# Patient Record
Sex: FEMALE | Race: ASIAN | NOT HISPANIC OR LATINO | Employment: PART TIME | URBAN - METROPOLITAN AREA
[De-identification: names, ages, dates, MRNs, and addresses within clinical notes are randomized per-mention and may not be internally consistent; named-entity substitution may affect disease eponyms.]

---
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
## 2021-01-23 ENCOUNTER — IMMUNIZATIONS (OUTPATIENT)
Dept: FAMILY MEDICINE CLINIC | Facility: HOSPITAL | Age: 21
End: 2021-01-23

## 2021-01-23 DIAGNOSIS — Z23 ENCOUNTER FOR IMMUNIZATION: Primary | ICD-10-CM

## 2021-01-23 PROCEDURE — 91301 SARS-COV-2 / COVID-19 MRNA VACCINE (MODERNA) 100 MCG: CPT

## 2021-01-23 PROCEDURE — 0012A SARS-COV-2 / COVID-19 MRNA VACCINE (MODERNA) 100 MCG: CPT

## 2021-02-11 ENCOUNTER — TELEPHONE (OUTPATIENT)
Dept: INTERNAL MEDICINE CLINIC | Facility: CLINIC | Age: 21
End: 2021-02-11

## 2021-02-11 ENCOUNTER — TELEMEDICINE (OUTPATIENT)
Dept: INTERNAL MEDICINE CLINIC | Facility: CLINIC | Age: 21
End: 2021-02-11
Payer: COMMERCIAL

## 2021-02-11 VITALS — WEIGHT: 130 LBS | HEIGHT: 61 IN | TEMPERATURE: 97 F | BODY MASS INDEX: 24.55 KG/M2

## 2021-02-11 DIAGNOSIS — F32.A ANXIETY AND DEPRESSION: Primary | ICD-10-CM

## 2021-02-11 DIAGNOSIS — N94.3 PMS (PREMENSTRUAL SYNDROME): ICD-10-CM

## 2021-02-11 DIAGNOSIS — F41.9 ANXIETY AND DEPRESSION: Primary | ICD-10-CM

## 2021-02-11 PROCEDURE — 99213 OFFICE O/P EST LOW 20 MIN: CPT | Performed by: NURSE PRACTITIONER

## 2021-02-11 RX ORDER — VENLAFAXINE HYDROCHLORIDE 37.5 MG/1
37.5 CAPSULE, EXTENDED RELEASE ORAL
Qty: 30 CAPSULE | Refills: 5 | Status: SHIPPED | OUTPATIENT
Start: 2021-02-11 | End: 2021-03-03 | Stop reason: SDUPTHER

## 2021-02-11 NOTE — TELEPHONE ENCOUNTER
Pt was not available until after 4pm today so I scheduled her for a virtual visit with Niki coronado

## 2021-02-11 NOTE — TELEPHONE ENCOUNTER
Pt would like to speak to you about getting b/w done before she gets her prescription and her mother has a few question  Please, call pt

## 2021-02-11 NOTE — TELEPHONE ENCOUNTER
Pt's mother called to ask if you could order any hormonal tests because the pt is having a lot of emotion issues during her menses  Mother reports that she perez at the drop of a hat and has anger issues  This had been going on for months  Nothing seems to help  Pt sees Dr Patricia Rodríguez for gyn  They would not do anything and cannot see her until the end of March  Please, advise

## 2021-02-12 ENCOUNTER — LAB (OUTPATIENT)
Dept: LAB | Age: 21
End: 2021-02-12
Payer: COMMERCIAL

## 2021-02-12 DIAGNOSIS — F41.9 ANXIETY AND DEPRESSION: ICD-10-CM

## 2021-02-12 DIAGNOSIS — F32.A ANXIETY AND DEPRESSION: ICD-10-CM

## 2021-02-12 DIAGNOSIS — N94.3 PMS (PREMENSTRUAL SYNDROME): ICD-10-CM

## 2021-02-12 LAB
ALBUMIN SERPL BCP-MCNC: 3.8 G/DL (ref 3.5–5)
ALP SERPL-CCNC: 43 U/L (ref 46–384)
ALT SERPL W P-5'-P-CCNC: 26 U/L (ref 12–78)
ANION GAP SERPL CALCULATED.3IONS-SCNC: 3 MMOL/L (ref 4–13)
AST SERPL W P-5'-P-CCNC: 21 U/L (ref 5–45)
BASOPHILS # BLD AUTO: 0.02 THOUSANDS/ΜL (ref 0–0.1)
BASOPHILS NFR BLD AUTO: 0 % (ref 0–1)
BILIRUB SERPL-MCNC: 0.31 MG/DL (ref 0.2–1)
BUN SERPL-MCNC: 13 MG/DL (ref 5–25)
CALCIUM SERPL-MCNC: 10 MG/DL (ref 8.3–10.1)
CHLORIDE SERPL-SCNC: 106 MMOL/L (ref 100–108)
CO2 SERPL-SCNC: 29 MMOL/L (ref 21–32)
CREAT SERPL-MCNC: 0.81 MG/DL (ref 0.6–1.3)
EOSINOPHIL # BLD AUTO: 0.03 THOUSAND/ΜL (ref 0–0.61)
EOSINOPHIL NFR BLD AUTO: 1 % (ref 0–6)
ERYTHROCYTE [DISTWIDTH] IN BLOOD BY AUTOMATED COUNT: 12.6 % (ref 11.6–15.1)
GFR SERPL CREATININE-BSD FRML MDRD: 106 ML/MIN/1.73SQ M
GLUCOSE SERPL-MCNC: 128 MG/DL (ref 65–140)
HCT VFR BLD AUTO: 41.1 % (ref 34.8–46.1)
HGB BLD-MCNC: 13.3 G/DL (ref 11.5–15.4)
IMM GRANULOCYTES # BLD AUTO: 0.02 THOUSAND/UL (ref 0–0.2)
IMM GRANULOCYTES NFR BLD AUTO: 0 % (ref 0–2)
LYMPHOCYTES # BLD AUTO: 1.81 THOUSANDS/ΜL (ref 0.6–4.47)
LYMPHOCYTES NFR BLD AUTO: 29 % (ref 14–44)
MCH RBC QN AUTO: 30.7 PG (ref 26.8–34.3)
MCHC RBC AUTO-ENTMCNC: 32.4 G/DL (ref 31.4–37.4)
MCV RBC AUTO: 95 FL (ref 82–98)
MONOCYTES # BLD AUTO: 0.33 THOUSAND/ΜL (ref 0.17–1.22)
MONOCYTES NFR BLD AUTO: 5 % (ref 4–12)
NEUTROPHILS # BLD AUTO: 4 THOUSANDS/ΜL (ref 1.85–7.62)
NEUTS SEG NFR BLD AUTO: 65 % (ref 43–75)
NRBC BLD AUTO-RTO: 0 /100 WBCS
PLATELET # BLD AUTO: 248 THOUSANDS/UL (ref 149–390)
PMV BLD AUTO: 10 FL (ref 8.9–12.7)
POTASSIUM SERPL-SCNC: 4.9 MMOL/L (ref 3.5–5.3)
PROGEST SERPL-MCNC: 0.6 NG/ML
PROT SERPL-MCNC: 7.7 G/DL (ref 6.4–8.2)
RBC # BLD AUTO: 4.33 MILLION/UL (ref 3.81–5.12)
SODIUM SERPL-SCNC: 138 MMOL/L (ref 136–145)
TSH SERPL DL<=0.05 MIU/L-ACNC: 2.52 UIU/ML (ref 0.46–3.98)
WBC # BLD AUTO: 6.21 THOUSAND/UL (ref 4.31–10.16)

## 2021-02-12 PROCEDURE — 84144 ASSAY OF PROGESTERONE: CPT

## 2021-02-12 PROCEDURE — 36415 COLL VENOUS BLD VENIPUNCTURE: CPT

## 2021-02-12 PROCEDURE — 80053 COMPREHEN METABOLIC PANEL: CPT

## 2021-02-12 PROCEDURE — 85025 COMPLETE CBC W/AUTO DIFF WBC: CPT

## 2021-02-12 PROCEDURE — 82672 ASSAY OF ESTROGEN: CPT

## 2021-02-12 PROCEDURE — 84443 ASSAY THYROID STIM HORMONE: CPT

## 2021-02-17 NOTE — PROGRESS NOTES
Virtual Brief Visit    Assessment/Plan:    Problem List Items Addressed This Visit        Other    Anxiety and depression - Primary     Spoke to patient and mother, recommend starting low dose effexor  bloodwork ordered for a baseline check up         Relevant Medications    venlafaxine (EFFEXOR-XR) 37 5 mg 24 hr capsule                Reason for visit is   Chief Complaint   Patient presents with    Virtual Brief Visit        Encounter provider KETAN Bloom    Provider located at [address] [place] [id number]    Recent Visits  No visits were found meeting these conditions  Showing recent visits within past 7 days and meeting all other requirements     Future Appointments  No visits were found meeting these conditions  Showing future appointments within next 150 days and meeting all other requirements        After connecting through [employer] and patient was informed that this is a secure, HIPAA-compliant platform  She agrees to proceed  , the patient was identified by name and date of birth  Ria Wolff was informed that this is a telemedicine visit and that the visit is being conducted through [facility] and patient was informed that this is a secure, HIPAA-compliant platform  She agrees to proceed     My office door was closed  No one else was in the room  She acknowledged consent and understanding of privacy and security of the platform  The patient has agreed to participate and understands she can discontinue the visit at any time  Patient is aware this is a billable service  Subjective    Ria Wolff is a 24 y o  female     Patient is here for depression, agitation 2 weeks before and a week after her period  Sees her gyn in march  No suicidal thoughts       Past Medical History:   Diagnosis Date    Asthma exacerbation, mild     last assessed - 06Oct2016    Chronic tonsillitis     Eczema     Fracture of phalanx of right index finger     Menorrhagia     last assessed - [id number]    Obstructive sleep apnea of child     Orthostatic dizziness     last assessed - [id number]    Premenarchal     Seasonal allergies     Resolved - [id number]       Past Surgical History:   Procedure Laterality Date    TONSILLECTOMY AND ADENOIDECTOMY         Current Outpatient Medications   Medication Sig Dispense Refill    norgestimate-ethinyl estradiol (ORTHO TRI-CYCLEN LO) 0 18/0 215/0 25 MG-25 MCG per tablet Take 1 tablet by mouth daily 84 tablet 3    Pediatric Multivit-Minerals-C (MULTIVITAMIN GUMMIES CHILDRENS) CHEW Chew 1 tablet daily      ketoconazole (NIZORAL) 2 % shampoo Apply 1 application topically once for 1 dose Apply daily x 3 days  Lather then rinse after 5 min 120 mL 2    levalbuterol (XOPENEX HFA) 45 mcg/act inhaler Inhale 1-2 puffs every 4 (four) hours as needed for wheezing (Patient not taking: Reported on 11/18/2020) 1 Inhaler 0    venlafaxine (EFFEXOR-XR) 37 5 mg 24 hr capsule Take 1 capsule (37 5 mg total) by mouth daily with breakfast 30 capsule 5     Current Facility-Administered Medications   Medication Dose Route Frequency Provider Last Rate Last Admin    levalbuterol (XOPENEX) inhalation solution 0 63 mg  0 63 mg Nebulization Q8H PRN Mehrdad Clement PA-C   0 63 mg at 02/08/19 1805        Allergies   Allergen Reactions    Other      Other reaction(s): Asthma    Pollen Extract Allergic Rhinitis and Wheezing       Review of Systems   Constitutional: Negative  HENT: Negative  Eyes: Negative  Respiratory: Negative  Cardiovascular: Negative  Gastrointestinal: Negative  Musculoskeletal: Negative  Neurological: Negative  Psychiatric/Behavioral: The patient is nervous/anxious          Vitals:    02/11/21 1534   Temp: (!) 97 °F (36 1 °C)   Weight: 59 kg (130 lb)   Height: 5' 1" (1 549 m)         I spent 15 minutes directly with the patient during this visit    VIRTUAL VISIT Shy Cunningham acknowledges that she has consented to an online visit or consultation  She understands that the online visit is based solely on information provided by her, and that, in the absence of a face-to-face physical evaluation by the physician, the diagnosis she receives is both limited and provisional in terms of accuracy and completeness  This is not intended to replace a full medical face-to-face evaluation by the physician  Ria Wolff understands and accepts these terms

## 2021-02-18 LAB — ESTROGEN SERPL-MCNC: 16 PG/ML

## 2021-02-22 PROBLEM — F41.9 ANXIETY AND DEPRESSION: Status: ACTIVE | Noted: 2021-02-22

## 2021-02-22 PROBLEM — F32.A ANXIETY AND DEPRESSION: Status: ACTIVE | Noted: 2021-02-22

## 2021-02-22 NOTE — ASSESSMENT & PLAN NOTE
Spoke to patient and mother, recommend starting low dose effexor  bloodwork ordered for a baseline check up

## 2021-03-03 ENCOUNTER — TELEMEDICINE (OUTPATIENT)
Dept: INTERNAL MEDICINE CLINIC | Facility: CLINIC | Age: 21
End: 2021-03-03
Payer: COMMERCIAL

## 2021-03-03 VITALS — TEMPERATURE: 97.7 F | HEIGHT: 61 IN | BODY MASS INDEX: 24.55 KG/M2 | WEIGHT: 130 LBS

## 2021-03-03 DIAGNOSIS — F41.9 ANXIETY AND DEPRESSION: ICD-10-CM

## 2021-03-03 DIAGNOSIS — F32.A ANXIETY AND DEPRESSION: ICD-10-CM

## 2021-03-03 PROCEDURE — 99442 PR PHYS/QHP TELEPHONE EVALUATION 11-20 MIN: CPT | Performed by: NURSE PRACTITIONER

## 2021-03-03 RX ORDER — BIOTIN 1 MG
3000 TABLET ORAL DAILY
COMMUNITY
End: 2022-03-30

## 2021-03-03 RX ORDER — VENLAFAXINE HYDROCHLORIDE 75 MG/1
75 CAPSULE, EXTENDED RELEASE ORAL
Qty: 30 CAPSULE | Refills: 2 | Status: SHIPPED | OUTPATIENT
Start: 2021-03-03 | End: 2021-06-21 | Stop reason: SDUPTHER

## 2021-03-05 NOTE — PROGRESS NOTES
Virtual Brief Visit    Assessment/Plan:    Problem List Items Addressed This Visit        Other    Anxiety and depression     Increase effexor to 75mg  Follow up in 2 weeks         Relevant Medications    venlafaxine (EFFEXOR-XR) 75 mg 24 hr capsule                Reason for visit is   Chief Complaint   Patient presents with    Virtual Brief Visit        Encounter provider KETAN Corral    Provider located at [address] [address] [phone]    Recent Visits  Date Type Provider Dept   03/03/[facility] recent visits within past 7 days and meeting all other requirements     Future Appointments  No visits were found meeting these conditions  Showing future appointments within next 150 days and meeting all other requirements        After connecting through telephone, the patient was identified by name and date of birth  Sherren Ro was informed that this is a telemedicine visit and that the visit is being conducted through telephone  My office door was closed  No one else was in the room  She acknowledged consent and understanding of privacy and security of the platform  The patient has agreed to participate and understands she can discontinue the visit at any time  Patient is aware this is a billable service  Subjective    Sherren Ro is a 24 y o  female     Patient is here to follow up on depression and anxiety   On effexor 37 5mg and tolerating the medication  However she is seeing no improvement in her moods       Past Medical History:   Diagnosis Date    Asthma exacerbation, mild     last assessed - 06Oct2016    Chronic tonsillitis     Eczema     Fracture of phalanx of right index finger     Menorrhagia     last assessed - [id number]    Obstructive sleep apnea of child     Orthostatic dizziness     last assessed - [id number]    Premenarchal     Seasonal allergies [facility] - [id number]       Past Surgical History:   Procedure Laterality Date    TONSILLECTOMY AND ADENOIDECTOMY         Current Outpatient Medications   Medication Sig Dispense Refill    Biotin 1000 MCG tablet Take 3,000 mcg by mouth daily      norgestimate-ethinyl estradiol (ORTHO TRI-CYCLEN LO) 0 18/0 215/0 25 MG-25 MCG per tablet Take 1 tablet by mouth daily 84 tablet 3    Pediatric Multivit-Minerals-C (MULTIVITAMIN GUMMIES CHILDRENS) CHEW Chew 1 tablet daily      venlafaxine (EFFEXOR-XR) 75 mg 24 hr capsule Take 1 capsule (75 mg total) by mouth daily with breakfast 30 capsule 2    ketoconazole (NIZORAL) 2 % shampoo Apply 1 application topically once for 1 dose Apply daily x 3 days  Lather then rinse after 5 min (Patient not taking: Reported on 3/3/2021) 120 mL 2    levalbuterol (XOPENEX HFA) 45 mcg/act inhaler Inhale 1-2 puffs every 4 (four) hours as needed for wheezing (Patient not taking: Reported on 3/3/2021) 1 Inhaler 0     Current Facility-Administered Medications   Medication Dose Route Frequency Provider Last Rate Last Admin    levalbuterol (XOPENEX) inhalation solution 0 63 mg  0 63 mg Nebulization Q8H PRN Mehrdad Clement PA-C   0 63 mg at 02/08/19 1805        Allergies   Allergen Reactions    Other      Other reaction(s): Asthma    Pollen Extract Allergic Rhinitis and Wheezing       Review of Systems   Constitutional: Negative  HENT: Negative  Eyes: Negative  Respiratory: Negative  Cardiovascular: Negative  Gastrointestinal: Negative  Musculoskeletal: Negative  Neurological: Negative  Psychiatric/Behavioral: Positive for agitation  The patient is nervous/anxious  Vitals:    03/03/21 1343   Temp: 97 7 °F (36 5 °C)   Weight: 59 kg (130 lb)   Height: 5' 1" (1 549 m)         I spent 15 minutes directly with the patient during this visit    VIRTUAL VISIT DISCLAIMER    Per [place] acknowledges that she has consented to an online visit or consultation   She understands that the online visit is based solely on information provided by her, and that, in the absence of a face-to-face physical evaluation by the physician, the diagnosis she receives is both limited and provisional in terms of accuracy and completeness  This is not intended to replace a full medical face-to-face evaluation by the physician  Kaylee Claudio understands and accepts these terms

## 2021-03-17 ENCOUNTER — TELEMEDICINE (OUTPATIENT)
Dept: INTERNAL MEDICINE CLINIC | Facility: CLINIC | Age: 21
End: 2021-03-17
Payer: COMMERCIAL

## 2021-03-17 DIAGNOSIS — F41.9 ANXIETY AND DEPRESSION: Primary | ICD-10-CM

## 2021-03-17 DIAGNOSIS — F32.A ANXIETY AND DEPRESSION: Primary | ICD-10-CM

## 2021-03-17 PROCEDURE — 99213 OFFICE O/P EST LOW 20 MIN: CPT | Performed by: NURSE PRACTITIONER

## 2021-03-17 NOTE — PROGRESS NOTES
Virtual Brief Visit    Assessment/Plan:    Problem List Items Addressed This Visit        Other    Anxiety and depression - Primary     Increased effexor to 75mh  Follow up in 3 weeks  See gyn in a week                     Reason for visit is   Chief Complaint   Patient presents with    Virtual Brief Visit        Encounter provider KETAN Vickers    Provider located at [address] [place] [id number]    Recent Visits  Date Type Provider Dept   03/17/21 Telemedicine Carol Vickers recent visits within past 7 days and meeting all other requirements     Future Appointments  No visits were found meeting these conditions  Showing future appointments within next 150 days and meeting all other requirements        After connecting through [employer] and patient was informed that this is a secure, HIPAA-compliant platform  She agrees to proceed  , the patient was identified by name and date of birth  Helen Ayala was informed that this is a telemedicine visit and that the visit is being conducted through [facility] and patient was informed that this is a secure, HIPAA-compliant platform  She agrees to proceed     My office door was closed  No one else was in the room  She acknowledged consent and understanding of privacy and security of the platform  The patient has agreed to participate and understands she can discontinue the visit at any time  Patient is aware this is a billable service  Subjective    Helen Ayala is a [address] [facility] y o  female     Patient is here to follow up of depression/anxiety  She feels an improvement on effexor  Not as depressed/irritable around her period       Past Medical History:   Diagnosis Date    Asthma exacerbation, mild     last assessed - 06Oct2016    Chronic tonsillitis     Eczema     Fracture of phalanx of right index finger     Menorrhagia     last assessed - [id number]    Obstructive sleep apnea of child     Orthostatic dizziness     last assessed - 20Nov2015    Premenarchal     Seasonal allergies     Resolved - [id number]       Past Surgical History:   Procedure Laterality Date    TONSILLECTOMY AND ADENOIDECTOMY         Current Outpatient Medications   Medication Sig Dispense Refill    Biotin 1000 MCG tablet Take 3,000 mcg by mouth daily      norgestimate-ethinyl estradiol (ORTHO TRI-CYCLEN LO) 0 18/0 215/0 25 MG-25 MCG per tablet Take 1 tablet by mouth daily 84 tablet 3    Pediatric Multivit-Minerals-C (MULTIVITAMIN GUMMIES CHILDRENS) CHEW Chew 1 tablet daily      venlafaxine (EFFEXOR-XR) 75 mg 24 hr capsule Take 1 capsule (75 mg total) by mouth daily with breakfast 30 capsule 2    ketoconazole (NIZORAL) 2 % shampoo Apply 1 application topically once for 1 dose Apply daily x 3 days  Lather then rinse after 5 min (Patient not taking: Reported on 3/3/2021) 120 mL 2     Current Facility-Administered Medications   Medication Dose Route Frequency Provider Last Rate Last Admin    levalbuterol (XOPENEX) inhalation solution 0 63 mg  0 63 mg Nebulization Q8H PRN Mehrdad Clement PA-C   0 63 mg at 02/08/19 1805        Allergies   Allergen Reactions    Other      Other reaction(s): Asthma    Pollen Extract Allergic Rhinitis and Wheezing       Review of Systems   Constitutional: Negative  HENT: Negative  Eyes: Negative  Respiratory: Negative  Cardiovascular: Negative  Gastrointestinal: Negative  Musculoskeletal: Negative  Neurological: Negative  There were no vitals filed for this visit  I spent 15 minutes directly with the patient during this visit    [address] acknowledges that she has consented to an online visit or consultation   She understands that the online visit is based solely on information provided by her, and that, in the absence of a face-to-face physical evaluation by the physician, the diagnosis she receives is both limited and provisional in terms of accuracy and completeness  This is not intended to replace a full medical face-to-face evaluation by the physician  Bianca Bolton understands and accepts these terms

## 2021-03-24 ENCOUNTER — OFFICE VISIT (OUTPATIENT)
Dept: OBGYN CLINIC | Facility: CLINIC | Age: 21
End: 2021-03-24
Payer: COMMERCIAL

## 2021-03-24 VITALS
HEIGHT: 61 IN | SYSTOLIC BLOOD PRESSURE: 100 MMHG | WEIGHT: 131 LBS | BODY MASS INDEX: 24.73 KG/M2 | DIASTOLIC BLOOD PRESSURE: 68 MMHG

## 2021-03-24 DIAGNOSIS — F32.A ANXIETY AND DEPRESSION: Primary | ICD-10-CM

## 2021-03-24 DIAGNOSIS — R53.83 FATIGUE, UNSPECIFIED TYPE: ICD-10-CM

## 2021-03-24 DIAGNOSIS — F41.9 ANXIETY AND DEPRESSION: Primary | ICD-10-CM

## 2021-03-24 PROCEDURE — 99214 OFFICE O/P EST MOD 30 MIN: CPT | Performed by: OBSTETRICS & GYNECOLOGY

## 2021-03-24 NOTE — PROGRESS NOTES
Assessment/Plan:    We discussed options including changing OCPs verses monitoring symptoms over the next month, given there has been improvement since starting antidepressant  Risks and benefits reviewed  She will continue to follow-up with her therapist   She is also seeking help, tutorial regarding her class load  She will call with update in 3 weeks  Return to office 11/2021 for annual or p r n  No problem-specific Assessment & Plan notes found for this encounter  There are no diagnoses linked to this encounter  Subjective:      Patient ID: Darwin Chen is a 24 y o  female  HPI       This is a pleasant 20-year-old female G0 who presents for discussion of anxiety, depression, mood swings irritability over the last 3 months  Patient has been on Ortho-Tri-Cyclen Lo for approximately 13 months  Her menstrual cycles are regular every 4 weeks lasting 5 days with no breakthrough bleeding  She feels her menstrual cycles have improved when she started the birth control pills  She has not been sexually active in over 1 year  She is seeing a therapist   She is also discussed with her primary care provider anxiety  She was placed on Effexor approximately 4 weeks ago, slightly titrated up  She has noticed some improvement  She also has found school more difficult, second year [employer]  She is not doing well in 2 of her classes  It is a hybrid model  She feels her mood swings irritability are on a daily basis throughout the month, not necessarily worse with menses  The following portions of the patient's history were reviewed and updated as appropriate: allergies, current medications, past family history, past medical history, past social history, past surgical history and problem list     Review of Systems   Constitutional: Positive for fatigue  Negative for fever and unexpected weight change  Respiratory: Negative for cough, chest tightness, shortness of breath and wheezing  Cardiovascular: Negative  Negative for chest pain and palpitations  Gastrointestinal: Negative  Negative for abdominal distention, abdominal pain, blood in stool, constipation, diarrhea, nausea and vomiting  Genitourinary: Negative  Negative for difficulty urinating, dyspareunia, dysuria, flank pain, frequency, genital sores, hematuria, pelvic pain, urgency, vaginal bleeding, vaginal discharge and vaginal pain  Skin: Negative for rash  Objective:      /68   Ht 5' 1" (1 549 m)   Wt 59 4 kg (131 lb)   LMP 03/15/2021   BMI 24 75 kg/m²          Physical Exam  Constitutional:       Appearance: Normal appearance  Cardiovascular:      Rate and Rhythm: Normal rate and regular rhythm  Pulmonary:      Effort: Pulmonary effort is normal    Neurological:      Mental Status: She is alert and oriented to person, place, and time     Psychiatric:         Behavior: Behavior normal

## 2021-06-21 DIAGNOSIS — F41.9 ANXIETY AND DEPRESSION: ICD-10-CM

## 2021-06-21 DIAGNOSIS — F32.A ANXIETY AND DEPRESSION: ICD-10-CM

## 2021-06-21 RX ORDER — VENLAFAXINE HYDROCHLORIDE 75 MG/1
75 CAPSULE, EXTENDED RELEASE ORAL
Qty: 30 CAPSULE | Refills: 2 | Status: SHIPPED | OUTPATIENT
Start: 2021-06-21 | End: 2021-10-02

## 2021-06-25 ENCOUNTER — OFFICE VISIT (OUTPATIENT)
Dept: INTERNAL MEDICINE CLINIC | Facility: CLINIC | Age: 21
End: 2021-06-25
Payer: COMMERCIAL

## 2021-06-25 ENCOUNTER — TELEPHONE (OUTPATIENT)
Dept: INTERNAL MEDICINE CLINIC | Facility: CLINIC | Age: 21
End: 2021-06-25

## 2021-06-25 VITALS
BODY MASS INDEX: 24.17 KG/M2 | TEMPERATURE: 96.6 F | HEIGHT: 61 IN | HEART RATE: 81 BPM | OXYGEN SATURATION: 99 % | WEIGHT: 128 LBS | DIASTOLIC BLOOD PRESSURE: 80 MMHG | SYSTOLIC BLOOD PRESSURE: 100 MMHG

## 2021-06-25 DIAGNOSIS — K62.5 RECTAL BLEEDING: Primary | ICD-10-CM

## 2021-06-25 PROBLEM — R10.32 LEFT LOWER QUADRANT ABDOMINAL PAIN: Status: ACTIVE | Noted: 2021-06-25

## 2021-06-25 PROCEDURE — 99213 OFFICE O/P EST LOW 20 MIN: CPT | Performed by: NURSE PRACTITIONER

## 2021-06-25 NOTE — TELEPHONE ENCOUNTER
It would be up to the specialist if they want to do a colonoscopy  She needs to see the doctor first and be evaluated and they will decide on a plan

## 2021-06-25 NOTE — TELEPHONE ENCOUNTER
Patient is asking if you can call her mom and give her an update on what is going on  Patient's mom would like to speak with you  Patient will be going to Colorectal today at 12:45 for an appointment  Patient is also requesting a note for work for today        Please advise

## 2021-06-25 NOTE — PATIENT INSTRUCTIONS
Rectal Bleeding   WHAT YOU NEED TO KNOW:   What can cause rectal bleeding? · Constipation    · Hemorrhoids (swollen blood vessels in your rectum)    · Anal fissures (tears in the tissue inside your anus)    · Medical conditions, such as cancer, colitis, or diverticulitis     · Growths, such as tumors or polyps    · Medical treatments, such as radiation or rectal surgery    What increases my risk for rectal bleeding? · Older age    · Certain medicines, such as blood thinners and NSAIDs    · Medical conditions, such as inflammatory bowel disease, liver disease, or HIV    What other signs and symptoms may happen with rectal bleeding? You may have pain in your rectum or anus  You may also have abdominal pain or cramping  How is the cause of rectal bleeding diagnosed? · Rectal exam:  Your healthcare provider may gently insert a gloved finger into your anus  He will collect a bowel movement sample and send it to a lab for tests  · Blood tests: You may need blood taken to check for anemia (low amount of red blood cells)  · CT scan: This test is also called a CAT scan  An x-ray machine uses a computer to take pictures of the organs and blood vessels in your abdomen  The pictures may show problems that could cause bleeding  You may be given a dye before the pictures are taken to help healthcare providers see the pictures better  Tell the healthcare provider if you have ever had an allergic reaction to contrast dye  · Colonoscopy: This is a procedure to look inside your lower bowel  It may show where the bleeding is coming from and what is causing it  A tube with a light on the end will be put into your anus and then moved into your colon  If your healthcare provider finds a growth, he may remove it  · Endoscopy: This is a procedure to look inside your upper bowel  It may show where the bleeding is coming from and what is causing it   A tube with a light on the end is inserted into your throat and moved down into your stomach and upper bowel  If your healthcare provider finds a growth, he may remove it  He may put a shot of medicine in bleeding areas to narrow the blood vessels and stop the bleeding  Heat, laser, or electric currents may also be used to make the blood clot  How is rectal bleeding treated? · Medicine:      ? Pain medicine: You may be given a prescription medicine to decrease pain  Do not wait until the pain is severe before you take this medicine  ? Vasoconstrictors: This medicine decreases the size of your blood vessels and may help stop the bleeding  ? Iron supplement:  Iron helps your body make more red blood cells  ? Steroids: This medicine decreases inflammation in your rectum  It may be applied as a cream, ointment, or lotion  · IV:  You may need an IV if you are dehydrated and need extra liquids  · Blood transfusion:  You will get whole or parts of blood through an IV during a transfusion  Blood is tested for diseases, such as hepatitis and HIV, to be sure it is safe  · Surgery: You may need surgery to remove hemorrhoids, tumors, or polyps  What are the risks of rectal bleeding? · You may have abdominal pain or damage to nearby organs and blood vessels with surgery  Even with treatment, rectal bleeding may continue  Or, it may go away for a time and start again  · Without treatment, you may continue to have pain and cramping  You may develop anemia  You may need a blood transfusion  You may lose a large amount of blood  This can be life-threatening  How can I manage my symptoms? Ask your healthcare provider how much liquid to drink each day and which liquids are best for you  This will help prevent dehydration and constipation  When should I contact my healthcare provider? · You have a fever  · Your rectal bleeding stopped for a time, but has started again  · You have nausea  · You have cold, sweaty, pale skin      · You have changes in your bowel movements, such as diarrhea  · You have questions or concerns about your condition or care  When should I seek immediate care or call 911? · You are breathing faster than usual     · You are dizzy, lightheaded, or feel faint  · You are confused or cannot think clearly  · You urinate less than usual or not at all  · Your rectal bleeding is constant or heavy  · You have severe abdominal pain or cramping  CARE AGREEMENT:   You have the right to help plan your care  Learn about your health condition and how it may be treated  Discuss treatment options with your healthcare providers to decide what care you want to receive  You always have the right to refuse treatment  The above information is an  only  It is not intended as medical advice for individual conditions or treatments  Talk to your doctor, nurse or pharmacist before following any medical regimen to see if it is safe and effective for you  © Copyright [address] Information is for End User's use only and may not be sold, redistributed or otherwise used for commercial purposes   All illustrations and images included in CareNotes® are the copyrighted property of [employer]  or [address] [place] [address]

## 2021-06-25 NOTE — ASSESSMENT & PLAN NOTE
Patient has appointment with dr Faby Phillips colorectal today  Eat bland diet cut out spicy foods, coffee, tomatoes

## 2021-06-25 NOTE — PROGRESS NOTES
Assessment/Plan:    Rectal bleeding  Patient has appointment with dr Natalia Parrish colorectal today  Eat bland diet cut out spicy foods, coffee, tomatoes        Diagnoses and all orders for this visit:    Rectal bleeding  -     Ambulatory referral to Colorectal Surgery; Future          Subjective:      Patient ID: Darren Maza is a 22 y o  female  Patient is here for rectal bleeding since Tuesday   Continues to bleed, she has to wear a pad  Blood is darker red  No rectal pain, hemorrhoids  She has chronic constipation        The following portions of the patient's history were reviewed and updated as appropriate: allergies, current medications, past family history, past medical history, past social history, past surgical history and problem list     Review of Systems   Constitutional: Negative  HENT: Negative  Eyes: Negative  Respiratory: Negative  Cardiovascular: Negative  Gastrointestinal: Positive for rectal pain  Musculoskeletal: Negative  Neurological: Negative  Objective:      /80   Pulse 81   Temp (!) 96 6 °F (35 9 °C) (Temporal)   Ht 5' 1" (1 549 m)   Wt 58 1 kg (128 lb)   SpO2 99%   BMI 24 19 kg/m²          Physical Exam  Vitals and nursing note reviewed  Constitutional:       Appearance: Normal appearance  She is well-developed  HENT:      Head: Normocephalic and atraumatic  Right Ear: External ear normal       Left Ear: External ear normal       Nose: Nose normal    Eyes:      Conjunctiva/sclera: Conjunctivae normal       Pupils: Pupils are equal, round, and reactive to light  Cardiovascular:      Rate and Rhythm: Normal rate and regular rhythm  Pulmonary:      Effort: Pulmonary effort is normal       Breath sounds: Normal breath sounds  Abdominal:      General: Bowel sounds are normal       Palpations: Abdomen is soft  Musculoskeletal:         General: Normal range of motion  Cervical back: Normal range of motion and neck supple     Skin: General: Skin is warm and dry  Neurological:      Mental Status: She is alert and oriented to person, place, and time

## 2021-06-25 NOTE — TELEPHONE ENCOUNTER
Mom called to confirm if her daughter will be scoped? The office you referred her to is saying is not not being scoped  Please advise

## 2021-06-25 NOTE — LETTER
June 25, 2021     Patient: Phoebe Constantino   YOB: 2000   Date of Visit: 6/25/2021       To Whom it May Concern:    [place] Sol[place] is under my professional care  She was seen in my office on 6/25/2021  Please excuse her from work today         Sincerely,          KETAN Bearden        CC: No Recipients

## 2021-06-30 ENCOUNTER — TELEPHONE (OUTPATIENT)
Dept: OBGYN CLINIC | Facility: CLINIC | Age: 21
End: 2021-06-30

## 2021-06-30 NOTE — TELEPHONE ENCOUNTER
Patient is calling cause is having break through bleeding for the first time on her pill (Ortho Tri Cyclen Lo)  Is having brown bleeding that started a week ago, period not due til next Tuesday  Is having to change full panty liner every 4 hours  Has not missed any pills  Has no change in diet, stress or life changes

## 2021-06-30 NOTE — TELEPHONE ENCOUNTER
Please explain to patient, given first episode of breakthrough bleeding, would recommend monitoring over the next month  If she has 2 consecutive months of breakthrough bleeding, then I would recommend changing birth control pill brands

## 2021-10-01 DIAGNOSIS — F41.9 ANXIETY AND DEPRESSION: ICD-10-CM

## 2021-10-01 DIAGNOSIS — F32.A ANXIETY AND DEPRESSION: ICD-10-CM

## 2021-10-02 RX ORDER — VENLAFAXINE HYDROCHLORIDE 75 MG/1
CAPSULE, EXTENDED RELEASE ORAL
Qty: 30 CAPSULE | Refills: 0 | Status: SHIPPED | OUTPATIENT
Start: 2021-10-02 | End: 2021-11-02 | Stop reason: SDUPTHER

## 2021-10-09 ENCOUNTER — OFFICE VISIT (OUTPATIENT)
Dept: URGENT CARE | Age: 21
End: 2021-10-09
Payer: COMMERCIAL

## 2021-10-09 ENCOUNTER — NURSE TRIAGE (OUTPATIENT)
Dept: OTHER | Facility: OTHER | Age: 21
End: 2021-10-09

## 2021-10-09 VITALS — TEMPERATURE: 97.7 F | OXYGEN SATURATION: 98 % | RESPIRATION RATE: 20 BRPM | HEART RATE: 118 BPM

## 2021-10-09 DIAGNOSIS — H66.91 RIGHT OTITIS MEDIA, UNSPECIFIED OTITIS MEDIA TYPE: ICD-10-CM

## 2021-10-09 DIAGNOSIS — J02.9 SORE THROAT: Primary | ICD-10-CM

## 2021-10-09 LAB — S PYO AG THROAT QL: NEGATIVE

## 2021-10-09 PROCEDURE — 87070 CULTURE OTHR SPECIMN AEROBIC: CPT | Performed by: NURSE PRACTITIONER

## 2021-10-09 PROCEDURE — U0003 INFECTIOUS AGENT DETECTION BY NUCLEIC ACID (DNA OR RNA); SEVERE ACUTE RESPIRATORY SYNDROME CORONAVIRUS 2 (SARS-COV-2) (CORONAVIRUS DISEASE [COVID-19]), AMPLIFIED PROBE TECHNIQUE, MAKING USE OF HIGH THROUGHPUT TECHNOLOGIES AS DESCRIBED BY CMS-2020-01-R: HCPCS | Performed by: NURSE PRACTITIONER

## 2021-10-09 PROCEDURE — 87880 STREP A ASSAY W/OPTIC: CPT | Performed by: NURSE PRACTITIONER

## 2021-10-09 PROCEDURE — U0005 INFEC AGEN DETEC AMPLI PROBE: HCPCS | Performed by: NURSE PRACTITIONER

## 2021-10-09 PROCEDURE — G0382 LEV 3 HOSP TYPE B ED VISIT: HCPCS | Performed by: NURSE PRACTITIONER

## 2021-10-09 RX ORDER — AMOXICILLIN 875 MG/1
875 TABLET, COATED ORAL 2 TIMES DAILY
Qty: 14 TABLET | Refills: 0 | Status: SHIPPED | OUTPATIENT
Start: 2021-10-09 | End: 2021-10-16

## 2021-10-10 LAB — SARS-COV-2 RNA RESP QL NAA+PROBE: NEGATIVE

## 2021-10-11 LAB — BACTERIA THROAT CULT: NORMAL

## 2021-10-14 DIAGNOSIS — J45.901 MILD ASTHMA WITH ACUTE EXACERBATION, UNSPECIFIED WHETHER PERSISTENT: ICD-10-CM

## 2021-10-14 RX ORDER — LEVALBUTEROL TARTRATE 45 UG/1
1-2 AEROSOL, METERED ORAL EVERY 4 HOURS PRN
Qty: 15 G | Refills: 1 | Status: SHIPPED | OUTPATIENT
Start: 2021-10-14

## 2021-10-14 RX ORDER — LEVALBUTEROL INHALATION SOLUTION 0.63 MG/3ML
0.63 SOLUTION RESPIRATORY (INHALATION) EVERY 8 HOURS PRN
Qty: 3 ML | Refills: 1 | Status: SHIPPED | OUTPATIENT
Start: 2021-10-14

## 2021-11-17 ENCOUNTER — APPOINTMENT (OUTPATIENT)
Dept: URGENT CARE | Age: 21
End: 2021-11-17

## 2021-11-22 ENCOUNTER — OFFICE VISIT (OUTPATIENT)
Dept: FAMILY MEDICINE CLINIC | Facility: CLINIC | Age: 21
End: 2021-11-22
Payer: COMMERCIAL

## 2021-11-22 VITALS
HEART RATE: 84 BPM | BODY MASS INDEX: 24.47 KG/M2 | OXYGEN SATURATION: 96 % | DIASTOLIC BLOOD PRESSURE: 62 MMHG | HEIGHT: 61 IN | TEMPERATURE: 97.7 F | WEIGHT: 129.6 LBS | SYSTOLIC BLOOD PRESSURE: 102 MMHG

## 2021-11-22 DIAGNOSIS — L60.9 NAIL ABNORMALITIES: ICD-10-CM

## 2021-11-22 DIAGNOSIS — E53.1 PYRIDOXINE DEFICIENCY: ICD-10-CM

## 2021-11-22 DIAGNOSIS — E55.9 VITAMIN D DEFICIENCY: ICD-10-CM

## 2021-11-22 DIAGNOSIS — E51.9 THIAMINE DEFICIENCY: ICD-10-CM

## 2021-11-22 DIAGNOSIS — L65.9 THINNING HAIR: Primary | ICD-10-CM

## 2021-11-22 DIAGNOSIS — L85.8 KERATOSIS PILARIS: ICD-10-CM

## 2021-11-22 DIAGNOSIS — E50.9 VITAMIN A DEFICIENCY: ICD-10-CM

## 2021-11-22 DIAGNOSIS — E54 ASCORBIC ACID DEFICIENCY: ICD-10-CM

## 2021-11-22 DIAGNOSIS — E53.8 CYANOCOBALAMIN DEFICIENCY: ICD-10-CM

## 2021-11-22 DIAGNOSIS — E60 ZINC DEFICIENCY: ICD-10-CM

## 2021-11-22 PROCEDURE — 99215 OFFICE O/P EST HI 40 MIN: CPT | Performed by: FAMILY MEDICINE

## 2021-11-28 ENCOUNTER — APPOINTMENT (OUTPATIENT)
Dept: LAB | Age: 21
End: 2021-11-28
Payer: COMMERCIAL

## 2021-11-28 DIAGNOSIS — E53.8 CYANOCOBALAMIN DEFICIENCY: ICD-10-CM

## 2021-11-28 DIAGNOSIS — E50.9 VITAMIN A DEFICIENCY: ICD-10-CM

## 2021-11-28 DIAGNOSIS — E53.1 PYRIDOXINE DEFICIENCY: ICD-10-CM

## 2021-11-28 DIAGNOSIS — E51.9 THIAMINE DEFICIENCY: ICD-10-CM

## 2021-11-28 DIAGNOSIS — E54 ASCORBIC ACID DEFICIENCY: ICD-10-CM

## 2021-11-28 DIAGNOSIS — E60 ZINC DEFICIENCY: ICD-10-CM

## 2021-11-28 DIAGNOSIS — L65.9 THINNING HAIR: ICD-10-CM

## 2021-11-28 DIAGNOSIS — E55.9 VITAMIN D DEFICIENCY: ICD-10-CM

## 2021-11-28 DIAGNOSIS — Z30.41 ENCOUNTER FOR SURVEILLANCE OF CONTRACEPTIVE PILLS: ICD-10-CM

## 2021-11-28 LAB
25(OH)D3 SERPL-MCNC: 33.9 NG/ML (ref 30–100)
ALBUMIN SERPL BCP-MCNC: 3.7 G/DL (ref 3.5–5)
ALP SERPL-CCNC: 49 U/L (ref 46–116)
ALT SERPL W P-5'-P-CCNC: 20 U/L (ref 12–78)
ANION GAP SERPL CALCULATED.3IONS-SCNC: 6 MMOL/L (ref 4–13)
AST SERPL W P-5'-P-CCNC: 14 U/L (ref 5–45)
BACTERIA UR QL AUTO: ABNORMAL /HPF
BASOPHILS # BLD AUTO: 0.03 THOUSANDS/ΜL (ref 0–0.1)
BASOPHILS NFR BLD AUTO: 1 % (ref 0–1)
BILIRUB SERPL-MCNC: 0.33 MG/DL (ref 0.2–1)
BILIRUB UR QL STRIP: NEGATIVE
BUN SERPL-MCNC: 15 MG/DL (ref 5–25)
CALCIUM SERPL-MCNC: 9.2 MG/DL (ref 8.3–10.1)
CHLORIDE SERPL-SCNC: 103 MMOL/L (ref 100–108)
CLARITY UR: ABNORMAL
CO2 SERPL-SCNC: 28 MMOL/L (ref 21–32)
COLOR UR: YELLOW
CORTIS AM PEAK SERPL-MCNC: 28.4 UG/DL (ref 4.2–22.4)
CREAT SERPL-MCNC: 0.74 MG/DL (ref 0.6–1.3)
CRP SERPL QL: <3 MG/L
EOSINOPHIL # BLD AUTO: 0.08 THOUSAND/ΜL (ref 0–0.61)
EOSINOPHIL NFR BLD AUTO: 1 % (ref 0–6)
ERYTHROCYTE [DISTWIDTH] IN BLOOD BY AUTOMATED COUNT: 13.1 % (ref 11.6–15.1)
ERYTHROCYTE [SEDIMENTATION RATE] IN BLOOD: 16 MM/HOUR (ref 0–19)
FERRITIN SERPL-MCNC: 23 NG/ML (ref 8–388)
GFR SERPL CREATININE-BSD FRML MDRD: 117 ML/MIN/1.73SQ M
GLUCOSE P FAST SERPL-MCNC: 82 MG/DL (ref 65–99)
GLUCOSE UR STRIP-MCNC: NEGATIVE MG/DL
HCT VFR BLD AUTO: 40.6 % (ref 34.8–46.1)
HGB BLD-MCNC: 12.8 G/DL (ref 11.5–15.4)
HGB UR QL STRIP.AUTO: ABNORMAL
IMM GRANULOCYTES # BLD AUTO: 0.02 THOUSAND/UL (ref 0–0.2)
IMM GRANULOCYTES NFR BLD AUTO: 0 % (ref 0–2)
IRON SATN MFR SERPL: 22 % (ref 15–50)
IRON SERPL-MCNC: 89 UG/DL (ref 50–170)
KETONES UR STRIP-MCNC: NEGATIVE MG/DL
LEUKOCYTE ESTERASE UR QL STRIP: ABNORMAL
LYMPHOCYTES # BLD AUTO: 2.65 THOUSANDS/ΜL (ref 0.6–4.47)
LYMPHOCYTES NFR BLD AUTO: 48 % (ref 14–44)
MCH RBC QN AUTO: 30.3 PG (ref 26.8–34.3)
MCHC RBC AUTO-ENTMCNC: 31.5 G/DL (ref 31.4–37.4)
MCV RBC AUTO: 96 FL (ref 82–98)
MONOCYTES # BLD AUTO: 0.38 THOUSAND/ΜL (ref 0.17–1.22)
MONOCYTES NFR BLD AUTO: 7 % (ref 4–12)
MUCOUS THREADS UR QL AUTO: ABNORMAL
NEUTROPHILS # BLD AUTO: 2.39 THOUSANDS/ΜL (ref 1.85–7.62)
NEUTS SEG NFR BLD AUTO: 43 % (ref 43–75)
NITRITE UR QL STRIP: NEGATIVE
NON-SQ EPI CELLS URNS QL MICRO: ABNORMAL /HPF
NRBC BLD AUTO-RTO: 0 /100 WBCS
PH UR STRIP.AUTO: 7.5 [PH]
PLATELET # BLD AUTO: 245 THOUSANDS/UL (ref 149–390)
PMV BLD AUTO: 10.9 FL (ref 8.9–12.7)
POTASSIUM SERPL-SCNC: 3.8 MMOL/L (ref 3.5–5.3)
PROT SERPL-MCNC: 7.6 G/DL (ref 6.4–8.2)
PROT UR STRIP-MCNC: NEGATIVE MG/DL
RBC # BLD AUTO: 4.23 MILLION/UL (ref 3.81–5.12)
RBC #/AREA URNS AUTO: ABNORMAL /HPF
SODIUM SERPL-SCNC: 137 MMOL/L (ref 136–145)
SP GR UR STRIP.AUTO: 1.02 (ref 1–1.03)
T4 FREE SERPL-MCNC: 0.98 NG/DL (ref 0.78–1.33)
TIBC SERPL-MCNC: 407 UG/DL (ref 250–450)
TSH SERPL DL<=0.05 MIU/L-ACNC: 4.18 UIU/ML (ref 0.46–3.98)
UROBILINOGEN UR QL STRIP.AUTO: 0.2 E.U./DL
VIT B12 SERPL-MCNC: 582 PG/ML (ref 100–900)
WBC # BLD AUTO: 5.55 THOUSAND/UL (ref 4.31–10.16)
WBC #/AREA URNS AUTO: ABNORMAL /HPF

## 2021-11-28 PROCEDURE — 82607 VITAMIN B-12: CPT

## 2021-11-28 PROCEDURE — 84425 ASSAY OF VITAMIN B-1: CPT

## 2021-11-28 PROCEDURE — 86038 ANTINUCLEAR ANTIBODIES: CPT

## 2021-11-28 PROCEDURE — 83550 IRON BINDING TEST: CPT

## 2021-11-28 PROCEDURE — 86140 C-REACTIVE PROTEIN: CPT

## 2021-11-28 PROCEDURE — 80053 COMPREHEN METABOLIC PANEL: CPT

## 2021-11-28 PROCEDURE — 84403 ASSAY OF TOTAL TESTOSTERONE: CPT

## 2021-11-28 PROCEDURE — 87086 URINE CULTURE/COLONY COUNT: CPT

## 2021-11-28 PROCEDURE — 82533 TOTAL CORTISOL: CPT

## 2021-11-28 PROCEDURE — 82784 ASSAY IGA/IGD/IGG/IGM EACH: CPT

## 2021-11-28 PROCEDURE — 86255 FLUORESCENT ANTIBODY SCREEN: CPT

## 2021-11-28 PROCEDURE — 84207 ASSAY OF VITAMIN B-6: CPT

## 2021-11-28 PROCEDURE — 83516 IMMUNOASSAY NONANTIBODY: CPT

## 2021-11-28 PROCEDURE — 85652 RBC SED RATE AUTOMATED: CPT

## 2021-11-28 PROCEDURE — 84590 ASSAY OF VITAMIN A: CPT

## 2021-11-28 PROCEDURE — 84630 ASSAY OF ZINC: CPT

## 2021-11-28 PROCEDURE — 82728 ASSAY OF FERRITIN: CPT

## 2021-11-28 PROCEDURE — 81001 URINALYSIS AUTO W/SCOPE: CPT

## 2021-11-28 PROCEDURE — 84439 ASSAY OF FREE THYROXINE: CPT

## 2021-11-28 PROCEDURE — 84443 ASSAY THYROID STIM HORMONE: CPT

## 2021-11-28 PROCEDURE — 36415 COLL VENOUS BLD VENIPUNCTURE: CPT

## 2021-11-28 PROCEDURE — 83540 ASSAY OF IRON: CPT

## 2021-11-28 PROCEDURE — 85025 COMPLETE CBC W/AUTO DIFF WBC: CPT

## 2021-11-28 PROCEDURE — 82306 VITAMIN D 25 HYDROXY: CPT

## 2021-11-28 PROCEDURE — 84402 ASSAY OF FREE TESTOSTERONE: CPT

## 2021-11-29 LAB
BACTERIA UR CULT: ABNORMAL
ENDOMYSIUM IGA SER QL: NEGATIVE
GLIADIN PEPTIDE IGA SER-ACNC: 5 UNITS (ref 0–19)
GLIADIN PEPTIDE IGG SER-ACNC: 2 UNITS (ref 0–19)
IGA SERPL-MCNC: 217 MG/DL (ref 87–352)
RYE IGE QN: NEGATIVE
TESTOST FREE SERPL-MCNC: 4.2 PG/ML (ref 0–4.2)
TESTOST SERPL-MCNC: 54 NG/DL (ref 13–71)
TTG IGA SER-ACNC: <2 U/ML (ref 0–3)
TTG IGG SER-ACNC: <2 U/ML (ref 0–5)

## 2021-11-30 ENCOUNTER — APPOINTMENT (OUTPATIENT)
Dept: LAB | Age: 21
End: 2021-11-30
Payer: COMMERCIAL

## 2021-11-30 DIAGNOSIS — R79.89 HIGH SERUM CORTISOL: Primary | ICD-10-CM

## 2021-11-30 DIAGNOSIS — E51.9 THIAMINE DEFICIENCY: ICD-10-CM

## 2021-11-30 DIAGNOSIS — E55.9 VITAMIN D DEFICIENCY: ICD-10-CM

## 2021-11-30 DIAGNOSIS — E53.8 CYANOCOBALAMIN DEFICIENCY: ICD-10-CM

## 2021-11-30 DIAGNOSIS — E53.1 PYRIDOXINE DEFICIENCY: ICD-10-CM

## 2021-11-30 DIAGNOSIS — E54 ASCORBIC ACID DEFICIENCY: ICD-10-CM

## 2021-11-30 DIAGNOSIS — E50.9 VITAMIN A DEFICIENCY: ICD-10-CM

## 2021-11-30 DIAGNOSIS — L65.9 THINNING HAIR: ICD-10-CM

## 2021-11-30 DIAGNOSIS — R79.89 ABNORMAL TSH: ICD-10-CM

## 2021-11-30 DIAGNOSIS — E60 ZINC DEFICIENCY: ICD-10-CM

## 2021-11-30 PROCEDURE — 82180 ASSAY OF ASCORBIC ACID: CPT

## 2021-11-30 PROCEDURE — 36415 COLL VENOUS BLD VENIPUNCTURE: CPT

## 2021-11-30 RX ORDER — NORGESTIMATE AND ETHINYL ESTRADIOL 7DAYSX3 LO
1 KIT ORAL DAILY
Qty: 84 TABLET | Refills: 0 | Status: SHIPPED | OUTPATIENT
Start: 2021-11-30 | End: 2021-12-02 | Stop reason: SDUPTHER

## 2021-12-02 ENCOUNTER — ANNUAL EXAM (OUTPATIENT)
Dept: OBGYN CLINIC | Facility: CLINIC | Age: 21
End: 2021-12-02
Payer: COMMERCIAL

## 2021-12-02 VITALS
HEIGHT: 61 IN | BODY MASS INDEX: 24.35 KG/M2 | SYSTOLIC BLOOD PRESSURE: 102 MMHG | DIASTOLIC BLOOD PRESSURE: 66 MMHG | WEIGHT: 129 LBS

## 2021-12-02 DIAGNOSIS — Z01.419 ENCOUNTER FOR ANNUAL ROUTINE GYNECOLOGICAL EXAMINATION: Primary | ICD-10-CM

## 2021-12-02 DIAGNOSIS — Z11.3 SCREENING EXAMINATION FOR SEXUALLY TRANSMITTED DISEASE: ICD-10-CM

## 2021-12-02 DIAGNOSIS — Z30.41 ENCOUNTER FOR SURVEILLANCE OF CONTRACEPTIVE PILLS: ICD-10-CM

## 2021-12-02 LAB
VIT A SERPL-MCNC: 29.7 UG/DL (ref 18.9–57.3)
VIT B1 BLD-SCNC: 115.4 NMOL/L (ref 66.5–200)
ZINC SERPL-MCNC: 78 UG/DL (ref 44–115)

## 2021-12-02 PROCEDURE — 87591 N.GONORRHOEAE DNA AMP PROB: CPT | Performed by: OBSTETRICS & GYNECOLOGY

## 2021-12-02 PROCEDURE — 87491 CHLMYD TRACH DNA AMP PROBE: CPT | Performed by: OBSTETRICS & GYNECOLOGY

## 2021-12-02 PROCEDURE — 99395 PREV VISIT EST AGE 18-39: CPT | Performed by: OBSTETRICS & GYNECOLOGY

## 2021-12-02 RX ORDER — NORGESTIMATE AND ETHINYL ESTRADIOL 7DAYSX3 LO
1 KIT ORAL DAILY
Qty: 84 TABLET | Refills: 4 | Status: SHIPPED | OUTPATIENT
Start: 2021-12-02

## 2021-12-03 LAB
C TRACH DNA SPEC QL NAA+PROBE: NEGATIVE
N GONORRHOEA DNA SPEC QL NAA+PROBE: NEGATIVE
VIT C SERPL-MCNC: 1 MG/DL (ref 0.4–2)

## 2021-12-09 LAB — VIT B6 SERPL-MCNC: 21.7 UG/L (ref 2–32.8)

## 2021-12-15 ENCOUNTER — TELEPHONE (OUTPATIENT)
Dept: INTERNAL MEDICINE CLINIC | Facility: CLINIC | Age: 21
End: 2021-12-15

## 2021-12-15 DIAGNOSIS — Z20.822 EXPOSURE TO COVID-19 VIRUS: Primary | ICD-10-CM

## 2021-12-18 ENCOUNTER — HOSPITAL ENCOUNTER (EMERGENCY)
Facility: HOSPITAL | Age: 21
Discharge: HOME | End: 2021-12-18
Attending: EMERGENCY MEDICINE
Payer: COMMERCIAL

## 2021-12-18 VITALS
RESPIRATION RATE: 20 BRPM | HEART RATE: 76 BPM | DIASTOLIC BLOOD PRESSURE: 79 MMHG | SYSTOLIC BLOOD PRESSURE: 117 MMHG | TEMPERATURE: 98 F | OXYGEN SATURATION: 98 %

## 2021-12-18 DIAGNOSIS — S06.0X9A CONCUSSION: ICD-10-CM
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
## 2021-12-18 PROCEDURE — 99284 EMERGENCY DEPT VISIT MOD MDM: CPT | Performed by: EMERGENCY MEDICINE

## 2021-12-18 PROCEDURE — 99283 EMERGENCY DEPT VISIT LOW MDM: CPT

## 2021-12-18 RX ORDER — IBUPROFEN 600 MG/1
600 TABLET ORAL ONCE
Status: COMPLETED | OUTPATIENT
Start: 2021-12-18 | End: 2021-12-18

## 2021-12-18 RX ORDER — ONDANSETRON 4 MG/1
4 TABLET, ORALLY DISINTEGRATING ORAL EVERY 6 HOURS PRN
Qty: 20 TABLET | Refills: 0 | Status: SHIPPED | OUTPATIENT
Start: 2021-12-18

## 2021-12-18 RX ORDER — ONDANSETRON 4 MG/1
4 TABLET, ORALLY DISINTEGRATING ORAL ONCE
Status: COMPLETED | OUTPATIENT
Start: 2021-12-18 | End: 2021-12-18

## 2021-12-18 RX ADMIN — ONDANSETRON 4 MG: 4 TABLET, ORALLY DISINTEGRATING ORAL at 03:24

## 2021-12-18 RX ADMIN — IBUPROFEN 600 MG: 600 TABLET, FILM COATED ORAL at 03:24

## 2021-12-31 ENCOUNTER — APPOINTMENT (OUTPATIENT)
Dept: LAB | Age: 21
End: 2021-12-31
Payer: COMMERCIAL

## 2021-12-31 DIAGNOSIS — R79.89 ABNORMAL TSH: ICD-10-CM

## 2021-12-31 DIAGNOSIS — R79.89 HIGH SERUM CORTISOL: ICD-10-CM

## 2021-12-31 LAB — T3FREE SERPL-MCNC: 2.26 PG/ML (ref 2.91–4.53)

## 2021-12-31 PROCEDURE — 36415 COLL VENOUS BLD VENIPUNCTURE: CPT

## 2021-12-31 PROCEDURE — 82024 ASSAY OF ACTH: CPT

## 2021-12-31 PROCEDURE — 86376 MICROSOMAL ANTIBODY EACH: CPT

## 2021-12-31 PROCEDURE — 84481 FREE ASSAY (FT-3): CPT

## 2022-01-01 LAB
ACTH PLAS-MCNC: 25.6 PG/ML (ref 7.2–63.3)
THYROPEROXIDASE AB SERPL-ACNC: <8 IU/ML (ref 0–34)

## 2022-01-10 DIAGNOSIS — F41.9 ANXIETY AND DEPRESSION: ICD-10-CM

## 2022-01-10 DIAGNOSIS — F32.A ANXIETY AND DEPRESSION: ICD-10-CM

## 2022-01-11 RX ORDER — VENLAFAXINE HYDROCHLORIDE 75 MG/1
CAPSULE, EXTENDED RELEASE ORAL
Qty: 30 CAPSULE | Refills: 0 | Status: SHIPPED | OUTPATIENT
Start: 2022-01-11 | End: 2022-02-10 | Stop reason: SDUPTHER

## 2022-01-19 ENCOUNTER — OFFICE VISIT (OUTPATIENT)
Dept: FAMILY MEDICINE CLINIC | Facility: CLINIC | Age: 22
End: 2022-01-19
Payer: COMMERCIAL

## 2022-01-19 VITALS
TEMPERATURE: 98.2 F | HEIGHT: 61 IN | WEIGHT: 130 LBS | BODY MASS INDEX: 24.55 KG/M2 | SYSTOLIC BLOOD PRESSURE: 100 MMHG | HEART RATE: 87 BPM | OXYGEN SATURATION: 98 % | DIASTOLIC BLOOD PRESSURE: 70 MMHG

## 2022-01-19 DIAGNOSIS — R79.89 ABNORMAL TSH: Primary | ICD-10-CM

## 2022-01-19 DIAGNOSIS — E55.9 VITAMIN D DEFICIENCY: ICD-10-CM

## 2022-01-19 DIAGNOSIS — R79.89 HIGH SERUM CORTISOL: ICD-10-CM
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
## 2022-01-19 PROCEDURE — 99214 OFFICE O/P EST MOD 30 MIN: CPT | Performed by: FAMILY MEDICINE
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
## 2022-01-26 ENCOUNTER — HOSPITAL ENCOUNTER (OUTPATIENT)
Dept: RADIOLOGY | Age: 22
Discharge: HOME | End: 2022-01-26
Payer: COMMERCIAL

## 2022-01-26 DIAGNOSIS — R79.89 ABNORMAL TSH: ICD-10-CM

## 2022-01-26 PROCEDURE — 76536 US EXAM OF HEAD AND NECK: CPT

## 2022-02-17 ENCOUNTER — TELEMEDICINE (OUTPATIENT)
Dept: FAMILY MEDICINE CLINIC | Facility: CLINIC | Age: 22
End: 2022-02-17
Payer: COMMERCIAL

## 2022-02-17 VITALS — BODY MASS INDEX: 24.55 KG/M2 | WEIGHT: 130 LBS | HEIGHT: 61 IN

## 2022-02-17 DIAGNOSIS — F33.1 MODERATE EPISODE OF RECURRENT MAJOR DEPRESSIVE DISORDER (HCC): Primary | ICD-10-CM

## 2022-02-17 DIAGNOSIS — F41.9 ANXIETY AND DEPRESSION: ICD-10-CM

## 2022-02-17 DIAGNOSIS — F32.A ANXIETY AND DEPRESSION: ICD-10-CM

## 2022-02-17 PROCEDURE — 99213 OFFICE O/P EST LOW 20 MIN: CPT | Performed by: FAMILY MEDICINE

## 2022-02-17 RX ORDER — VENLAFAXINE HYDROCHLORIDE 75 MG/1
150 CAPSULE, EXTENDED RELEASE ORAL
Qty: 30 CAPSULE | Refills: 0
Start: 2022-02-17 | End: 2022-03-07 | Stop reason: SDUPTHER

## 2022-03-04 ENCOUNTER — TELEPHONE (OUTPATIENT)
Dept: FAMILY MEDICINE CLINIC | Facility: CLINIC | Age: 22
End: 2022-03-04

## 2022-03-04 DIAGNOSIS — F32.A ANXIETY AND DEPRESSION: Primary | ICD-10-CM

## 2022-03-04 DIAGNOSIS — F41.9 ANXIETY AND DEPRESSION: Primary | ICD-10-CM

## 2022-03-04 RX ORDER — BUPROPION HYDROCHLORIDE 75 MG/1
TABLET ORAL
Qty: 30 TABLET | Refills: 1 | Status: SHIPPED | OUTPATIENT
Start: 2022-03-04 | End: 2022-03-30 | Stop reason: SDUPTHER

## 2022-03-04 NOTE — TELEPHONE ENCOUNTER
Pt called - she was on an increased dose of Venlafaxine 150mg in AM - pt did not notice a difference - next step or continue at this dose for now?  Refill will be needed if continuing at this dose

## 2022-03-04 NOTE — TELEPHONE ENCOUNTER
Please let pt know I called in for wellbutrin 75 mg daily to start in morning, she can decrease venlafaxine to 75 mg at bedtime   Need follow up in 2 weeks

## 2022-03-07 ENCOUNTER — TELEPHONE (OUTPATIENT)
Dept: FAMILY MEDICINE CLINIC | Facility: CLINIC | Age: 22
End: 2022-03-07

## 2022-03-07 DIAGNOSIS — F41.9 ANXIETY AND DEPRESSION: ICD-10-CM

## 2022-03-07 DIAGNOSIS — F32.A ANXIETY AND DEPRESSION: ICD-10-CM

## 2022-03-07 RX ORDER — VENLAFAXINE HYDROCHLORIDE 75 MG/1
75 CAPSULE, EXTENDED RELEASE ORAL
Qty: 30 CAPSULE | Refills: 1
Start: 2022-03-07 | End: 2022-03-07 | Stop reason: SDUPTHER

## 2022-03-07 RX ORDER — VENLAFAXINE HYDROCHLORIDE 75 MG/1
CAPSULE, EXTENDED RELEASE ORAL
Qty: 30 CAPSULE | Refills: 1 | Status: SHIPPED | OUTPATIENT
Start: 2022-03-07 | End: 2022-03-30 | Stop reason: SDUPTHER

## 2022-03-07 NOTE — TELEPHONE ENCOUNTER
Dr Pollo Alford med was sent to "no print" and was not sent to pharmacy  Can you please try and resend it?  TY

## 2022-03-07 NOTE — TELEPHONE ENCOUNTER
I scheduled for 2/3 week f/u with you  Pt forgot to mention that she is completely out of Effexor and needs a refill

## 2022-03-07 NOTE — TELEPHONE ENCOUNTER
Patient called she said she sent a message through my chart she has nausea and vomiting  And is shaky   Please advise thank you

## 2022-03-12 DIAGNOSIS — Z30.41 ENCOUNTER FOR SURVEILLANCE OF CONTRACEPTIVE PILLS: ICD-10-CM

## 2022-03-12 RX ORDER — NORGESTIMATE AND ETHINYL ESTRADIOL 7DAYSX3 LO
1 KIT ORAL DAILY
Qty: 84 TABLET | Refills: 0 | Status: CANCELLED | OUTPATIENT
Start: 2022-03-12

## 2022-03-14 DIAGNOSIS — Z30.41 ENCOUNTER FOR SURVEILLANCE OF CONTRACEPTIVE PILLS: ICD-10-CM

## 2022-03-14 RX ORDER — NORGESTIMATE AND ETHINYL ESTRADIOL 7DAYSX3 LO
1 KIT ORAL DAILY
Qty: 84 TABLET | Refills: 0 | Status: CANCELLED | OUTPATIENT
Start: 2022-03-14

## 2022-03-28 ENCOUNTER — CONSULT (OUTPATIENT)
Dept: ENDOCRINOLOGY | Facility: CLINIC | Age: 22
End: 2022-03-28
Payer: COMMERCIAL

## 2022-03-28 VITALS
BODY MASS INDEX: 24.84 KG/M2 | WEIGHT: 131.6 LBS | HEIGHT: 61 IN | DIASTOLIC BLOOD PRESSURE: 82 MMHG | HEART RATE: 69 BPM | SYSTOLIC BLOOD PRESSURE: 112 MMHG

## 2022-03-28 DIAGNOSIS — R79.89 HIGH SERUM CORTISOL: ICD-10-CM

## 2022-03-28 DIAGNOSIS — R79.89 ABNORMAL TSH: ICD-10-CM

## 2022-03-28 PROCEDURE — 99243 OFF/OP CNSLTJ NEW/EST LOW 30: CPT | Performed by: INTERNAL MEDICINE
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
## 2022-03-30 ENCOUNTER — OFFICE VISIT (OUTPATIENT)
Dept: FAMILY MEDICINE CLINIC | Facility: CLINIC | Age: 22
End: 2022-03-30
Payer: COMMERCIAL

## 2022-03-30 VITALS
BODY MASS INDEX: 24.35 KG/M2 | WEIGHT: 129 LBS | HEART RATE: 105 BPM | SYSTOLIC BLOOD PRESSURE: 100 MMHG | HEIGHT: 61 IN | DIASTOLIC BLOOD PRESSURE: 70 MMHG | TEMPERATURE: 98.2 F | OXYGEN SATURATION: 97 %

## 2022-03-30 DIAGNOSIS — F32.A ANXIETY AND DEPRESSION: ICD-10-CM

## 2022-03-30 DIAGNOSIS — F34.1 PRIMARY DYSTHYMIA EARLY ONSET: Primary | ICD-10-CM

## 2022-03-30 DIAGNOSIS — F41.9 ANXIETY AND DEPRESSION: ICD-10-CM

## 2022-03-30 PROCEDURE — 99215 OFFICE O/P EST HI 40 MIN: CPT | Performed by: FAMILY MEDICINE

## 2022-03-30 RX ORDER — BUPROPION HYDROCHLORIDE 75 MG/1
TABLET ORAL
Qty: 60 TABLET | Refills: 2 | Status: SHIPPED | OUTPATIENT
Start: 2022-03-30 | End: 2022-05-03 | Stop reason: SDUPTHER

## 2022-03-30 RX ORDER — VENLAFAXINE HYDROCHLORIDE 75 MG/1
CAPSULE, EXTENDED RELEASE ORAL
Qty: 30 CAPSULE | Refills: 1 | Status: SHIPPED | OUTPATIENT
Start: 2022-03-30 | End: 2022-05-02 | Stop reason: SDUPTHER

## 2022-03-30 NOTE — PROGRESS NOTES
BMI Counseling: Body mass index is 24 37 kg/m²  The BMI is above normal  Nutrition recommendations include decreasing portion sizes, encouraging healthy choices of fruits and vegetables, limiting drinks that contain sugar and reducing intake of cholesterol  Exercise recommendations include exercising 3-5 times per week  No pharmacotherapy was ordered  Rationale for BMI follow-up plan is due to patient being overweight or obese  Depression Screening and Follow-up Plan: Patient assessed for underlying major depression  Brief counseling provided and recommend additional follow-up/re-evaluation next office visit  Assessment/Plan:    Patient with major depression anxiety concern for mood disorder  Advised again to see psychiatrist   Multiple referral to private psychiatrist hopefully she get in to see 1 soon  Increase Wellbutrin to 150 mg in the morning and venlafaxine 75 mg also in the morning  She has trouble remember to take a medicine twice a day  Close monitor for her mood  Will rect will offer for complex care management/ and case management can keep in close eye on her  Advised to sees dermatologist for her hair  There is no alopecia and due to her depression and thyroid not a candidate for spironolactone  Advised against getting her buccal fat removed since she has very symmetrical face and she is very pretty there is no need to remove the mucosal fat since risk for distortion and wrinkles will be in the future  I have spent 40 minutes with Patient  today in which greater than 50% of this time was spent in counseling/coordination of care regarding Prognosis, Risks and benefits of tx options, Intructions for management, Patient and family education and Importance of tx compliance           Problem List Items Addressed This Visit        Other    Primary dysthymia early onset - Primary    Relevant Medications    buPROPion (WELLBUTRIN) 75 mg tablet    venlafaxine (EFFEXOR-XR) 75 mg 24 hr capsule    Anxiety and depression    Relevant Medications    buPROPion (WELLBUTRIN) 75 mg tablet    venlafaxine (EFFEXOR-XR) 75 mg 24 hr capsule    Other Relevant Orders    Ambulatory Referral to Psychiatry    Ambulatory Referral to Psychiatry    Ambulatory Referral to Psychiatry            Subjective:      Patient ID: Patty Chiu is a 25 y o  female  36-year-old female follow-up depression anxiety  She was on venlafaxine for long time at 37 5 mg that did not help her mood she is constantly depressed sad anxious  We tried increase the venlafaxine dose to 150 mg daily that make her feel worse  Then Wellbutrin was added in the morning and 75 mg daily and venlafaxine at night  She did not notice any improvement in her mood  She continued to be depressed sad crying a lot  She does have a therapist she sees weekly but that did not help she does not like cut therapist   She has trouble getting in to see psychiatrist there was no availability  She continues to go to school full-time  She would like to see to get treatment for her thinning hair  She is adopted  She does not know any family history of hair loss  She also does not like how she looks  She would like to see the specialist to remove her buccal fat in her face  The following portions of the patient's history were reviewed and updated as appropriate:   Past Medical History:  She has a past medical history of Asthma exacerbation, mild, Chronic tonsillitis, Eczema, Fracture of phalanx of right index finger, Menorrhagia, Obstructive sleep apnea of child, Orthostatic dizziness, Premenarchal, and Seasonal allergies  ,  _______________________________________________________________________  Medical Problems:  does not have any pertinent problems on file ,  _______________________________________________________________________  Past Surgical History:   has a past surgical history that includes Tonsillectomy and adenoidectomy  ,  _______________________________________________________________________  Family History:  family history is not on file  She was adopted  ,  _______________________________________________________________________  Social History:   reports that she has never smoked  She has never used smokeless tobacco  She reports that she does not drink alcohol and does not use drugs  ,  _______________________________________________________________________  Allergies:  is allergic to other and pollen extract     _______________________________________________________________________  Current Outpatient Medications   Medication Sig Dispense Refill    buPROPion (WELLBUTRIN) 75 mg tablet 2 tab in am 60 tablet 2    levalbuterol (XOPENEX HFA) 45 mcg/act inhaler Inhale 1-2 puffs every 4 (four) hours as needed for wheezing 15 g 1    levalbuterol (Xopenex) 0 63 mg/3 mL nebulizer solution Take 3 mL (0 63 mg total) by nebulization every 8 (eight) hours as needed for wheezing or shortness of breath 3 mL 1    norgestimate-ethinyl estradiol (ORTHO TRI-CYCLEN LO) 0 18/0 215/0 25 MG-25 MCG per tablet Take 1 tablet by mouth daily 84 tablet 4    ondansetron (Zofran ODT) 4 mg disintegrating tablet Take 1 tablet (4 mg total) by mouth every 6 (six) hours as needed for nausea or vomiting 20 tablet 0    Pediatric Multivit-Minerals-C (MULTIVITAMIN GUMMIES CHILDRENS) CHEW Chew 1 tablet daily        venlafaxine (EFFEXOR-XR) 75 mg 24 hr capsule 1 tab daily in morning 30 capsule 1    Biotin 1000 MCG tablet Take 3,000 mcg by mouth daily   (Patient not taking: Reported on 3/28/2022 )       Current Facility-Administered Medications   Medication Dose Route Frequency Provider Last Rate Last Admin    levalbuterol (XOPENEX) inhalation solution 0 63 mg  0 63 mg Nebulization Q8H PRN Mehrdad Clement PA-C   0 63 mg at 02/08/19 1805     _______________________________________________________________________  Review of Systems Constitutional: Negative for activity change, appetite change, fatigue and unexpected weight change  HENT: Negative for ear pain, sore throat, trouble swallowing and voice change  Eyes: Negative for photophobia and visual disturbance  Respiratory: Negative for cough, chest tightness, shortness of breath and wheezing  Cardiovascular: Negative for chest pain, palpitations and leg swelling  Gastrointestinal: Negative for abdominal pain, constipation, diarrhea, nausea, rectal pain and vomiting  Endocrine: Negative for cold intolerance, polydipsia and polyuria  Genitourinary: Negative for difficulty urinating, dysuria, flank pain, menstrual problem and pelvic pain  Musculoskeletal: Negative for arthralgias, joint swelling and myalgias  Skin: Negative for color change and rash  Allergic/Immunologic: Negative for environmental allergies and immunocompromised state  Neurological: Negative for dizziness, weakness, numbness and headaches  Hematological: Negative for adenopathy  Does not bruise/bleed easily  Psychiatric/Behavioral: Positive for decreased concentration, dysphoric mood and sleep disturbance  Negative for self-injury and suicidal ideas  The patient is nervous/anxious  Objective:  Vitals:    03/30/22 1327   BP: 100/70   BP Location: Left arm   Patient Position: Sitting   Cuff Size: Standard   Pulse: 105   Temp: 98 2 °F (36 8 °C)   TempSrc: Tympanic   SpO2: 97%   Weight: 58 5 kg (129 lb)   Height: 5' 1" (1 549 m)     Body mass index is 24 37 kg/m²  Physical Exam  Vitals and nursing note reviewed  Constitutional:       Appearance: Normal appearance  HENT:      Head: Normocephalic and atraumatic  Pulmonary:      Effort: Pulmonary effort is normal       Breath sounds: Normal breath sounds  Neurological:      General: No focal deficit present  Mental Status: She is alert and oriented to person, place, and time  Mental status is at baseline     Psychiatric: Mood and Affect: Mood normal          Behavior: Behavior normal          Thought Content:  Thought content normal          Judgment: Judgment normal

## 2022-03-31 ENCOUNTER — TELEPHONE (OUTPATIENT)
Dept: PSYCHIATRY | Facility: CLINIC | Age: 22
End: 2022-03-31

## 2022-04-18 NOTE — TELEPHONE ENCOUNTER
PT gave verbal consent that we can speak to pt mom   Inform mom of the wait list and confirmed pt is still on the wait list

## 2022-04-20 ENCOUNTER — TELEPHONE (OUTPATIENT)
Dept: FAMILY MEDICINE CLINIC | Facility: CLINIC | Age: 22
End: 2022-04-20

## 2022-04-20 DIAGNOSIS — B34.9 VIRAL INFECTION, UNSPECIFIED: Primary | ICD-10-CM

## 2022-04-20 PROCEDURE — 87636 SARSCOV2 & INF A&B AMP PRB: CPT | Performed by: FAMILY MEDICINE

## 2022-04-21 LAB
FLUAV RNA RESP QL NAA+PROBE: NEGATIVE
FLUBV RNA RESP QL NAA+PROBE: NEGATIVE
SARS-COV-2 RNA RESP QL NAA+PROBE: NEGATIVE

## 2022-05-02 DIAGNOSIS — F32.A ANXIETY AND DEPRESSION: ICD-10-CM

## 2022-05-02 DIAGNOSIS — F41.9 ANXIETY AND DEPRESSION: ICD-10-CM

## 2022-05-02 RX ORDER — VENLAFAXINE HYDROCHLORIDE 75 MG/1
CAPSULE, EXTENDED RELEASE ORAL
Qty: 30 CAPSULE | Refills: 0 | Status: SHIPPED | OUTPATIENT
Start: 2022-05-02 | End: 2022-05-30 | Stop reason: SDUPTHER

## 2022-05-03 DIAGNOSIS — F41.9 ANXIETY AND DEPRESSION: ICD-10-CM

## 2022-05-03 DIAGNOSIS — F32.A ANXIETY AND DEPRESSION: ICD-10-CM

## 2022-05-03 RX ORDER — BUPROPION HYDROCHLORIDE 75 MG/1
TABLET ORAL
Qty: 60 TABLET | Refills: 0 | Status: SHIPPED | OUTPATIENT
Start: 2022-05-03 | End: 2022-05-30 | Stop reason: SDUPTHER

## 2022-05-06 ENCOUNTER — TELEPHONE (OUTPATIENT)
Dept: FAMILY MEDICINE CLINIC | Facility: CLINIC | Age: 22
End: 2022-05-06

## 2022-05-09 ENCOUNTER — TELEPHONE (OUTPATIENT)
Dept: PSYCHIATRY | Facility: CLINIC | Age: 22
End: 2022-05-09

## 2022-05-30 DIAGNOSIS — F41.9 ANXIETY AND DEPRESSION: ICD-10-CM

## 2022-05-30 DIAGNOSIS — Z30.41 ENCOUNTER FOR SURVEILLANCE OF CONTRACEPTIVE PILLS: ICD-10-CM

## 2022-05-30 DIAGNOSIS — F32.A ANXIETY AND DEPRESSION: ICD-10-CM

## 2022-05-30 RX ORDER — NORGESTIMATE AND ETHINYL ESTRADIOL 7DAYSX3 LO
1 KIT ORAL DAILY
Qty: 84 TABLET | Refills: 0 | Status: CANCELLED | OUTPATIENT
Start: 2022-05-30

## 2022-05-31 RX ORDER — VENLAFAXINE HYDROCHLORIDE 75 MG/1
CAPSULE, EXTENDED RELEASE ORAL
Qty: 30 CAPSULE | Refills: 0 | Status: SHIPPED | OUTPATIENT
Start: 2022-05-31 | End: 2022-07-03 | Stop reason: SDUPTHER

## 2022-05-31 RX ORDER — BUPROPION HYDROCHLORIDE 75 MG/1
TABLET ORAL
Qty: 60 TABLET | Refills: 0 | Status: SHIPPED | OUTPATIENT
Start: 2022-05-31 | End: 2022-06-03 | Stop reason: SDUPTHER

## 2022-05-31 NOTE — TELEPHONE ENCOUNTER
Lm pt's as re: has existing rfs orhto [facility] from 12/2022 to [facility]) - can have presc transferred to [facility])

## 2022-06-03 ENCOUNTER — NURSE TRIAGE (OUTPATIENT)
Dept: OTHER | Facility: OTHER | Age: 22
End: 2022-06-03

## 2022-06-03 DIAGNOSIS — F41.9 ANXIETY AND DEPRESSION: ICD-10-CM

## 2022-06-03 DIAGNOSIS — F32.A ANXIETY AND DEPRESSION: ICD-10-CM

## 2022-06-03 RX ORDER — BUPROPION HYDROCHLORIDE 75 MG/1
TABLET ORAL
Qty: 14 TABLET | Refills: 0 | Status: SHIPPED | OUTPATIENT
Start: 2022-06-03 | End: 2022-06-06 | Stop reason: SDUPTHER

## 2022-06-04 NOTE — TELEPHONE ENCOUNTER
Reason for Disposition   [1] Caller requesting a prescription renewal (no refills left), no triage required, AND [2] triager able to renew prescription per department policy    Answer Assessment - Initial Assessment Questions  1  DRUG NAME: "What medicine do you need to have refilled?"     buPROPion (WELLBUTRIN) 75 mg tablet  2  REFILLS REMAINING: "How many refills are remaining?" (Note: The label on the medicine or pill bottle will show how many refills are remaining  If there are no refills remaining, then a renewal may be needed )     n/a  3  EXPIRATION DATE: "What is the expiration date?" (Note: The label states when the prescription will , and thus can no longer be refilled )      n/a  4  PRESCRIBING HCP: "Who prescribed it?" Reason: If prescribed by specialist, call should be referred to that group  PCP  5  SYMPTOMS: "Do you have any symptoms?"      n/a  6   PREGNANCY: "Is there any chance that you are pregnant?" "When was your last menstrual period?"      LMP:    Protocols used: MEDICATION REFILL AND RENEWAL CALL-[facility]
Regarding: medication refill urgent   ----- Message from Ben Barrett sent at 6/3/2022  8:14 PM EDT -----   Pt called, to check on status of when she will receive call  Pt was informed a nurse should be giving her a call promptly  Also informed of recent estimation time while also explaining that there might be changes of estimation time  ----- Message from Loren Castellanos sent at 6/3/2022  6:57 PM EDT -----  "I currently use mail order for my prescriptions I will be leaving on Sunday and my medication will not get here in time  I am looking for 5 or 6 days and I take 2 twice a day   I need my buPROPion [facility]) 75 mg tablet and please sent to [facility] [address], [address]  Call [phone]
Requesting buPROPion (WELLBUTRIN) 75 mg tablet (2 tabs QD)  Patient reports none left  Mail order will not arrive until next week  Patient leaving, and will be without medication  Medication marked as essential  Courtesy seven-day supply given per protocol  Sent to pharmacy (verified) of choice  Patient informed, verbalized understanding 
complains of pain/discomfort

## 2022-06-06 DIAGNOSIS — F32.A ANXIETY AND DEPRESSION: ICD-10-CM

## 2022-06-06 DIAGNOSIS — F41.9 ANXIETY AND DEPRESSION: ICD-10-CM

## 2022-06-06 RX ORDER — BUPROPION HYDROCHLORIDE 75 MG/1
TABLET ORAL
Qty: 60 TABLET | Refills: 1 | Status: SHIPPED | OUTPATIENT
Start: 2022-06-06 | End: 2022-07-22 | Stop reason: SDUPTHER

## 2022-07-03 DIAGNOSIS — F41.9 ANXIETY AND DEPRESSION: ICD-10-CM

## 2022-07-03 DIAGNOSIS — F32.A ANXIETY AND DEPRESSION: ICD-10-CM

## 2022-07-05 RX ORDER — VENLAFAXINE HYDROCHLORIDE 75 MG/1
CAPSULE, EXTENDED RELEASE ORAL
Qty: 30 CAPSULE | Refills: 0 | Status: SHIPPED | OUTPATIENT
Start: 2022-07-05 | End: 2022-07-24 | Stop reason: SDUPTHER

## 2022-07-22 DIAGNOSIS — F32.A ANXIETY AND DEPRESSION: ICD-10-CM

## 2022-07-22 DIAGNOSIS — F41.9 ANXIETY AND DEPRESSION: ICD-10-CM

## 2022-07-24 DIAGNOSIS — Z30.41 ENCOUNTER FOR SURVEILLANCE OF CONTRACEPTIVE PILLS: ICD-10-CM

## 2022-07-24 DIAGNOSIS — F41.9 ANXIETY AND DEPRESSION: ICD-10-CM

## 2022-07-24 DIAGNOSIS — F32.A ANXIETY AND DEPRESSION: ICD-10-CM

## 2022-07-24 RX ORDER — NORGESTIMATE AND ETHINYL ESTRADIOL 7DAYSX3 LO
1 KIT ORAL DAILY
Qty: 84 TABLET | Refills: 0 | Status: CANCELLED | OUTPATIENT
Start: 2022-07-24

## 2022-07-27 RX ORDER — BUPROPION HYDROCHLORIDE 75 MG/1
TABLET ORAL
Qty: 60 TABLET | Refills: 0 | Status: SHIPPED | OUTPATIENT
Start: 2022-07-27 | End: 2022-09-29 | Stop reason: SDUPTHER

## 2022-07-27 RX ORDER — VENLAFAXINE HYDROCHLORIDE 75 MG/1
CAPSULE, EXTENDED RELEASE ORAL
Qty: 30 CAPSULE | Refills: 0 | Status: SHIPPED | OUTPATIENT
Start: 2022-07-27 | End: 2022-09-29 | Stop reason: SDUPTHER

## 2022-08-22 DIAGNOSIS — Z30.41 ENCOUNTER FOR SURVEILLANCE OF CONTRACEPTIVE PILLS: ICD-10-CM

## 2022-08-22 RX ORDER — NORGESTIMATE AND ETHINYL ESTRADIOL 7DAYSX3 LO
1 KIT ORAL DAILY
Qty: 84 TABLET | Refills: 0 | Status: CANCELLED | OUTPATIENT
Start: 2022-08-22

## 2022-08-22 NOTE — TELEPHONE ENCOUNTER
Pt informed she has additional rfs on ocp presc - she will check with pharm if able to  now as she will need new pill pack in 1 wk otherwise to request override with pharm for 1 additional pill pack

## 2022-09-06 ENCOUNTER — OFFICE VISIT (OUTPATIENT)
Dept: INTERNAL MEDICINE CLINIC | Facility: CLINIC | Age: 22
End: 2022-09-06
Payer: COMMERCIAL

## 2022-09-06 VITALS
HEIGHT: 61 IN | OXYGEN SATURATION: 97 % | SYSTOLIC BLOOD PRESSURE: 112 MMHG | WEIGHT: 127 LBS | BODY MASS INDEX: 23.98 KG/M2 | HEART RATE: 94 BPM | DIASTOLIC BLOOD PRESSURE: 78 MMHG

## 2022-09-06 DIAGNOSIS — M54.50 ACUTE BILATERAL LOW BACK PAIN WITHOUT SCIATICA: Primary | ICD-10-CM

## 2022-09-06 PROCEDURE — 99213 OFFICE O/P EST LOW 20 MIN: CPT | Performed by: NURSE PRACTITIONER

## 2022-09-06 NOTE — PROGRESS NOTES
Triston   Evaristo Weeks is a 25 y o  female who presents for evaluation of low back pain  The patient has had recurrent self limited episodes of low back pain in the past  Pain is intermittent and related to weight training  Usually occurs when skipping pre-exercise stretching  Onset was related to / precipitated by no known injury and occurs after weight training session  The pain is located in the across the lower back and does not radiate  The pain is described as aching and occurs intermittently  She is currently in no pain  She has tried nothing for treatment  She denies weakness in the right leg, weakness in the left leg, tingling in the right leg, tingling in the left leg, burning pain in the right leg, burning pain in the left leg, urinary hesitancy, urinary incontinence, urinary retention, bowel incontinence, constipation and groin/perineal numbness associated with the back pain  The patient has no "red flag" history indicative of complicated back pain       The following portions of the patient's history were reviewed and updated as appropriate: allergies, current medications, past family history, past medical history, past social history, past surgical history and problem list     Review of Systems  Pertinent items are noted in HPI       Vitals:    09/06/22 1620   BP: 112/78   Pulse: 94   SpO2: 97%   Weight: 57 6 kg (127 lb)   Height: 5' 1" (1 549 m)     Objective   Physical Exam  Vitals and nursing note reviewed  Constitutional:       General: She is not in acute distress  Appearance: Normal appearance  Musculoskeletal:      Thoracic back: Normal       Lumbar back: Normal  No deformity, spasms, tenderness or bony tenderness  Normal range of motion  Neurological:      General: No focal deficit present  Mental Status: She is alert  Sensory: Sensation is intact  Motor: Motor function is intact  Coordination: Coordination is intact  Gait: Gait is intact  Assessment/Plan   low back pain secondary to poor back hygeine, inadequate stretching prior to weight training, lifting  The case discussed with patient using patient centered shared decision making  The patient was counseled regarding instructions for management,-- risk factor reductions,-- prognosis,-- impressions,-- risks and benefits of treatment options,-- importance of compliance with treatment  I have reviewed the instructions with the patient, answering all questions to her satisfaction  Natural history and expected course discussed  Questions answered  Proper lifting, bending technique discussed  Stretching exercises discussed  Regular aerobic and trunk strengthening exercises discussed  Heat to affected area as needed for local pain relief  OTC analgesics as needed  Follow-up in 2 weeks  Alejandro Dawn

## 2022-09-08 ENCOUNTER — TELEPHONE (OUTPATIENT)
Dept: PSYCHIATRY | Facility: CLINIC | Age: 22
End: 2022-09-08

## 2022-09-08 NOTE — TELEPHONE ENCOUNTER
contacted patient off med mgmt waitlist to verify needs of services in attempts to update waitlist  lvm for patient to contact intake dept

## 2022-09-29 DIAGNOSIS — F32.A ANXIETY AND DEPRESSION: ICD-10-CM

## 2022-09-29 DIAGNOSIS — F41.9 ANXIETY AND DEPRESSION: ICD-10-CM

## 2022-09-30 RX ORDER — BUPROPION HYDROCHLORIDE 75 MG/1
TABLET ORAL
Qty: 60 TABLET | Refills: 0 | Status: SHIPPED | OUTPATIENT
Start: 2022-09-30

## 2022-09-30 RX ORDER — VENLAFAXINE HYDROCHLORIDE 75 MG/1
CAPSULE, EXTENDED RELEASE ORAL
Qty: 30 CAPSULE | Refills: 0 | Status: SHIPPED | OUTPATIENT
Start: 2022-09-30
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
## 2022-11-01 DIAGNOSIS — F32.A ANXIETY AND DEPRESSION: ICD-10-CM

## 2022-11-01 DIAGNOSIS — F41.9 ANXIETY AND DEPRESSION: ICD-10-CM

## 2022-11-01 RX ORDER — BUPROPION HYDROCHLORIDE 75 MG/1
TABLET ORAL
Qty: 60 TABLET | Refills: 0 | Status: SHIPPED | OUTPATIENT
Start: 2022-11-01

## 2022-12-16 ENCOUNTER — OFFICE VISIT (OUTPATIENT)
Dept: INTERNAL MEDICINE CLINIC | Facility: CLINIC | Age: 22
End: 2022-12-16

## 2022-12-16 VITALS
BODY MASS INDEX: 23.92 KG/M2 | OXYGEN SATURATION: 100 % | DIASTOLIC BLOOD PRESSURE: 72 MMHG | TEMPERATURE: 98.7 F | WEIGHT: 130 LBS | SYSTOLIC BLOOD PRESSURE: 112 MMHG | HEART RATE: 115 BPM | HEIGHT: 62 IN

## 2022-12-16 DIAGNOSIS — Z13.31 POSITIVE DEPRESSION SCREENING: ICD-10-CM

## 2022-12-16 DIAGNOSIS — F32.A ANXIETY AND DEPRESSION: ICD-10-CM

## 2022-12-16 DIAGNOSIS — Z00.00 HEALTH CARE MAINTENANCE: Primary | ICD-10-CM

## 2022-12-16 DIAGNOSIS — R45.851 VERBALIZES SUICIDAL THOUGHTS: ICD-10-CM

## 2022-12-16 DIAGNOSIS — F41.9 ANXIETY AND DEPRESSION: ICD-10-CM

## 2022-12-16 RX ORDER — VENLAFAXINE HYDROCHLORIDE 75 MG/1
CAPSULE, EXTENDED RELEASE ORAL
Qty: 180 CAPSULE | Refills: 0 | Status: SHIPPED | OUTPATIENT
Start: 2022-12-16

## 2022-12-16 RX ORDER — BUPROPION HYDROCHLORIDE 75 MG/1
TABLET ORAL
Qty: 180 TABLET | Refills: 0 | Status: SHIPPED | OUTPATIENT
Start: 2022-12-16

## 2022-12-16 NOTE — PROGRESS NOTES
HEALTH MAINTENANCE OFFICE VISIT  [facility] - [facility]    NAME: Sonny Arnold  AGE: 25 y o  SEX: female  : 2000     DATE: 2022    Assessment and Plan     1  Health care maintenance    2  Anxiety and depression  -     buPROPion (WELLBUTRIN) 75 mg tablet; 2 tab in am  -     venlafaxine (EFFEXOR-XR) 75 mg 24 hr capsule; 1 tab daily in morning  -     Ambulatory Referral to Psychiatry; Future  -     Ambulatory Referral to [facility]; Future    3  Positive depression screening  -     Ambulatory Referral to Psychiatry; Future  -     Ambulatory Referral to [facility]; Future    4  Verbalizes suicidal thoughts  Comments:  not active at present  Orders:  -     Ambulatory Referral to Psychiatry; Future  -     Ambulatory Referral to [facility]; Future    Depression Screening Follow-up Plan: Patient's depression screening was positive with a PHQ-2 score of   Their PHQ-9 score was   Patient assessed for underlying major depression  They have no active suicidal ideations  Brief counseling provided and recommend additional follow-up/re-evaluation next office visit    Will arrange eval with psychiatrist and therapist  [facility] ED/911 for crisis intervention for escalating symptoms, Sis with plan    · Patient Counseling:   · Nutrition: Stressed importance of a well balanced diet, moderation of sodium/saturated fat, caloric balance and sufficient intake of fiber  · Exercise: Stressed the importance of regular exercise with a goal of 150 minutes per week  · Dental Health: Discussed daily flossing and brushing and regular dental visits   · Sexuality: Discussed sexually transmitted infections, use of condoms and prevention of unintended pregnancy  · Alcohol Use:  Recommended moderation of alcohol intake  · Injury Prevention: Discussed Safety Belts, Safety Helmets, and Smoke Detectors    · Immunizations reviewed: Up To Date  · Discussed benefits of:  Cervical Cancer screening  BMI Counseling: Body mass index is 23 44 kg/m²  Discussed with patient's BMI with her  Return in about 3 months (around 3/16/2023)  Chief Complaint     Chief Complaint   Patient presents with   • Physical Exam       History of Present Illness     HPI    Well Adult Physical   Patient here for a comprehensive physical exam       Diet and Physical Activity  Diet: well balanced diet  Exercise: weekly      Depression Screen  PHQ-2/9 Depression Screening    Little interest or pleasure in doing things: 2 - more than half the days  Feeling down, depressed, or hopeless: 2 - more than half the days  Trouble falling or staying asleep, or sleeping too much: 1 - several days  Feeling tired or having little energy: 2 - more than half the days  Poor appetite or overeatin - several days  Feeling bad about yourself - or that you are a failure or have let yourself or your family down: 3 - nearly every day  Trouble concentrating on things, such as reading the newspaper or watching television: 2 - more than half the days  Moving or speaking so slowly that other people could have noticed  Or the opposite - being so fidgety or restless that you have been moving around a lot more than usual: 0 - not at all  Thoughts that you would be better off dead, or of hurting yourself in some way: 3 - nearly every day          General Health  Hearing: Normal:  bilateral  Vision: goes for regular eye exams and wears glasses  Dental: regular dental visits    Reproductive Health  Regular Periods and Follows with gynecologist, gyn exam in next month      The following portions of the patient's history were reviewed and updated as appropriate: allergies, current medications, past family history, past medical history, past social history, past surgical history and problem list     Review of Systems     Review of Systems   Constitutional: Negative  Respiratory: Negative  Cardiovascular: Negative      Gastrointestinal: Negative  Endocrine: Negative  Neurological: Negative  Psychiatric/Behavioral: Positive for decreased concentration and dysphoric mood  Negative for self-injury and sleep disturbance  The patient is nervous/anxious  Occasional fleeting thoughts of death, suicide  Thoughts pass quickly  Pt denies h/o plan, attempt   "I would never act on it" has good support system       Past Medical History     Past Medical History:   Diagnosis Date   • Asthma exacerbation, mild     last assessed - 06Oct2016   • Chronic tonsillitis    • Eczema    • Fracture of phalanx of right index finger    • Menorrhagia     last assessed - [id number]   • Obstructive sleep apnea of child    • Orthostatic dizziness     last assessed - 20Nov2015   • Premenarchal    • Seasonal allergies     Resolved - [id number]       Past Surgical History     Past Surgical History:   Procedure Laterality Date   • TONSILLECTOMY AND ADENOIDECTOMY         Social History     Social History     Socioeconomic History   • Marital status: Single     Spouse name: None   • Number of children: None   • Years of education: None   • Highest education level: None   Occupational History   • None   Tobacco Use   • Smoking status: Never   • Smokeless tobacco: Never   • Tobacco comments:     No tobacco/smoke exposure   Vaping Use   • Vaping Use: Never used   Substance and Sexual Activity   • Alcohol use: Never   • Drug use: Never   • Sexual activity: Not Currently     Partners: Male     Birth control/protection: Condom Male, OCP   Other Topics Concern   • None   Social History Narrative    Activities: Musical instrument    Adopted child    Adopted child    Born in [place]    Brushes teeth twice a day    Dental care, regularly    Flosses teeth regularly    Lives with mother (single parent)    Parents are     Pets/Animals: Cat    Pets/Animals: Dog    Sleeps 8 - 10 hours a day     Social Determinants of Health     Financial Resource Strain: Not on [employer] Insecurity: Not on file   Transportation Needs: Not on file   Physical Activity: Not on file   Stress: Not on file   Social Connections: Not on file   Intimate Partner Violence: Not on file   Housing Stability: Not on file       Family History     Family History   Adopted: Yes       Current Medications       Current Outpatient Medications:   •  buPROPion (WELLBUTRIN) 75 mg tablet, 2 tab in am, Disp: 180 tablet, Rfl: 0  •  levalbuterol (XOPENEX HFA) 45 mcg/act inhaler, Inhale 1-2 puffs every 4 (four) hours as needed for wheezing, Disp: 15 g, Rfl: 1  •  levalbuterol (Xopenex) 0 63 mg/3 mL nebulizer solution, Take 3 mL (0 63 mg total) by nebulization every 8 (eight) hours as needed for wheezing or shortness of breath, Disp: 3 mL, Rfl: 1  •  norgestimate-ethinyl estradiol (ORTHO TRI-CYCLEN LO) 0 18/0 215/0 25 MG-25 MCG per tablet, Take 1 tablet by mouth daily, Disp: 28 tablet, Rfl: 0  •  venlafaxine (EFFEXOR-XR) 75 mg 24 hr capsule, 1 tab daily in morning, Disp: 180 capsule, Rfl: 0  •  ondansetron (Zofran ODT) 4 mg disintegrating tablet, Take 1 tablet (4 mg total) by mouth every 6 (six) hours as needed for nausea or vomiting (Patient not taking: Reported on 9/6/2022), Disp: 20 tablet, Rfl: 0  •  Pediatric Multivit-Minerals-C (MULTIVITAMIN GUMMIES CHILDRENS) CHEW, Chew 1 tablet daily   (Patient not taking: Reported on 9/6/2022), Disp: , Rfl:     Current Facility-Administered Medications:   •  levalbuterol (XOPENEX) inhalation solution 0 63 mg, 0 63 mg, Nebulization, Q8H PRN, Mehrdad Clement PA-C, 0 63 mg at 02/08/19 1805     Allergies     Allergies   Allergen Reactions   • Other      Other reaction(s): Asthma   • Pollen Extract Allergic Rhinitis and Wheezing       Objective     /72 (BP Location: Left arm, Patient Position: Sitting, Cuff Size: Adult)   Pulse (!) 115   Temp 98 7 °F (37 1 °C) (Tympanic)   Ht 5' 2 44" (1 586 m)   Wt 59 kg (130 lb)   SpO2 100%   BMI 23 44 kg/m²      Physical Exam  Vitals and nursing note reviewed  Constitutional:       General: She is not in acute distress  Appearance: Normal appearance  She is well-developed  She is not ill-appearing  HENT:      Head: Normocephalic and atraumatic  Eyes:      General: Lids are normal  Lids are everted, no foreign bodies appreciated  Conjunctiva/sclera: Conjunctivae normal       Pupils: Pupils are equal, round, and reactive to light  Neck:      Thyroid: No thyroid mass or thyromegaly  Vascular: No JVD  Cardiovascular:      Rate and Rhythm: Normal rate and regular rhythm  Pulses: Normal pulses  Heart sounds: Normal heart sounds  No murmur heard  Pulmonary:      Effort: Pulmonary effort is normal       Breath sounds: Normal breath sounds  Abdominal:      General: Bowel sounds are normal       Palpations: Abdomen is soft  There is no hepatomegaly or splenomegaly  Tenderness: There is no abdominal tenderness  Musculoskeletal:         General: No deformity  Cervical back: Normal range of motion and neck supple  Lymphadenopathy:      Cervical: No cervical adenopathy  Skin:     General: Skin is warm and dry  Coloration: Skin is not pale  Neurological:      General: No focal deficit present  Mental Status: She is alert  Mental status is at baseline  Sensory: No sensory deficit  Motor: No weakness  Psychiatric:         Mood and Affect: Mood normal          Behavior: Behavior normal          Thought Content: Thought content normal  Thought content does not include suicidal ideation  Thought content does not include suicidal plan  Judgment: Judgment normal            No results found          KETAN Torres  [facility]

## 2022-12-27 ENCOUNTER — OFFICE VISIT (OUTPATIENT)
Dept: OBGYN CLINIC | Facility: CLINIC | Age: 22
End: 2022-12-27

## 2022-12-27 VITALS
BODY MASS INDEX: 24.29 KG/M2 | DIASTOLIC BLOOD PRESSURE: 74 MMHG | HEIGHT: 62 IN | WEIGHT: 132 LBS | SYSTOLIC BLOOD PRESSURE: 110 MMHG

## 2022-12-27 DIAGNOSIS — Z11.3 SCREENING EXAMINATION FOR STD (SEXUALLY TRANSMITTED DISEASE): ICD-10-CM

## 2022-12-27 DIAGNOSIS — Z30.41 ENCOUNTER FOR SURVEILLANCE OF CONTRACEPTIVE PILLS: ICD-10-CM

## 2022-12-27 DIAGNOSIS — Z01.419 WOMEN'S ANNUAL ROUTINE GYNECOLOGICAL EXAMINATION: Primary | ICD-10-CM

## 2022-12-27 DIAGNOSIS — F41.9 ANXIETY AND DEPRESSION: ICD-10-CM

## 2022-12-27 DIAGNOSIS — R30.0 DYSURIA: ICD-10-CM

## 2022-12-27 DIAGNOSIS — F32.A ANXIETY AND DEPRESSION: ICD-10-CM

## 2022-12-27 LAB
SL AMB  POCT GLUCOSE, UA: ABNORMAL
SL AMB LEUKOCYTE ESTERASE,UA: ABNORMAL
SL AMB POCT BILIRUBIN,UA: ABNORMAL
SL AMB POCT BLOOD,UA: ABNORMAL
SL AMB POCT CLARITY,UA: CLEAR
SL AMB POCT COLOR,UA: YELLOW
SL AMB POCT KETONES,UA: ABNORMAL
SL AMB POCT NITRITE,UA: ABNORMAL
SL AMB POCT PH,UA: ABNORMAL
SL AMB POCT SPECIFIC GRAVITY,UA: 1.01
SL AMB POCT URINE PROTEIN: ABNORMAL
SL AMB POCT UROBILINOGEN: ABNORMAL

## 2022-12-27 RX ORDER — NORGESTIMATE AND ETHINYL ESTRADIOL 7DAYSX3 LO
1 KIT ORAL DAILY
Qty: 84 TABLET | Refills: 3 | Status: SHIPPED | OUTPATIENT
Start: 2022-12-27

## 2022-12-27 RX ORDER — BUPROPION HYDROCHLORIDE 75 MG/1
TABLET ORAL
Qty: 180 TABLET | Refills: 0 | Status: SHIPPED | OUTPATIENT
Start: 2022-12-27

## 2022-12-27 NOTE — PROGRESS NOTES
Subjective    HPI:     Medina Sol is a 25 y o  nulliparous female  Her menstrual cycles are regular and predictabl  Her current method of contraception includes OCP  She denies issues with intimacy  She denies GI and Gyn complaints  She states she was experiencing some burning with urination yesterday which has resolved  She feels safe at home  She states her depression/anxiety is stable on her current regimen  Medical, surgical and family history reviewed  Her dental care is up-to-date  She eats a healthy diet and exercises regularly  She is happy with her weight  Gynecologic History    Patient's last menstrual period was 2022  Gardasil Vaccine Series: completed    Obstetric History    OB History    Para Term  AB Living   0 0 0 0 0 0   SAB IAB Ectopic Multiple Live Births   0 0 0 0 0       The following portions of the patient's history were reviewed and updated as appropriate: allergies, current medications, past family history, past medical history, past social history, past surgical history and problem list     Review of Systems    Pertinent items are noted in HPI  Objective    Physical Exam  Constitutional:       Appearance: Normal appearance  She is well-developed  Genitourinary:      Vulva, bladder and urethral meatus normal       No lesions in the vagina  Right Labia: No rash, tenderness, lesions, skin changes or Bartholin's cyst      Left Labia: No tenderness, lesions, skin changes, Bartholin's cyst or rash  No labial fusion noted  No inguinal adenopathy present in the right or left side  No vaginal discharge, erythema, tenderness, bleeding or granulation tissue  No vaginal prolapse present  No vaginal atrophy present  Right Adnexa: not tender, not full and no mass present  Left Adnexa: not tender, not full and no mass present  Cervix is nulliparous        No cervical motion tenderness, discharge, friability, lesion, polyp or nabothian cyst       Uterus is not enlarged, tender, irregular or prolapsed  No uterine mass detected  Uterus is anteverted  Pelvic exam was performed with patient in the lithotomy position  Breasts:     Breasts are symmetrical       Right: No inverted nipple, mass, nipple discharge, skin change or tenderness  Left: No inverted nipple, mass, nipple discharge, skin change or tenderness  HENT:      Head: Normocephalic and atraumatic  Neck:      Thyroid: No thyromegaly  Cardiovascular:      Rate and Rhythm: Normal rate and regular rhythm  Heart sounds: Normal heart sounds, S1 normal and S2 normal    Pulmonary:      Effort: Pulmonary effort is normal       Breath sounds: Normal breath sounds  Abdominal:      General: Bowel sounds are normal  There is no distension  Palpations: Abdomen is soft  There is no mass  Tenderness: There is no abdominal tenderness  There is no guarding  Hernia: There is no hernia in the left inguinal area or right inguinal area  Musculoskeletal:      Cervical back: Neck supple  Lymphadenopathy:      Cervical: No cervical adenopathy  Upper Body:      Right upper body: No supraclavicular or axillary adenopathy  Left upper body: No supraclavicular or axillary adenopathy  Lower Body: No right inguinal adenopathy  No left inguinal adenopathy  Neurological:      Mental Status: She is alert  Skin:     General: Skin is warm and dry  Findings: No rash  Psychiatric:         Attention and Perception: Attention and perception normal          Mood and Affect: Mood and affect normal          Speech: Speech normal          Behavior: Behavior is cooperative  Thought Content: Thought content normal          Cognition and Memory: Cognition and memory normal          Judgment: Judgment normal    Vitals and nursing note reviewed            Results from last 7 days   Lab Units 12/27/22  1335   LEUKOCYTES UA  neg   UROBILINOGEN UA neg   PROTEIN UA  trace   PH UA  neg   SPEC GRAV UA  1 015   KETONES UA  neg   BILIRUBIN UA POC  neg   GLUCOSE UA  neg   COLOR UA  yellow   CLARITY UA  clear       Assessment and Plan    Dez Bee was seen today for gynecologic exam and urinary tract infection  Diagnoses and all orders for this visit:    Women's annual routine gynecological examination  -     Liquid-based pap, screening    Dysuria  -     POCT urine dip    Encounter for surveillance of contraceptive pills  -     norgestimate-ethinyl estradiol (ORTHO TRI-CYCLEN LO) 0 18/0 215/0 25 MG-25 MCG per tablet; Take 1 tablet by mouth daily    Screening examination for STD (sexually transmitted disease)      Patient informed of a Stable GYN exam  POCT urine is negative  A pap smear was performed with STD screening  I have discussed the importance of exercise and healthy diet as well as adequate intake of calcium and vitamin D  The current ASCCP guidelines were reviewed  The low risk patient will receive pap smear screening every 3 years until the age of 34 and then every 3 to 5 years with HPV co-testing from the ages of 33-67  I emphasized the importance of an annual pelvic and breast exam  A yearly mammogram is recommended for breast cancer screening starting at age 36  Results will be released to [facility], if abnormal will call to review and discuss treatment plan  All questions have been answered to her satisfaction  Contraception: OCP (estrogen/progesterone)  Follow up in: 1 year or sooner if needed

## 2022-12-28 LAB
C TRACH DNA SPEC QL NAA+PROBE: NEGATIVE
N GONORRHOEA DNA SPEC QL NAA+PROBE: NEGATIVE

## 2022-12-30 LAB
LAB AP GYN PRIMARY INTERPRETATION: NORMAL
LAB AP LMP: NORMAL
Lab: NORMAL

## 2023-01-04 ENCOUNTER — TELEPHONE (OUTPATIENT)
Dept: INTERNAL MEDICINE CLINIC | Facility: CLINIC | Age: 23
End: 2023-01-04

## 2023-01-04 NOTE — TELEPHONE ENCOUNTER
I left a message on voice mail to call back to schedule an appointment in March with Dr Rodríguez Gomez

## 2023-01-04 NOTE — TELEPHONE ENCOUNTER
----- Message from Oniel Martinez MD sent at 1/4/2023 11:13 AM EST -----  Regarding: FW: scheduling  Good morning, could you please schedule this patient with me for a 120 minute evaluation on March 8 afternoon? Thank you    ----- Message -----  From: KETAN Valdivia  Sent: 12/16/2022  12:28 PM EST  To: Sugar Naidu MD, #  Subject: scheduling                                       I saw this adolescent today  She has been trying to establish with psychiatrist, therapist for some time  She is a college student  She has been struggling with depression and anxiety  She was started on Effexor and Wellbutrin by her previous PCP  She does not feel her moods are consistent  She admits to fleeting thoughts of death, suicide  Not suicidal during visit and advises that she would never act on it  She is able to contract for safety with friends, family      Are we able to see her in the next few months? I appreciate your help!     [facility]3] & [facility]

## 2023-01-06 ENCOUNTER — TELEPHONE (OUTPATIENT)
Dept: INTERNAL MEDICINE CLINIC | Facility: CLINIC | Age: 23
End: 2023-01-06

## 2023-01-06 NOTE — TELEPHONE ENCOUNTER
Called pt to schedule appt  I did schedule her for 03/08 at 2:30   Advised to call back to [facility]

## 2023-01-06 NOTE — TELEPHONE ENCOUNTER
----- Message from Aroldo Dow MD sent at 1/4/2023 11:13 AM EST -----  Regarding: FW: scheduling  Good morning, could you please schedule this patient with me for a 120 minute evaluation on March 8 afternoon? Thank you    ----- Message -----  From: KETAN West  Sent: 12/16/2022  12:28 PM EST  To: Chata Lane MD, #  Subject: scheduling                                       I saw this adolescent today  She has been trying to establish with psychiatrist, therapist for some time  She is a college student  She has been struggling with depression and anxiety  She was started on Effexor and Wellbutrin by her previous PCP  She does not feel her moods are consistent  She admits to fleeting thoughts of death, suicide  Not suicidal during visit and advises that she would never act on it  She is able to contract for safety with friends, family      Are we able to see her in the next few months? I appreciate your help!     Mustapha Frederick

## 2023-03-08 ENCOUNTER — TELEMEDICINE (OUTPATIENT)
Dept: BEHAVIORAL/MENTAL HEALTH CLINIC | Facility: CLINIC | Age: 23
End: 2023-03-08

## 2023-03-08 DIAGNOSIS — F41.9 ANXIETY: ICD-10-CM

## 2023-03-08 DIAGNOSIS — F33.2 SEVERE EPISODE OF RECURRENT MAJOR DEPRESSIVE DISORDER, WITHOUT PSYCHOTIC FEATURES (HCC): Primary | ICD-10-CM

## 2023-03-08 RX ORDER — VENLAFAXINE HYDROCHLORIDE 150 MG/1
150 CAPSULE, EXTENDED RELEASE ORAL DAILY
Qty: 30 CAPSULE | Refills: 0 | Status: SHIPPED | OUTPATIENT
Start: 2023-03-08 | End: 2023-04-07

## 2023-03-08 RX ORDER — BUPROPION HYDROCHLORIDE 150 MG/1
150 TABLET ORAL DAILY
Qty: 30 TABLET | Refills: 0 | Status: SHIPPED | OUTPATIENT
Start: 2023-03-08 | End: 2023-04-07

## 2023-03-08 NOTE — BH TREATMENT PLAN
TREATMENT PLAN (Medication Management Only)        [facility]    Name and Date of Birth:  Janell Ho 71 y o  2000  Date of Treatment Plan: March 8, 2023  Diagnosis/Diagnoses:    1  Severe episode of recurrent major depressive disorder, without psychotic features ([address] )    2  Anxiety      Strengths/Personal Resources for Self-Care: supportive family, taking medications as prescribed, ability to adapt to life changes, ability to communicate needs, ability to communicate well, ability to understand psychiatric illness, average or above intelligence, good physical health, good understanding of illness, independence, motivation for treatment, sense of humor, well educated  Area/Areas of need (in own words): depressive symptoms  1  Long Term Goal: continue improvement in depression  Target Date:6 months - 9/8/2023  Person/Persons responsible for completion of goal: Kelly Barr  2  Short Term Objective (s) - How will we reach this goal?:   A  Provider new recommended medication/dosage changes and/or continue medication(s): continue all other medications  B  Take walks regularly  C  Increase socialization with peers  Target Date:3 months - 6/8/2023  Person/Persons Responsible for Completion of Goal: Kelly Barr  Progress Towards Goals: continuing treatment  Treatment Modality: medication management every 4 weeks  Review due 180 days from date of this plan: 6 months - 9/8/2023  Expected length of service: ongoing treatment  My Physician/PA/NP and I have developed this plan together and I agree to work on the goals and objectives  I understand the treatment goals that were developed for my treatment

## 2023-03-08 NOTE — PSYCH
Virtual Visit Disclaimer & Required Documentation  TeleMed provider: Juan Euceda MD  and Dr Dolly Marrero, located in [place]  The patient is also located in [place], where I hold an active license  Wally Kingston understands that this is a telemedicine visit and that the visit is being conducted through [employer], which is a secure, [employer]  Patient is aware that [address] could be limited without vital signs or the ability to perform a full hands-on physical exam  My office door was closed  No one else was in the room  Patient is aware this is not a billable service  The patient agreed to participate and understands they can discontinue the visit at any time  I spent 57 minutes directly with the patient during this visit      [address] [place] [address]    Name and Date of Birth:  Wally Kingston 7 y o  2000 MRN: [id number]    Date of Visit: March 8, 2023    Reason for visit: Initial psychiatric intake assessment    Chief complaint: "I feel like my medication is not working anymore"    History of Present Illness (HPI):      Wally Kingston is a 25 y o , , adopted female, possessing a previous psychiatric history significant for depression, medically complicated by asthma, presenting to the [address] [place] [address] outpatient clinic for intake assessment referred by Dr Shauna Jorgensen (PCP)   Veto Elliott states she has worsening depression and some anxiety  Reports depression started about 7 years ago, however unclear, perhaps around the time of her parents divorce, some improvement with therapy and starting medications with PCP last year, however worsened significantly past 2-3 weeks with no specific triggers  Reports isolative behaviors "I don't want to go out or be around people," anhedonia, and passive death wishes with no plan or intention and is able to contract for safety   "I'm feeling sad again all the time and I'm getting irritable, started about 2-3 weeks ago "     Wellbutrin 75 mg 2 tabs qAM, for depression started last year byPCP  Effexor XR 75 mg, was never increased, started last year by PCP, patient is does not know why she was started on an SNRI, she said she believes it is due to side effects, this medication was never increased  Denies side effects to medications  Sexually active with another man who wants to be her partner, but she reports she is not interested in that, she only wants physical contact with him  Presently, patient denies suicidal/homicidal ideation, does endorse passive death wishes and denies thoughts of self-injury; contracts for safety, see below for risk assessment  At conclusion of evaluation, patient is amenable to the recommendations of this writer including: continuing psychotropic medications as prescribed  Also, patient is amenable to calling/contacting the outpatient office including this writer if any acute adverse effects of their medication regimen arise in addition to any comments or concerns pertaining to their psychiatric management  Patient is amenable to calling/contacting crisis and/or attending to the nearest emergency department if their clinical condition deteriorates to assure their safety and stability, stating that they are able to appropriately confide in their suicide hotline regarding their psychiatric state      Current Rating Scores:     Current PHQ-9   PHQ-2/9 Depression Screening    Little interest or pleasure in doing things: 3 - nearly every day  Feeling down, depressed, or hopeless: 3 - nearly every day  Trouble falling or staying asleep, or sleeping too much: 2 - more than half the days  Feeling tired or having little energy: 3 - nearly every day  Poor appetite or overeatin - not at all  Feeling bad about yourself - or that you are a failure or have let yourself or your family down: 2 - more than half the days  Trouble concentrating on things, such as reading the newspaper or watching television: 1 - several days  Moving or speaking so slowly that other people could have noticed  Or the opposite - being so fidgety or restless that you have been moving around a lot more than usual: 2 - more than half the days  Thoughts that you would be better off dead, or of hurting yourself in some way: 3 - nearly every day  PHQ-9 Score: 19   PHQ-9 Interpretation: Moderately severe depression        Current MARIANELA-7 is   MARIANELA-7 Flowsheet Screening    Flowsheet Row Most Recent Value   Over the last 2 weeks, how often have you been bothered by any of the following problems? Feeling nervous, anxious, or on edge 1   Not being able to stop or control worrying 1   Worrying too much about different things 2   Trouble relaxing 0   Being so restless that it is hard to sit still 1   Becoming easily annoyed or irritable 3   Feeling afraid as if something awful might happen 1   MARIANELA-7 Total Score 9            Psychiatric Review Of Systems:    Appetite: no  Adverse eating: some restrictive eating  Weight changes: no change  Insomnia/sleeplessness: increased  Fatigue/anergy: yes  Anhedonia/lack of interest: yes  Attention/concentration: some  Psychomotor agitation/retardation: yes, retardation  Somatic symptoms: no  Anxiety/panic attack: yes  Janae/hypomania: no  Hopelessness/helplessness/worthlessness: yes  Self-injurious behavior/high-risk behavior: no  Suicidal ideation: yes, passive death wish  Homicidal ideation: no  Auditory hallucinations: no  Visual hallucinations: no  Other perceptual disturbances: no  Delusional thinking: no  Obsessive/compulsive symptoms: no    Review Of Systems:    Constitutional negative   ENT negative   Cardiovascular negative   Respiratory negative   Gastrointestinal negative   Genitourinary negative   Musculoskeletal negative   Integumentary negative   Neurological negative   Endocrine negative   Other Symptoms none, all other systems are negative       Family Psychiatric History:     Family History   Adopted: Yes       Denies substance abuse or suicidality in immediate relations  Past Psychiatric History:     Previous inpatient psychiatric admissions: denies  Previous inpatient/outpatient substance abuse rehabilitation: denies  Present/previous outpatient psychiatric services: PCP, Dr Irasema Marshall  Present/previous psychotherapy services: Kenny Cristina, for parents divorce about 2 years (7 years)  History of suicidal attempts/gestures: denies  History of violence/aggressive behaviors:denies  Present/previous psychotropic medication use:   Denies    Substance Abuse History:    Patient denies history of alcohol, illict substance, or tobacco abuse  Patient denies previous legal actions or arrests related to substance intoxication including prior DWIs/DUIs  Brown does not apear under the influence or withdrawal of any psychoactive substance throughout today's examination  Social History:    Developmental: denies a history of milestone/developmental delay  Unknown history of in-utero exposure to toxins/illicit substances  There is no documented history of IEP or need for special education  Academic history: senior in [employer], [employer], studying Biology  Marital history: single  Social support system: mother, aunt and sometimes friends (but feels disconnected)  Living arrangement: mother  Vocational History:  at [employer] to firearms: denies direct access to weapons/firearms  Rizwana Rodgers has no history of arrests or violence pertaining to use of a deadly weapon       Traumatic History:     Abuse:denies  Other Traumatic Events: caught by parents talking to random men and consequence was traumatic    Past Medical History:    Past Medical History:   Diagnosis Date   • Asthma exacerbation, mild     last assessed - 06Oct2016   • Chronic tonsillitis    • Eczema    • Fracture of phalanx of right index finger    • Menorrhagia last assessed - [id number]   • Obstructive sleep apnea of child    • Orthostatic dizziness     last assessed - [id number]   • Premenarchal    • Seasonal allergies     Resolved - [id number]        Past Surgical History:   Procedure Laterality Date   • TONSILLECTOMY AND ADENOIDECTOMY       Allergies   Allergen Reactions   • Other      Other reaction(s): Asthma   • Pollen Extract Allergic Rhinitis and Wheezing       History Review: The following portions of the patient's history were reviewed and updated as appropriate: allergies, current medications, past family history, past medical history, past social history, past surgical history and problem list     OBJECTIVE:    Vital signs in last 24 hours: There were no vitals filed for this visit      Mental Status Evaluation:    Appearance age appropriate, casually dressed   Behavior cooperative, calm   Speech normal volume, normal pitch, slower   Mood depressed   Affect constricted   Thought Processes organized, goal directed   Associations intact associations   Thought Content no overt delusions   Perceptual Disturbances: no auditory hallucinations, no visual hallucinations   Abnormal Thoughts  Risk Potential Suicidal ideation - Yes, passive death wish  Homicidal ideation - None at present  Potential for aggression - Not at present   Orientation oriented to person, place and situation   Memory recent and remote memory grossly intact   Consciousness alert and awake   Attention Span Concentration Span attention span and concentration are age appropriate   Intellect appears to be of average intelligence   Insight intact   Judgement intact   Muscle Strength and  Gait unable to assess today due to virtual visit   Motor Activity unable to assess today due to virtual visit   Language no difficulty naming common objects, no difficulty repeating a phrase   Fund of Knowledge adequate knowledge of current events  adequate fund of knowledge regarding past history  adequate fund of knowledge regarding vocabulary    Pain none   Pain Scale 0       Laboratory Results: I have personally reviewed all pertinent laboratory/tests results    Recent Labs (last 6 months):   Office Visit on 12/27/2022   Component Date Value   • Case Report 12/27/2022                      Value:Gynecologic Cytology Report                       Case: Robert Umana Provider:  Orvin Sever, CRNP         Collected:           12/27/2022 1414              Ordering Location:     [facility]      Received:            12/27/2022 1414              First Screen:          [facility]                                                                Specimen:    LIQUID-BASED PAP, SCREENING, Cervix                                                       • Primary Interpretation 12/27/2022 Negative for intraepithelial lesion or malignancy    • Specimen Adequacy 12/27/2022 Satisfactory for evaluation  Endocervical/transformation zone component present  • Additional Information 12/27/2022                      Value: This result contains rich text formatting which cannot be displayed here     • LMP 12/27/2022 12/20/2022    • LEUKOCYTE ESTERASE,UA 12/27/2022 neg    • NITRITE,UA 12/27/2022 neg    • SL AMB POCT UROBILINOGEN 12/27/2022 neg    • POCT URINE PROTEIN 12/27/2022 trace    •  PH,UA 12/27/2022 neg    • BLOOD,UA 12/27/2022 trace    • SPECIFIC GRAVITY,UA 12/27/2022 1 015    • KETONES,UA 12/27/2022 neg    • BILIRUBIN,UA 12/27/2022 neg    • GLUCOSE, UA 12/27/2022 neg    •  COLOR,UA 12/27/2022 yellow    • CLARITY,UA 12/27/2022 clear    • N gonorrhoeae, DNA Probe 12/27/2022 Negative    • Chlamydia trachomatis, D* 12/27/2022 Negative        Suicide/Homicide Risk Assessment:    Risk of Harm to Self:  The following ratings are based on assessment at the time of the interview  Demographic risk factors include: age: young adult (15-24)  Historical Risk Factors include: chronic depressive symptoms, chronic anxiety symptoms  Recent Specific Risk Factors include: diagnosis of depression  Protective Factors: no current suicidal ideation  Based on today's assessment, Nataliia Poole presents the following risk of harm to self: low    Risk of Harm to Others: The following ratings are based on assessment at the time of the interview  Demographic Risk Factors include: [age] years of age  Historical Risk Factors include: none  Recent Specific Risk Factors include: none  Protective Factors: no current homicidal ideation  Weapons: none  The following steps have been taken to ensure weapons are properly secured: not applicable  Based on today's assessment, Nataliia Poole presents the following risk of harm to others: minimal    The following interventions are recommended: no intervention changes needed  Although patient's acute lethality risk is low, long-term/chronic lethality risk is mildly elevated in the presence of see above  At the current moment, Nataliia Poole is future-oriented, forward-thinking, and demonstrates ability to act in a self-preserving manner as evidenced by volitionally presenting to the clinic today, seeking treatment  At this juncture, inpatient hospitalization is not currently warranted  To mitigate future risk, patient should adhere to the recommendations of this writer, avoid alcohol/illicit substance use, utilize community-based resources and familiar support and prioritize mental health treatment  Based on today's assessment and clinical criteria, Fatmata De Leon contracts for safety and is not an imminent risk of harm to self or others  Outpatient level of care is deemed appropriate at this present time  Nataliia Poole understands that if they are no longer able to contract for safety, they need to call/contact the outpatient office including this writer, call/contact crisis and/orattend to the nearest Emergency Department for immediate evaluation      Assessment/Plan:   Fatmata De Leon is a 25 y o , , adopted female, possessing a previous psychiatric history significant for depression, medically complicated by asthma, [facility] for intake assessment referred by Dr Lissett Sommers (PCP)   Bentley Bosworth states she has worsening depression and some anxiety, past 2-3 weeks with passive death wishes (no intention or plan), anhedonia, increased sleep, increased isolation, psychomotor retardation  Patient is not currently in therapy but is agreeable to being placed on wait list    She is able to contract for safety  Denies HI/AVH  Medications were started by PCP, however were never increased, denies side effects to the medications  Patient is not interested in partial hospitalization at this time but is agreeable to calling this writer if depression continues to worsen prior to next appointment  DSM-5 Diagnoses:     • Major Depressive Disorder, recurrent, current moderate to severe  • Anxiety, unspecified       Treatment Recommendations/Precautions:    • Increase venlafaxine 150 mg 24h capsule for depression and anxiety, this is the medication PCP started, further optimize  • Change Wellbutrin 75 mg 2 tablet to Wellbutrin  mg daily for depression   • Placed on Virtual Psychotherapy waitlist  • Medication management every 2 weeks  • Aware of 24 hour and weekend coverage for urgent situations accessed by calling [facility] main practice number    Medications Risks/Benefits:      Risks, Benefits And Possible Side Effects Of Medications:    Risks, benefits, and possible side effects of medications explained to Bentley Bosworth and she verbalizes understanding and agreement for treatment   including: PARQ completed including serotonin syndrome, SIADH, worsened depression/suicidality, induction of mikael, blood pressure changes and GI distress, weight gain, sexual side effects, insomnia, sedation, potential for drug interactions, and others  PARQ completed including induction of mikael, decreased seizure threshold and risk with alcohol or electrolyte disturbances, headaches, hypertension and cardiovascular effects, GI distress, weight loss, agitation/activation, dizziness, tremor, anxiety, potential for drug interactions, and others  Controlled Medication Discussion:     No recent records found for controlled prescriptions according to [place] Prescription Drug Monitoring Program    Treatment Plan:    Completed and signed during the session: Yes - Treatment Plan done but not signed at time of office visit due to:  Plan reviewed in person and verbal consent given due to [address] distancing    This note was shared with patient      Visit Time    Visit Start Time: 2:30 PM  Visit Stop Time: 3:27 PM  Total Visit Duration: 57 minutes    Mira Min MD 03/08/23

## 2023-03-11 DIAGNOSIS — Z30.41 ENCOUNTER FOR SURVEILLANCE OF CONTRACEPTIVE PILLS: ICD-10-CM

## 2023-03-11 RX ORDER — NORGESTIMATE AND ETHINYL ESTRADIOL 7DAYSX3 LO
1 KIT ORAL DAILY
Qty: 84 TABLET | Refills: 0 | Status: CANCELLED | OUTPATIENT
Start: 2023-03-11

## 2023-03-13 NOTE — TELEPHONE ENCOUNTER
Lm pt's as re: has existing rfs on [facility] & can pt can req presc transfer to [facility]

## 2023-03-21 ENCOUNTER — TELEMEDICINE (OUTPATIENT)
Dept: PSYCHIATRY | Facility: CLINIC | Age: 23
End: 2023-03-21

## 2023-03-21 DIAGNOSIS — F33.1 MODERATE EPISODE OF RECURRENT MAJOR DEPRESSIVE DISORDER (HCC): Primary | ICD-10-CM

## 2023-03-21 PROBLEM — J02.9 SORE THROAT: Status: RESOLVED | Noted: 2020-01-29 | Resolved: 2023-03-21

## 2023-03-21 RX ORDER — VENLAFAXINE HYDROCHLORIDE 225 MG/1
225 TABLET, EXTENDED RELEASE ORAL
Qty: 30 TABLET | Refills: 0 | Status: SHIPPED | OUTPATIENT
Start: 2023-03-21 | End: 2023-04-20

## 2023-03-21 NOTE — PSYCH
Virtual Visit Disclaimer & Required Documentation  TeleMed provider: Bryce Siegel MD , located in [place]  The patient is also located in [place], where I hold an active license  Josh Yeboah understands that this is a telemedicine visit and that the visit is being conducted through [employer], which is a secure, [employer]  Patient is aware that [facility] could be limited without vital signs or the ability to perform a full hands-on physical exam  My office door was closed  No one else was in the room  Patient is aware this is a billable service  The patient agreed to participate and understands they can discontinue the visit at any time  I spent 20 minutes directly with the patient during this visit     [facility]      Name and Date of Birth:  Josh Yeboah 12 y o  2000 MRN: [id number]    Date of Visit: March 21, 2023    Reason for Visit: Follow-up visit regarding medication management     Chief Complaint:     SUBJECTIVE:    Josh Yeboah is a 25 y o , , adopted female, possessing a previous psychiatric history significant for depression, medically complicated by asthma, , who was personally seen and evaluated at the [address] [place] [address] E outpatient clinic for follow-up regarding medication management  Ivania Chiang states that since their previous outpatient psychiatric appointment with this writer, reports no change to mood since last appointment, current mood "I don't feel anything "    Reports continued longstanding (3+ months) of passive death wishes with no intention or plan, is able to contract for safety  Reports feeling decreased energy about 1 week ago, denies any GI symptoms  Otherwise, no change to psychiatric review of systems as seen below       Patient is not currently agreeable to partial hospitalization, however does state she is able to contact this writer if she feels the need to do so  Presently, patient denies suicidal/homicidal ideation in addition to thoughts of self-injury; contracts for safety, see below for risk assessment  At conclusion of evaluation, patient is amenable to the recommendations of this writer including: continuing psychotropic medications as prescribed  Also, patient is amenable to calling/contacting the outpatient office including this writer if any acute adverse effects of their medication regimen arise in addition to any comments or concerns pertaining to their psychiatric management  Patient is amenable to calling/contacting crisis and/or attending to the nearest emergency department if their clinical condition deteriorates to assure their safety and stability, stating that they are able to appropriately confide in this writer regarding their psychiatric state  Current Rating Scores:     Current PHQ-9   PHQ-2/9 Depression Screening    Little interest or pleasure in doing things: 3 - nearly every day  Feeling down, depressed, or hopeless: 2 - more than half the days  Trouble falling or staying asleep, or sleeping too much: 2 - more than half the days  Feeling tired or having little energy: 3 - nearly every day  Poor appetite or overeatin - not at all  Feeling bad about yourself - or that you are a failure or have let yourself or your family down: 3 - nearly every day  Trouble concentrating on things, such as reading the newspaper or watching television: 2 - more than half the days  Moving or speaking so slowly that other people could have noticed   Or the opposite - being so fidgety or restless that you have been moving around a lot more than usual: 1 - several days  Thoughts that you would be better off dead, or of hurting yourself in some way: 3 - nearly every day  PHQ-9 Score: 19   PHQ-9 Interpretation: Moderately severe depression          Psychiatric Review Of Systems:    Unchanged information from this writer's previous assessment is copied and italicized; information that has changed is bolded  Appetite: no  Adverse eating: some restrictive eating  Weight changes: no change  Insomnia/sleeplessness: increased  Fatigue/anergy: yes, increased  Anhedonia/lack of interest: yes  Attention/concentration: some  Psychomotor agitation/retardation: yes, retardation  Somatic symptoms: no  Anxiety/panic attack: yes  Janae/hypomania: no  Hopelessness/helplessness/worthlessness: yes  Self-injurious behavior/high-risk behavior: no  Suicidal ideation: yes, passive death wish  Homicidal ideation: no  Auditory hallucinations: no  Visual hallucinations: no  Other perceptual disturbances: no  Delusional thinking: no  Obsessive/compulsive symptoms: no    Review Of Systems:      Constitutional negative   ENT negative   Cardiovascular negative   Respiratory negative   Gastrointestinal negative   Genitourinary negative   Musculoskeletal negative   Integumentary negative   Neurological negative   Endocrine negative   Other Symptoms none, all other systems are negative     History Review: The following portions of the patient's history were reviewed and updated as appropriate: allergies, current medications, past family history, past medical history, past social history, past surgical history and problem list     Unchanged information from this writer's previous assessment is copied and italicized; information that has changed is bolded        Family Psychiatric History:      Family History[]Expand by Default   Family History   Adopted: Yes            Denies substance abuse or suicidality in immediate relations       Past Psychiatric History:      Previous inpatient psychiatric admissions: denies  Previous inpatient/outpatient substance abuse rehabilitation: denies  Present/previous outpatient psychiatric services: PCP, Dr Padmaja Roe  Present/previous psychotherapy services:  Alexandra Carson, for parents divorce about 2 years (7 years)  History of suicidal attempts/gestures: denies  History of violence/aggressive behaviors:denies  Present/previous psychotropic medication use:   Denies     Substance Abuse History:     Patient denies history of alcohol, illict substance, or tobacco abuse  Patient denies previous legal actions or arrests related to substance intoxication including prior DWIs/DUIs  Bean Robes does not apear under the influence or withdrawal of any psychoactive substance throughout today's examination       Social History:     Developmental: denies a history of milestone/developmental delay  Unknown history of in-utero exposure to toxins/illicit substances  There is no documented history of IEP or need for special education  Academic history: senior in [place], [employer], studying Biology  Marital history: single  Social support system: mother, aunt and sometimes friends (but feels disconnected)  Living arrangement: mother  Vocational History:  [facility] [employer] to [place]: denies direct access to weapons/firearms  Naya Arriaga has no history of arrests or violence pertaining to use of a deadly weapon       Traumatic History:      Abuse:denies  Other Traumatic Events: caught by parents talking to random men and consequence was traumatic            OBJECTIVE:     Vital signs in last 24 hours: There were no vitals filed for this visit      Mental Status Evaluation:    Appearance age appropriate, casually dressed   Behavior cooperative, calm   Speech normal rate, normal pitch, soft   Mood "I don't feel anything"   Affect constricted   Thought Processes organized, goal directed   Associations intact associations   Thought Content no overt delusions   Perceptual Disturbances: no auditory hallucinations, no visual hallucinations   Abnormal Thoughts  Risk Potential Suicidal ideation - passive death wishes with no intention or plan, able to contract for safety  Homicidal ideation - None at present  Potential for aggression - No   Orientation oriented to person, place and situation   Memory recent and remote memory grossly intact   Consciousness alert and awake   Attention Span Concentration Span attention span and concentration are age appropriate   Intellect appears to be of average intelligence   Insight fair   Judgement fair   Muscle Strength and  Gait unable to assess today due to virtual visit   Motor activity unable to assess today due to virtual visit   Language no difficulty naming common objects, no difficulty repeating a phrase   Fund of Knowledge adequate knowledge of current events  adequate fund of knowledge regarding past history  adequate fund of knowledge regarding vocabulary    Pain none   Pain Scale 0     Laboratory Results: I have personally reviewed all pertinent laboratory/tests results    Recent Labs (last 4 months):   Office Visit on 12/27/2022   Component Date Value   • Case Report 12/27/2022                      Value:Gynecologic Cytology Report                       Case: [id number]                                  Authorizing Provider:  KETAN Jacome         Collected:           12/27/2022 1414              Ordering Location:     [facility]      Received:            12/27/2022 1414              First Screen:          [facility]                                                                Specimen:    LIQUID-BASED PAP, SCREENING, Cervix                                                       • Primary Interpretation 12/27/2022 Negative for intraepithelial lesion or malignancy    • Specimen Adequacy 12/27/2022 Satisfactory for evaluation  Endocervical/transformation zone component present  • Additional Information 12/27/2022                      Value: This result contains rich text formatting which cannot be displayed here     • LMP 12/27/2022 12/20/2022    • LEUKOCYTE ESTERASE,UA 12/27/2022 neg    • NITRITE,UA 12/27/2022 neg    • SL AMB POCT UROBILINOGEN 12/27/2022 neg    • POCT URINE PROTEIN 12/27/2022 trace    •  PH,UA 12/27/2022 neg    • BLOOD,UA 12/27/2022 trace    • SPECIFIC GRAVITY,UA 12/27/2022 1 015    • KETONES,UA 12/27/2022 neg    • BILIRUBIN,UA 12/27/2022 neg    • GLUCOSE, UA 12/27/2022 neg    •  COLOR,UA 12/27/2022 yellow    • CLARITY,UA 12/27/2022 clear    • N gonorrhoeae, DNA Probe 12/27/2022 Negative    • Chlamydia trachomatis, D* 12/27/2022 Negative        Suicide/Homicide Risk Assessment:    Risk of Harm to Self:  The following ratings are based on assessment at the time of the interview  Demographic risk factors include: age: young adult (15-24)  Historical Risk Factors include: chronic depressive symptoms  Recent Specific Risk Factors include: diagnosis of depression, current depressive symptoms, passive death wishes  Protective Factors: access to mental health treatment, compliant with medications, compliant with mental health treatment  Weapons: none  The following steps have been taken to ensure weapons are properly secured: not applicable  Based on today's assessment, Alva Tommie presents the following risk of harm to self: low    Risk of Harm to Others: The following ratings are based on assessment at the time of the interview  Demographic Risk Factors include: [age] years of age  Historical Risk Factors include: none  Recent Specific Risk Factors include: none  Protective Factors: no current homicidal ideation  Weapons: none  The following steps have been taken to ensure weapons are properly secured: not applicable  Based on today's assessment, Alva Tommie presents the following risk of harm to others: minimal    The following interventions are recommended: no intervention changes needed  Although patient's acute lethality risk is low, long-term/chronic lethality risk is mildly elevated in the presence of see above   At the current moment, Alvajose juan Reilly is future-oriented, forward-thinking, and demonstrates ability to act in a self-preserving manner as evidenced by volitionally presenting to the clinic today, seeking treatment  To mitigate future risk, patient should adhere to the recommendations of this writer, avoid alcohol/illicit substance use, utilize community-based resources and familiar support and prioritize mental health treatment  Based on today's assessment and clinical criteria, Edna Zambrano contracts for safety and is not an imminent risk of harm to self or others  Outpatient level of care is deemed appropriate at this present time  Virginie Norman understands that if they are no longer able to contract for safety, they need to call/contact the outpatient office including this writer, call/contact crisis and/orattend to the nearest Emergency Department for immediate evaluation  Assessment/Plan:     Virginie Norman was virtually seen and evaluated today at the [address] [place] [address] [address] outpatient clinic  for follow-up regarding medication management  She reports no change to mood or anxiety since increase in Effexor, reports increased fatigue, denies side effects to medications  She is agreeable to increase in dosage to 225 mg daily  Denies HI/AVH  Reports 3+ months of passive death with wishes however no intention or plan  Denies access to firearms  Is able to contract for safety  Is not agreeable to partial hospitalization at this time  Patient was previously started on an SNRI by PCP, however is agreeable to switching to SSRI if this medication is not effective       DSM-5 Diagnoses:     • Major Depressive Disorder, recurrent, current episode moderate-severe    Treatment Recommendations/Precautions:    • Increase Venlafaxine T 24 hr to 225 mg daily with breakfast for mood  • Continue Wellbutrin  mg daily   • Medication management every 4 weeks  • Aware of 24 hour and weekend coverage for urgent situations accessed by calling [facility] main practice number  • Currently on the waitlist for virtual therapy    Medications Risks/Benefits      Risks, Benefits And Possible Side Effects Of Medications:    Risks, benefits, and possible side effects of medications explained to Kevin and she verbalizes understanding and agreement for treatment  including: PARQ completed including serotonin syndrome, SIADH, worsened depression/suicidality, induction of mikael, blood pressure changes and GI distress, weight gain, sexual side effects, insomnia, sedation, potential for drug interactions, and others  PARQ completed including induction of mikael, decreased seizure threshold and risk with alcohol or electrolyte disturbances, headaches, hypertension and cardiovascular effects, GI distress, weight loss, agitation/activation, dizziness, tremor, anxiety, potential for drug interactions, and others        Controlled Medication Discussion:     No recent records found for controlled prescriptions according to [facility] Prescription Drug Monitoring Program    Psychotherapy Provided:     Individual psychotherapy provided: No     Treatment Plan:    Completed and signed during the session: Not applicable - Treatment Plan not due at this session    This note was shared with patient      Visit Time    Visit Start Time: 8:12 AM  Visit Stop Time: 8:22 AM  Total Visit Duration: 20 minutes    Cruz Xie MD 03/21/23

## 2023-04-21 ENCOUNTER — TELEPHONE (OUTPATIENT)
Dept: PSYCHIATRY | Facility: CLINIC | Age: 23
End: 2023-04-21

## 2023-04-21 DIAGNOSIS — F33.1 MODERATE EPISODE OF RECURRENT MAJOR DEPRESSIVE DISORDER (HCC): ICD-10-CM

## 2023-04-21 RX ORDER — VENLAFAXINE HYDROCHLORIDE 225 MG/1
225 TABLET, EXTENDED RELEASE ORAL
Qty: 30 TABLET | Refills: 0 | Status: CANCELLED | OUTPATIENT
Start: 2023-04-21 | End: 2023-05-21

## 2023-04-21 NOTE — TELEPHONE ENCOUNTER
Solange Padron a call back to discuss her Effexor and the dosage  They can be reached at P#[phone]      Thank you

## 2023-04-21 NOTE — TELEPHONE ENCOUNTER
Patient's mother called to request a refill for the patient for Venlafaxine 150mg  The patient's mother stated that the patient wishes to go back to the 150mg tablets that were previously prescribed instead of the current 225  The patient's mother stated that the patient only has 1 tablet left of her current script

## 2023-04-24 ENCOUNTER — TELEPHONE (OUTPATIENT)
Dept: PSYCHIATRY | Facility: CLINIC | Age: 23
End: 2023-04-24

## 2023-04-24 DIAGNOSIS — F33.1 MODERATE EPISODE OF RECURRENT MAJOR DEPRESSIVE DISORDER (HCC): ICD-10-CM

## 2023-04-24 RX ORDER — VENLAFAXINE HYDROCHLORIDE 150 MG/1
150 TABLET, EXTENDED RELEASE ORAL
Qty: 30 TABLET | Refills: 0 | Status: SHIPPED | OUTPATIENT
Start: 2023-04-24 | End: 2023-05-24

## 2023-04-24 NOTE — TELEPHONE ENCOUNTER
Called and spoke to Kevin, she requested refill for venlafaxine  She had been taking 225 mg but she would like to know if that can be decreased to 150 mg  She also stated she is currently out of medication   Please send prescription to [facility]

## 2023-04-24 NOTE — TELEPHONE ENCOUNTER
Patient called regarding medication prescribed  Transferred call to nurse line for future assistance

## 2023-04-24 NOTE — TELEPHONE ENCOUNTER
Erica Quinonez  requested a call back to discuss her current medication prescribed  They can be reached at P# [phone]       Thank you

## 2023-04-26 ENCOUNTER — TELEPHONE (OUTPATIENT)
Dept: PSYCHIATRY | Facility: CLINIC | Age: 23
End: 2023-04-26

## 2023-05-09 ENCOUNTER — TELEPHONE (OUTPATIENT)
Dept: PSYCHIATRY | Facility: CLINIC | Age: 23
End: 2023-05-09

## 2023-05-09 DIAGNOSIS — F33.2 SEVERE EPISODE OF RECURRENT MAJOR DEPRESSIVE DISORDER, WITHOUT PSYCHOTIC FEATURES (HCC): ICD-10-CM

## 2023-05-09 RX ORDER — BUPROPION HYDROCHLORIDE 150 MG/1
150 TABLET ORAL DAILY
Qty: 60 TABLET | Refills: 0 | Status: SHIPPED | OUTPATIENT
Start: 2023-05-09 | End: 2023-05-17 | Stop reason: SDUPTHER

## 2023-05-17 ENCOUNTER — OFFICE VISIT (OUTPATIENT)
Dept: INTERNAL MEDICINE CLINIC | Facility: CLINIC | Age: 23
End: 2023-05-17

## 2023-05-17 VITALS
HEIGHT: 62 IN | WEIGHT: 127 LBS | OXYGEN SATURATION: 98 % | HEART RATE: 103 BPM | BODY MASS INDEX: 23.37 KG/M2 | DIASTOLIC BLOOD PRESSURE: 88 MMHG | SYSTOLIC BLOOD PRESSURE: 118 MMHG

## 2023-05-17 DIAGNOSIS — F32.A ANXIETY AND DEPRESSION: ICD-10-CM

## 2023-05-17 DIAGNOSIS — F41.9 ANXIETY AND DEPRESSION: ICD-10-CM

## 2023-05-17 DIAGNOSIS — R79.89 ABNORMAL TSH: ICD-10-CM

## 2023-05-17 DIAGNOSIS — J45.20 MILD INTERMITTENT ASTHMA WITHOUT COMPLICATION: Primary | ICD-10-CM

## 2023-05-17 PROBLEM — J45.901 MILD ASTHMA WITH ACUTE EXACERBATION: Status: RESOLVED | Noted: 2019-02-08 | Resolved: 2023-05-17

## 2023-05-17 PROBLEM — R68.89 FLU-LIKE SYMPTOMS: Status: RESOLVED | Noted: 2020-01-29 | Resolved: 2023-05-17

## 2023-05-17 PROBLEM — B35.4 TINEA CORPORIS: Status: RESOLVED | Noted: 2020-05-15 | Resolved: 2023-05-17

## 2023-05-17 RX ORDER — BUPROPION HYDROCHLORIDE 150 MG/1
150 TABLET ORAL DAILY
Qty: 90 TABLET | Refills: 0 | Status: SHIPPED | OUTPATIENT
Start: 2023-05-17 | End: 2023-08-15

## 2023-05-17 RX ORDER — VENLAFAXINE HYDROCHLORIDE 150 MG/1
150 TABLET, EXTENDED RELEASE ORAL
Qty: 90 TABLET | Refills: 0 | Status: SHIPPED | OUTPATIENT
Start: 2023-05-17 | End: 2023-08-15

## 2023-05-17 NOTE — ASSESSMENT & PLAN NOTE
- Mood stable  - Followed by psychiatry  Currently on venlafaxine and Wellbutrin  - Message sent to Yifan Herron to assist in patient getting in to see a therapist   She reports she has been on the waiting list for well over a year

## 2023-05-17 NOTE — PROGRESS NOTES
Name: Coleridge Habermann      : 2000      MRN: [id number]  Encounter Provider: Lauren Cohen MD  Encounter Date: 2023   [facility] [address] [place], [place] [address]     1  Mild intermittent asthma without complication  Assessment & Plan:  - Asthma well-controlled  - Xopenex inhaler as needed      2  Abnormal TSH  Assessment & Plan:  - History of abnormal TSH level  - Repeat TSH since last level checked in     Orders:  -     TSH, 3rd generation with Free T4 reflex; Future  -     Comprehensive metabolic panel; Future  -     CBC and differential; Future    3  Anxiety and depression  Assessment & Plan:  - Mood stable  - Followed by psychiatry  Currently on venlafaxine and Wellbutrin  - Message sent to Leti Mohan to assist in patient getting in to see a therapist   She reports she has been on the waiting list for well over a year  Orders:  -     buPROPion (Wellbutrin XL) 150 mg 24 hr tablet; Take 1 tablet (150 mg total) by mouth daily  -     venlafaxine 150 MG TB24 24 hr tablet; Take 1 tablet (150 mg total) by mouth daily with breakfast           Subjective      HPI  Patient presents today accompanied by her mother for follow-up  Her history is most notable for anxiety and depression  She is currently on Wellbutrin and venlafaxine  She was last seen by psychiatry in March  The patient and her mother state they have been on the waiting list to get into see a therapist for over a year  She also has a history of asthma  Patient reports this has been well controlled  She rarely requires the use of her Xopenex inhaler  Denies any shortness of breath or wheezing      ROS as per HPI    Current Outpatient Medications on File Prior to Visit   Medication Sig   • levalbuterol (XOPENEX HFA) 45 mcg/act inhaler Inhale 1-2 puffs every 4 (four) hours as needed for wheezing   • levalbuterol (Xopenex) 0 63 mg/3 mL nebulizer solution Take 3 mL (0 63 mg total) by "nebulization every 8 (eight) hours as needed for wheezing or shortness of breath   • norgestimate-ethinyl estradiol (ORTHO TRI-CYCLEN LO) 0 18/0 215/0 25 MG-25 MCG per tablet Take 1 tablet by mouth daily   • [DISCONTINUED] buPROPion (Wellbutrin XL) 150 mg 24 hr tablet Take 1 tablet (150 mg total) by mouth daily   • [DISCONTINUED] venlafaxine 150 MG TB24 24 hr tablet Take 1 tablet (150 mg total) by mouth daily with breakfast       Objective     /88   Pulse 103   Ht 5' 2\" (1 575 m)   Wt 57 6 kg (127 lb)   SpO2 98%   BMI 23 23 kg/m²     BP Readings from Last 3 Encounters:   05/17/23 118/88   12/27/22 110/74   12/16/22 112/72        Wt Readings from Last 3 Encounters:   05/17/23 57 6 kg (127 lb)   12/27/22 59 9 kg (132 lb)   12/16/22 59 kg (130 lb)       Physical Exam    General: NAD, Alert and oriented x3   HEENT: NCAT, EOMI, normal conjunctiva  Cardiovascular: RRR, normal S1 and S2, no m/r/g  Pulmonary: Normal respiratory effort, no wheezes, rales or rhonchi  GI: Soft, nontender, nondistended, normoactive bowel sounds  MSK: Normal bulk and tone  Neuro: Non-focal, ambulating without difficulty, non-antalgic gait  Extremities: No lower extremity edema  Skin: Normal skin color, no rashes     Keny Rodriguez MD  "

## 2023-05-17 NOTE — Clinical Note
Patient and her mother states they have been on the wait list with [address] to get in with a therapist for over a year  Can you assist in helping them get something scheduled?   Dr Vargas Cox

## 2023-07-03 ENCOUNTER — TELEPHONE (OUTPATIENT)
Dept: INTERNAL MEDICINE CLINIC | Facility: CLINIC | Age: 23
End: 2023-07-03

## 2023-07-03 NOTE — TELEPHONE ENCOUNTER
----- Message from Marylubna Aponte sent at 7/3/2023  1:27 PM EDT -----  Regarding: FW: Effexor & Wellbutrin  Contact: [phone]    ----- Message -----  From: Yary Ferny  Sent: 7/3/2023  12:54 PM EDT  To: [facility]  Subject: Effexor & Wellbutrin                             I don’t see her anymore. Pretty much I couldn’t contact her anymore and I only had two virtual appointments with her anyway. Both times I couldn’t see her because her camera wasn’t working and she said that she would increase my medicine by 100g after our second meeting.

## 2023-07-03 NOTE — TELEPHONE ENCOUNTER
Patient's last appointment with psychiatry was at the end of March. Given the severity of her depression I feel it would be in her best interest to continue following with psychiatry. Recommend she contact their office to either reschedule with her previous provider or to select a new provider.

## 2023-07-07 NOTE — TELEPHONE ENCOUNTER
I spoke with the patient, she indicated the telephone number for her psychiatrist at [facility] does not work.   I gave the patient the general number for [facility]

## 2023-07-17 ENCOUNTER — TELEPHONE (OUTPATIENT)
Dept: PSYCHIATRY | Facility: CLINIC | Age: 23
End: 2023-07-17

## 2023-07-17 NOTE — TELEPHONE ENCOUNTER
Patient was calling to schedule a f/u appt, writer transferred caller to resident line for assistance

## 2023-07-17 NOTE — TELEPHONE ENCOUNTER
Patient called and asked she could have a appt but after I set it up she should be seen at [facility] PCP office. Can you please call the patient and set something up with Dr Ezekiel Martinez.

## 2023-07-24 ENCOUNTER — TELEPHONE (OUTPATIENT)
Dept: INTERNAL MEDICINE CLINIC | Facility: CLINIC | Age: 23
End: 2023-07-24

## 2023-07-24 NOTE — TELEPHONE ENCOUNTER
The patient was last seen by Dr. Hollie Addison in psychiatry. She reports that she is no longer being seen by her however her last appointment with her was in March. Recommend she continue to go through this practice for med refills and management of her depression.

## 2023-07-24 NOTE — TELEPHONE ENCOUNTER
----- Message from Christi Pacheco sent at 7/24/2023  7:43 AM EDT -----  Regarding: FW: Prescription   Contact: [phone]    ----- Message -----  From: Erick Sexton  Sent: 7/22/2023   8:31 PM EDT  To: [facility]  Subject: Prescription                                     Hi Dr. John Chen,  What was the name of the psychiatrist that you recommended to me again? I need a refill for my meds.

## 2023-07-24 NOTE — TELEPHONE ENCOUNTER
Dr. Hollie Addison was our psych resident who has since graduated, did you want her to follow with Dr. Tao Wilson?

## 2023-07-25 DIAGNOSIS — F32.A ANXIETY AND DEPRESSION: ICD-10-CM

## 2023-07-25 DIAGNOSIS — F41.9 ANXIETY AND DEPRESSION: ICD-10-CM

## 2023-07-25 RX ORDER — VENLAFAXINE HYDROCHLORIDE 150 MG/1
150 TABLET, EXTENDED RELEASE ORAL
Qty: 90 TABLET | Refills: 0 | Status: SHIPPED | OUTPATIENT
Start: 2023-07-25 | End: 2023-10-23

## 2023-07-25 RX ORDER — BUPROPION HYDROCHLORIDE 150 MG/1
150 TABLET ORAL DAILY
Qty: 90 TABLET | Refills: 0 | Status: SHIPPED | OUTPATIENT
Start: 2023-07-25 | End: 2023-09-06

## 2023-07-25 NOTE — TELEPHONE ENCOUNTER
Notify patient her meds have been refilled. I will send a message to Dr. Darryl Smith to see if the patient can re-establish with her to continue management of her anxiety/depression.

## 2023-08-23 ENCOUNTER — TELEPHONE (OUTPATIENT)
Dept: PSYCHIATRY | Facility: CLINIC | Age: 23
End: 2023-08-23

## 2023-08-23 NOTE — TELEPHONE ENCOUNTER
Contacted pt in regards to scheduling an appointment for talk therapy services. LVM to contact the intake dept.

## 2023-09-05 NOTE — BH TREATMENT PLAN
TREATMENT PLAN (Medication Management Only)        [address] [facility]    Name and Date of Birth:  Laura Pryor 19 y.o. 2000  Date of Treatment Plan: September 6, 2023  Diagnosis/Diagnoses:    1. Current moderate episode of major depressive disorder without prior episode ([address])    2. Anxiety disorder, unspecified type      Strengths/Personal Resources for Self-Care: supportive family, taking medications as prescribed, ability to listen, ability to reason, ability to understand psychiatric illness, average or above intelligence, well educated, work skills. Area/Areas of need (in own words): anxiety symptoms, depressive symptoms  1. Long Term Goal: improve control of depression. Target Date:6 months - 3/6/2024  Person/Persons responsible for completion of goal: Lety/self  2. Short Term Objective (s) - How will we reach this goal?:   A. Provider new recommended medication/dosage changes and/or continue medication(s): continue current medications as prescribed Effexor, Wellbutrin. B. Attend medication management appointments regularly. C. Attend psychotherapy regularly and call to establish first appointment. Target Date:6 months - 3/6/2024  Person/Persons Responsible for Completion of Goal: Lety/self  Progress Towards Goals: continuing treatment, progressing  Treatment Modality: medication management every 4 weeks, call to establish psychotherapy care  Review due 180 days from date of this plan: 6 months - 3/6/2024  Expected length of service: ongoing treatment     My Physician and I have developed this plan together and I agree to work on the goals and objectives. I understand the treatment goals that were developed for my treatment.     Kevan Bronson DO  Psychiatry Resident, PGY-III

## 2023-09-05 NOTE — PSYCH
Psychiatric Evaluation - Behavioral Health   MRN: [id number]    Assessment/Plan   Mindi Mead is a 26 y.o. female, Single and presently living with her mother and pets, employed as Medical Technologist, with past medical history significant for asthma, eczema, with past psychiatric history significant for anxiety and depression, with suicide risk factors including limited social support, depressive symptoms, anxiety and age (15-24) with no prior psychiatric admissions, and no prior suicide attempts or gestures. Mindi Mead is presenting today for transfer of care evaluation after following with Dr. Rusty Munroe through [address]. Patient is currently on the waitlist for psychiatric services and psychotherapy through [facility] offices. Patient continues to have depressive symptoms, including low motivation, low energy, feelings of hopelessness, anhedonia, and passive death wishes. Patient denies active suicidal ideation and endorses protective factor of her mother. Patient presents with her mother today and requests she is present for duration of evaluation. Discussed adjustments to medications including increasing Wellbutrin as patient endorses medication helped and when she had trialed off of medication, she was more irritable, depressed, and anxious. Discussed risk/benefits of Wellbutrin and SNRI medication with patient and her mother and patient agreeable to increase of Wellbutrin XL to 300 mg daily and continuation of Effexor  mg daily. Patient agreeable to calling [facility] to establish psychotherapy appointment. 1. Major depressive disorder, single episode, current episode moderate, differential includes Adjustment disorder  2.  Anxiety disorder, unspecified, differential includes Generalized anxiety disorder, Social anxiety disorder    Treatment Recommendations/Precautions:  • Increase Wellbutrin XL to 300 mg daily for mood and anxiety and augmentation of SSRI   o Discussed at length with patient and her mother risk/benefits of increasing Wellbutrin XL to 300 mg and continuing Effexor XR at 225 mg vs decreasing Effexor XR. Reviewed adverse effects and possible increase in serotonergic and noradrenergic response. At this time, benefit of optimizing both medications outweighs risk given patient's persistent depressive symptoms and intermittent passive death wishes. Patient and mother educated to side effects to monitor for. - Blood pressure was taken at today's evaluation and was 108/63 mmHg   PARQ completed including induction of mikael, decreased seizure threshold and risk with alcohol or electrolyte disturbances, headaches, hypertension and cardiovascular effects, GI distress, weight loss, agitation/activation, dizziness, tremor, anxiety, potential for drug interactions, and others. • Continue Effexor  mg daily for mood and anxiety  o PARQ completed including serotonin syndrome, SIADH, worsened depression/suicidality, induction of mikael, blood pressure changes and GI distress, weight gain, sexual side effects, insomnia, sedation, potential for drug interactions, and others and additional conversation regarding Effexor and Wellbutrin combination therapy has as noted above. • Patient with CBC, CMP, and TSH pending. • Medication management follow-up in 4 weeks  • Aware of need to follow up with family physician for medical issues  • Aware of 24 hour and weekend coverage for urgent situations accessed by calling [facility] main practice number  • Referral for individual psychotherapy was previously completed and patient was contacted by [facility] to schedule appointment on 08/23/23. Patient encouraged to call and make appointment and is agreeable.     Medications Risks/Benefits:      Risks, Benefits And Possible Side Effects Of Medications:    Risks, benefits, and possible side effects of medications explained to [facility] and she verbalizes understanding and agreement for treatment. Controlled Medication Discussion:     Not applicable - controlled prescriptions are not prescribed by this practice    Treatment Plan:  The Treatment Plan was completed but not signed due to social distancing. Verbal consent was provided by the patient/caregiver during the visit. If done today, the next plan will be due March 6th, 2024.  ------------------------------------------------------------  Chief Complaint: "I have no interest in anything."    History of Present Illness     Anibal Montes is a 26 y.o. female, Single and presently living with her mother and pets, employed as Medical Technologist, with past medical history significant for asthma, eczema, with past psychiatric history significant for anxiety and depression, with suicide risk factors including limited social support, depressive symptoms, anxiety and age (15-24) with no prior psychiatric admissions, and no prior suicide attempts or gestures. Anibal Montes is presenting today for transfer of care evaluation after following with Dr. Eron Kerr through [address] [place] [address] which includes a full psychiatric assessment and evaluation for medication adjustments and psychotherapy. Patient is currently on the waitlist for psychiatric services and psychotherapy through [address] [place] [address]. Patient presents today with her mother and requests mother is present for duration of evaluation. Franchesca Trevino reports depression causes her to have "no interest in anything," "I don't want to go out or talk to anyone," "don't find anything fun," and "life feels meaningless." Patient reports she began to fill this year around 17years old which worsened during the pandemic. Patient endorses sleeping 10 hours per day - sleeping from 3:00pm-5:00pm and 11:00pm-5:00am. Patient endorses a "regular," appetite.   Patient endorses feelings of guilt due to not feeling productive. Patient denies feelings of worthlessness, she endorses feelings of hopelessness. Patient endorses decreased energy and decreased motivation. Patient endorses passive death wishes and passive suicidal ideation, patient reports if her mother were to pass away she would "maybe," end her life and identifies mother as protective factor. Patient denies homicidal ideation. Patient endorses these thoughts started in middle school. Her mother endorses these thoughts started when she was forced to go to counseling with her father after their relationship became estranged. Mother states patient would make statements like "the world would be better without me." The patient states her father "chose his wife over me," and mother has had sole custody of patient since 1st grade. Patient has low self esteem and endorses not liking herself. She and her mother report she has never seen herself positively. Patient endorses anxiety symptoms related to social encounters, stating "its hard to talk to people." Patient reports a feeling of inner tension and feels "I should be doing something." Patient has excessive worry about "the economy," "how much gas I use," because she is worried about her and her mother's finance and how it effects the world and worries about climate change. She reports spending around 1 hour per day digesting media around these topics and spends time ruminating about them. Patient endorses a history of disordered eating history, she reports only want to eat "healthy stuff," and she would count calories and restrict, she would become angry if foods were "unhealthy," which started in middle school and has improved. Patient reports she ate this way "because I was bigger than all of my friends," and she was diagnosed as obese in school. Patient denies history of binging, purging, or compensatory behaviors. Patient and mother deny texture/consistency or other aspects of food causing restriction. Patient denies history of symptoms related to obsessive-compulsive disorder. Patient does not endorse trauma-related symptoms of flashbacks, nightmares, avoidance, of hypervigilance. The patient denies history of or current manic related symptoms including decreased need for sleep, increased energy and mood, irritability, distractibility, increased goal directed behaviors, rapid speech or thoughts, grandiosity, and impulsivity. Patient endorses history of voices which were self deprecating and self derogatory, occurring a couple of years ago during depressed mood. Patient denies current or history of visual hallucinations. Patient denies history of or demonstrate symptoms consistent with negative symptoms of psychosis. Patient discusses she has 2 friends, that are "on very different tracks of life," and they often "go to bars and get drunk," and do not "care about spending money." The patient feels she is drifting from them and wishes she felt motivated to make new friends. Patient endorses loneliness. Patient endorses she wanted to continue with schooling and to have a job in environmental sciences. Patient said "no one would reach back out to her application," and she has stopped applying or researching these options. Patient recognizes depressed mood and low motivation have influenced her job search.  --------------------------------------  Psychiatric Review Of Systems:  • Sis Thomson reports Symptoms as described in HPI. • Sis Thomson denies Current suicidal thoughts, plan, or intent, Current thoughts of self-harm or Current homicidal thoughts, plan, or intent. Medical Review Of Systems:  Complete review of systems is negative except as noted above.     Visit Vitals  OB Status Unknown   Smoking Status Never      Wt Readings from Last 6 Encounters:   05/17/23 57.6 kg (127 lb)   12/27/22 59.9 kg (132 lb)   12/16/22 59 kg (130 lb)   09/06/22 57.6 kg (127 lb)   03/30/22 58.5 kg (129 lb)   03/28/22 59.7 kg (131 lb 9.6 oz)          Mental Status Evaluation:    Appearance age appropriate, casually dressed, dressed appropriately, looks stated age, wearing glasses   Behavior cooperative, intermittent eye contact, frequently turns to mother for support   Speech normal rate, normal volume, normal pitch, clear, coherent   Mood depressed   Affect constricted, depressed, tearful   Thought Processes organized, logical, coherent, increased rate of thoughts, negative thinking   Associations intact associations   Thought Content no overt delusions, negative thoughts, intrusive thoughts, ruminating thoughts   Perceptual Disturbances: Denies auditory or visual hallucinations and Does not appear to be responding to internal stimuli   Abnormal Thoughts  Risk Potential Denies suicidal or homicidal ideation, plan, or intent   Orientation oriented to person, place, time/date and situation   Memory recent and remote memory grossly intact   Consciousness alert and awake   Attention Span Concentration Span attention span and concentration are age appropriate   Intellect appears to be of average intelligence   Insight intact   Judgement intact   Muscle Strength and  Gait normal muscle strength and normal muscle tone, normal gait and normal balance   Motor Activity no abnormal movements   Language appears grossly intact   Fund of Knowledge appears within normal limits     Psychiatric History:   Prior psychiatric diagnoses: Depression and anxiety  Inpatient hospitalizations: patient denies  Suicide attempts/self-harm: patient denies  Violent/aggressive behavior: patient denies  Outpatient psychiatric providers: [facility], Dr. Esperanza Gómez, and prior to PCP, Dr. Joana Gan  Past/current psychotherapy: on waitlist for [facility] psychotherapy  Previously saw counselor, Darvin Cordero, as an adolescent for 2 years including group sessions and sessions with father  Other Services: patient denies  Psychiatric medication trial:   • Antidepressants: Effexor, Wellbutrin    Substance Abuse History:  I have assessed this patient for substance use within the past 12 months. Patient reports no current substance use. Patient denies history of cocaine, opioids, methamphetamines, or marijuana use. Patient denies inpatient or outpatient rehabilitation services. Denies history of alcohol, illict substance, or tobacco abuse. Denies past legal actions or arrests secondary to substance intoxication. The patient denies prior DWIs/DUIs. Rock Whitt does not exhibit objective evidence of substance withdrawal during today's examination nor does Rock Whitt appear under the influence of any psychoactive substance. Family Psychiatric History:   Patient is adopted and family history is unknown. Social History  Early life/developmental: Denies a history of milestone/developmental delay. Denies a history of in-utero exposure to toxins/illicit substances. Patient and mother deny history of IEP/504 plans, denies additional supports or special education. Patient was in AP and accelerated classes in high school. Marital history: Single   Children: no  Living arrangement: Lives in a home with mother, 1 dog, and 4 cats.   Support system: identifies mother and friends as the biggest source of support  Education: college graduate - biology  Occupational History: works as a medical technologist at [employer]  Other Pertinent History: denies legal issues, denies  history  Access to firearms: patient denies    Traumatic History:   Abuse: none reported  Other Traumatic Events: parent's divorce as adolescent (patient does not currently speak to her father), per chart review- identified being "caught" by parents talking to random men in childhood as traumatic    Meds/Allergies    Allergies   Allergen Reactions   • Other      Other reaction(s): Asthma   • Pollen Extract Allergic Rhinitis and Wheezing     Current Outpatient Medications   Medication Instructions   • buPROPion (WELLBUTRIN XL) 150 mg, Oral, Daily   • levalbuterol (XOPENEX HFA) 45 mcg/act inhaler 1-2 puffs, Inhalation, Every 4 hours PRN   • levalbuterol (XOPENEX) 0.63 mg, Nebulization, Every 8 hours PRN   • norgestimate-ethinyl estradiol (ORTHO TRI-CYCLEN LO) 0.18/0.215/0.25 MG-25 MCG per tablet 1 tablet, Oral, Daily   • venlafaxine 150 mg, Oral, Daily with breakfast      Past Medical History:   Diagnosis Date   • Asthma exacerbation, mild     last assessed - 06Oct2016   • Chronic tonsillitis    • Eczema    • Fracture of phalanx of right index finger    • Menorrhagia     last assessed - [id number]   • Mild asthma with acute exacerbation 2/8/2019   • Obstructive sleep apnea of child    • Orthostatic dizziness     last assessed - 20Nov2015   • Premenarchal    • Seasonal allergies     Resolved - [id number]   • Tinea corporis 5/15/2020      Past Surgical History:   Procedure Laterality Date   • TONSILLECTOMY AND ADENOIDECTOMY       Family History   Adopted: Yes   Family history unknown: Yes       The following portions of the patient's history were reviewed and updated as appropriate: allergies, current medications, past family history, past medical history, past social history, past surgical history and problem list.  Labs & Imaging:  I have personally reviewed all pertinent laboratory tests and imaging results. No visits with results within 6 Month(s) from this visit.    Latest known visit with results is:   Office Visit on 12/27/2022   Component Date Value Ref Range Status   • Case Report 12/27/2022    Final                    Value:Gynecologic Cytology Report                       Case: Jimena Reed Provider:  KETAN Lewis         Collected:           12/27/2022 1414              Ordering Location:     [facility]      Received:            12/27/2022 1414              First Screen:          [facility]                                                                Specimen: LIQUID-BASED PAP, SCREENING, Cervix                                                       • Primary Interpretation 12/27/2022 Negative for intraepithelial lesion or malignancy   Final   • Specimen Adequacy 12/27/2022 Satisfactory for evaluation. Endocervical/transformation zone component present. Final   • Additional Information 12/27/2022    Final                    Value: This result contains rich text formatting which cannot be displayed here.    • LMP 12/27/2022 12/20/2022   Final   • LEUKOCYTE ESTERASE,UA 12/27/2022 neg   Final   • NITRITE,UA 12/27/2022 neg   Final   • [facility] POCT UROBILINOGEN 12/27/2022 neg   Final   • POCT URINE PROTEIN 12/27/2022 trace   Final   •  PH,UA 12/27/2022 neg   Final   • BLOOD,UA 12/27/2022 trace   Final   • SPECIFIC GRAVITY,UA 12/27/2022 1.015   Final   • KETONES,UA 12/27/2022 neg   Final   • BILIRUBIN,UA 12/27/2022 neg   Final   • GLUCOSE, UA 12/27/2022 neg   Final   •  COLOR,UA 12/27/2022 yellow   Final   • CLARITY,UA 12/27/2022 clear   Final   • N gonorrhoeae, DNA Probe 12/27/2022 Negative  Negative Final   • Chlamydia trachomatis, DNA Probe 12/27/2022 Negative  Negative Final       Suicide/Homicide Risk Assessment:    Risk of Harm to Self:  The following ratings are based on assessment at the time of the interview  Demographic risk factors include: age: young adult (15-24)  Historical Risk Factors include: chronic depressive symptoms, chronic anxiety symptoms  Recent Specific Risk Factors include: diagnosis of mood disorder, current depressive symptoms, current anxiety symptoms, passive death wishes, feelings of guilt or self blame, hopelessness  Protective Factors: no current suicidal ideation, access to mental health treatment, compliant with medications, compliant with mental health treatment, no substance use problems, opportunities to participate in community, resiliency, responsibilities and duties to others, restricted access to lethal means, stable living environment, stable job, supportive family  Weapons: none. The following steps have been taken to ensure weapons are properly secured: not applicable  Based on today's assessment, Montserrat Avalos presents the following risk of harm to self: low    Risk of Harm to Others: The following ratings are based on assessment at the time of the interview  Demographic Risk Factors include: [age] years of age. Historical Risk Factors include: none. Recent Specific Risk Factors include: concomitant mood disorder, multiple stressors. Protective Factors: no current homicidal ideation, access to mental health treatment, compliant with medications, compliant with mental health treatment, no substance use problems, resilience, responsibilities and duties to others, restricted access to lethal means, safe and stable living environment, supportive family  Weapons: none. The following steps have been taken to ensure weapons are properly secured: not applicable  Based on today's assessment, Montserrat Avalos presents the following risk of harm to others: low      Medical Decision Making / Counseling / Coordination of Care: The following interventions are recommended: return in 1 month for follow up and refer for individual therapy. Although patient's acute lethality risk is LOW, long-term/chronic lethality risk is mildly elevated given the risk factors listed above. However, at the current moment, Montserrat Avalos is future-oriented, forward-thinking, and demonstrates ability to act in a self-preserving manner as evidenced by volitionally seeking psychiatric evaluation and treatment today. Nely Cary contracts for safety and is not an imminent risk of harm to self or others. Outpatient level of care is deemed appropriate at this current time and patient has support of mother at home who is present for evaluation and available as support for patient.  Montserrat Avalos understands that if they can no longer contract for safety, they need to call the office or report to their nearest Emergency Room for immediate evaluation. At this juncture, inpatient hospitalization is not currently warranted. To mitigate future risk, patient should adhere to treatment recommendations, avoid alcohol/illicit substance use, utilize community-based resources and familiar support, and prioritize mental health treatment. The diagnosis and treatment plan were reviewed with the patient. Risks, benefits, and alternatives to treatment were discussed. The importance of medication and treatment compliance was reviewed with the patient.     This note was not shared with the patient due to reasonable likelihood of causing patient harm     Wilbert Ross DO   Psychiatry Resident, PGY-III

## 2023-09-06 ENCOUNTER — OFFICE VISIT (OUTPATIENT)
Dept: BEHAVIORAL/MENTAL HEALTH CLINIC | Facility: CLINIC | Age: 23
End: 2023-09-06

## 2023-09-06 VITALS — DIASTOLIC BLOOD PRESSURE: 63 MMHG | SYSTOLIC BLOOD PRESSURE: 108 MMHG | HEART RATE: 96 BPM

## 2023-09-06 DIAGNOSIS — F32.1 CURRENT MODERATE EPISODE OF MAJOR DEPRESSIVE DISORDER WITHOUT PRIOR EPISODE (HCC): Primary | ICD-10-CM

## 2023-09-06 DIAGNOSIS — F41.9 ANXIETY DISORDER, UNSPECIFIED TYPE: ICD-10-CM

## 2023-09-06 RX ORDER — BUPROPION HYDROCHLORIDE 300 MG/1
300 TABLET ORAL EVERY MORNING
Qty: 30 TABLET | Refills: 1 | Status: SHIPPED | OUTPATIENT
Start: 2023-09-06 | End: 2023-11-05

## 2023-09-06 NOTE — PATIENT INSTRUCTIONS
Increase Wellbutrin XL to 300 mg daily  Continue Effexor  mg daily    Please call [facility] Psychiatry to make psychotherapy appointment  Please call the office nursing staff for medication issues including refills, problems obtaining medications, side effects, etc at [phone]. Please return for a follow up appointment as discussed. If you are running late or are unable to attend your appointment, please call our office. If you are in psychological crisis including not limited to suicidal/homicidal thoughts or thoughts of self-injury, do not hesitate to call/contact your [facility] hotline (see below), call 911, call [phone] (mental health crisis), or go to the nearest emergency department.   [facility] Crisis: [address] Crisis: [phone]  [address] Crisis: [address] Crisis: [address] Crisis: [address] Crisis: [address] Crisis: [phone]  National Suicide Prevention Hotline: 9-550.501.9371

## 2023-09-18 ENCOUNTER — TELEPHONE (OUTPATIENT)
Age: 23
End: 2023-09-18

## 2023-09-18 NOTE — TELEPHONE ENCOUNTER
Pt is calling to see if she has an appt with behavioral health on 10/4/23 with [facility]. I cannot open or see behavioral health. Pls call pt to advise.

## 2023-09-19 ENCOUNTER — TELEPHONE (OUTPATIENT)
Dept: PSYCHIATRY | Facility: CLINIC | Age: 23
End: 2023-09-19

## 2023-09-19 NOTE — TELEPHONE ENCOUNTER
JASMEETM for patient to call the [facility] office to r/s her 10/4 appt with Dr. Kalen Delgado at [address] [place] [address].

## 2023-09-19 NOTE — TELEPHONE ENCOUNTER
Marquez Can  requested a call back and left a voicemail  to discuss scheduling an appt. They can be reached at P# [phone]      Thank you.

## 2023-10-03 DIAGNOSIS — F32.1 CURRENT MODERATE EPISODE OF MAJOR DEPRESSIVE DISORDER WITHOUT PRIOR EPISODE (HCC): ICD-10-CM

## 2023-10-03 RX ORDER — BUPROPION HYDROCHLORIDE 300 MG/1
300 TABLET ORAL EVERY MORNING
Qty: 30 TABLET | Refills: 1 | OUTPATIENT
Start: 2023-10-03 | End: 2023-12-02

## 2023-10-06 ENCOUNTER — TELEPHONE (OUTPATIENT)
Dept: PSYCHIATRY | Facility: CLINIC | Age: 23
End: 2023-10-06

## 2023-10-06 DIAGNOSIS — F32.1 CURRENT MODERATE EPISODE OF MAJOR DEPRESSIVE DISORDER WITHOUT PRIOR EPISODE (HCC): ICD-10-CM

## 2023-10-06 RX ORDER — BUPROPION HYDROCHLORIDE 150 MG/1
TABLET ORAL
Qty: 10 TABLET | Refills: 0 | Status: SHIPPED | OUTPATIENT
Start: 2023-10-06

## 2023-10-06 NOTE — TELEPHONE ENCOUNTER
Provider received message patient has been experiencing potential adverse effects of medications. Called patient on 10/05/23, was unable to reach and left voicemail instructing to return call for additional guidance and to discuss medication adjustments and scheduling sooner follow up. Call received from patient and call returned on 10/06/23. Patient endorses feeling worse the past few days, on 300 mg on Wellbutrin XL since 09/10/23 and has continued Effexor  mg daily, patient did not increase Effexor XR to 225 mg as previously prescribed. For the past few days, patient endorses dizziness, nausea, with episode of emesis 2 days ago. Patient does not endorse confusion or headache. Patient endorses intermittent flushing feeling, though reports this occurred prior to other symptoms and she is unsure if it is related to medication increase. Patient endorses some decrease in appetite, and notes she has not been hydrating well. Discussed with patient importance of hydration and regular meals. Discussed symptoms of depression, patient notes worsening mood and decrease in self care. Patient denies active suicidal ideation with plan, does endorse continued "pointless," feeling to life which has been more persistent. Discussed safety/crisis plan with patient which includes calling suicide hotline, 911, or 988, or notifying her family or provider to worsening symptoms. Instructed patient to seek immediate medical attention and present to emergency room with worsening physical symptoms and/or worsening depressive symptoms including suicidal ideation/thoughts to harm self. Instructed patient to decrease Wellbutrin XL to 150 mg daily and continue Effexor  mg daily. · Confirmed with pharmacist 300 mg Wellbutrin XL cannot be halved and notified patient new prescription of 150 mg XL daily will be sent to preferred pharmacy.   Discussed importance of not stopping medications abruptly and need for decrease prior to stopping. Scheduled sooner follow up with patient on 10/11/23 at 10:30 am.    Reviewed phone numbers available to patient for contacting psychiatric provider, including direct clinical line ([phone]), and main line ([phone]). Patient is aware of 24/7 coverage through [facility] main line. Reviewed with patient importance of calling to notify of concerns/adverse effects given other communication lines may not be monitored regularly.

## 2023-10-09 NOTE — PSYCH
Psychiatric Follow Up Visit - [facility]  MRN: [id number]    Assessment/Plan:   Abdi Gu. Aidan Nation is a 26 y.o. female, Single and presently living with her mother and pets, employed as Medical Technologist, with past medical history significant for asthma, eczema, with past psychiatric history significant for anxiety and depression, with suicide risk factors including limited social support, depressive symptoms, anxiety and age (15-24) with no prior psychiatric admissions, and no prior suicide attempts or gestures. Bolivar Rivera is presenting today for follow up, patient was seen for transfer of care with this provider in 09/2023 and previously followed with Dr. Elis Fregoso through [facility] since 04/2023. In interim since transfer of care, patient required rescheduling of appointment to later date in October and provider received messages outside of EMR that patient was experiencing possible adverse effects of medication and there was family concern for worsening of symptoms. Provider contacted patient on 10/05/23 and re-attempted contact on 10/06/23 to review adverse effects and treatment plan. At that time, Wellbutrin XL was decreased to 150 mg daily, patient confirmed she had not previously increased Effexor XR as prescribed by prior provider and was on 150 mg daily. Discussed precautions for presenting to ER/calling provider/calling 911 and 988. Reviewed with patient importance of contacting provider through provided phone number as it is a monitored communication line. [facility] psychotherapy department also contacted and patient will be contacted for intake/scheduling appointment. On today's evaluation, patient endorses continued depressive symptoms including low motivation, low energy, feelings of hopelessness, anhedonia, and passive death wishes.  Patient endorses she minimized symptoms at prior appointment due to mother being present and not wanting to upset her mother. Patient denies active suicidal ideation and endorses protective factor of her mother again on today's evaluation. Discussed increasing level of care given patient's depressive symptoms and decreased level of functioning, discussed inpatient vs PHP vs IOP. Recommended PHP referral, patient hesitant to accept PHP due to work schedule and had questions regarding IOP program. Provider contacted [facility] to review options and they will be in contact with patient. Diagnoses:  Major depressive disorder, single episode, current episode severe  Anxiety disorder, unspecified, differential includes Generalized anxiety disorder, Social anxiety disorder    Treatment Recommendations/Precautions:  Referral to PHP followed by IOP program through [address]  Patient unsure if she can participate in 5 day/week PHP program due to work schedule  Discussed options with patient and referral placed with clinical coordinator for [facility]   Discussed options for increased level of outpatient care vs inpatient admission with patient - patient does not meet criteria for involuntary inpatient hospitalization at time of evaluation.   Crisis/safety plan reviewed and indications for calling crisis/911/988 and/or presenting to emergency room, including worsening self care/functioning, worsening depression, and suicidal ideation were reviewed with patient  Recommended patient to transfer to [address] [place] [address] office from [facility] clinic  Patient agreeable to plan and scheduled for follow up at [address] [place] [address] on 10/26/23  Continue Wellbutrin  mg daily for mood and anxiety and augmentation of SNRI  PARQ completed including induction of mikael, decreased seizure threshold and risk with alcohol or electrolyte disturbances, headaches, hypertension and cardiovascular effects, GI distress, weight loss, agitation/activation, dizziness, tremor, anxiety, potential for drug interactions, and others. Continue Effexor  mg daily for mood and anxiety  PARQ completed including serotonin syndrome, SIADH, worsened depression/suicidality, induction of mikael, blood pressure changes and GI distress, weight gain, sexual side effects, insomnia, sedation, potential for drug interactions, and others. Reviewed importance of contacting provider through [facility] resident clinic line/[facility] main line as communication lines are monitored and there is 24/7 coverage for urgent situations  Patient was unable to completed lab work that was previously ordered including CBC, CMP, and TSH  Vitamin D level ordered  Instructed and encouraged patient to have lab work completed. Medication management every 2 weeks  Aware of 24 hour and weekend coverage for urgent situations accessed by calling [facility] main practice number  . Medication Risks/Benefits      Risks, Benefits And Possible Side Effects Of Medications:    Risks, benefits, and possible side effects of medications explained to OSIEL and she verbalizes understanding and agreement for treatment. Controlled Medication Discussion:     Not applicable - controlled prescriptions are not prescribed by this practice  ------------------------------------  History of Present Illness   Juliet Soria is presenting to follow up for anxiety, depression, and decreased self-care . OSIEL notes she feels "more normal," since speaking with provider and decreasing Wellbutrin  mg daily and continuing Effexor  mg daily. Patient endorses continued low motivation and low energy. She endorses hypersomnia, recently sleeping until 11:30am and going to bed around 1:00am. Patient denies frequent awakenings of difficulty falling asleep. Patient notes decreased appetite after increase of Wellbutrin XL to 300 mg daily, patient reports appetite is back to baseline.    Patient endorses self care has been decreased, with showers 2-3 times per week, and teeth brushing every 2-3 days. Patient has persistent feelings of guilt, hopelessness, and feelings of "pointless," feeling to life. "I just don't see the point to living," patient endorses the thoughts started "at least 4 years ago," and have been persistent since. Patient endorses she did not disclose this information to provider previously as she did not want to upset her mother to the severity of symptoms. Patient has continued passive death wishes, passive suicidal ideation, denies active suicidal ideation and denies plan. The patient does not endorse current manic related symptoms including decreased need for sleep, increased energy and mood, irritability, distractibility, increased goal directed behaviors, rapid speech or thoughts, grandiosity, and impulsivity. Patient endorses continued loud monologue of her own voice saying self deprecating and self derogatory things. Patient denies this is another's or an unidentifiable voice and feels the thoughts are her own and in her own voice. Patient denies current auditory or visual hallucinations, paranoid ideations, and does not demonstrate symptoms consistent with negative symptoms of psychosis at time of evaluation. Discussed what would feeling "better," look like to patient and she notes "better hygiene, caring about myself." Provided support and worked to identify small, achievable goals for patient. Patient notes she has difficulty thinking of her future, and thought "I wouldn't be here by now," stating she did not want to end her life, but was hopeful something bad would happen to her. Discussed patient's long term desire to be off of medication.  Patient able to state medications have been helpful over the last few years, she notes she used to "cry everyday, yell at everyone," in days prior to menses as well as other times throughout the month, she feels it would be "ten times worse," without medications at this time and she is unsure why she feels resistant to increasing them or staying on them. Patient also states "I don't understand the point of someone trying to help someone not be depressed feels like they are pretending to care." Support provided and reviewed role of providers and additional supports for patient, including recommendation for PHP at this time. Psychiatric Review Of Systems:  Haydee Jordan reports Symptoms as described in HPI. Haydee Jordan denies Current suicidal thoughts, plan, or intent, Current thoughts of self-harm, or Current homicidal thoughts, plan, or intent. Medical Review Of Systems:  Complete review of systems is negative except as noted above.    ------------------------------------  All italicized information has been copied from previous psychiatric evaluation. Information has been reviewed with the patient. Past Psychiatric History:   Prior psychiatric diagnoses: Depression and anxiety  Inpatient hospitalizations: patient denies  Suicide attempts/self-harm: patient denies  Violent/aggressive behavior: patient denies  Outpatient psychiatric providers: [facility], Dr. Callie Schwab, and prior to PCP, Dr. Luz Yin  Past/current psychotherapy: on waitlist for [facility] psychotherapy  Previously saw counselor, Josephine Gamboa, as an adolescent for 2 years including group sessions and sessions with father  Other Services: patient denies  Psychiatric medication trial:   Antidepressants: Effexor, Wellbutrin     Substance Abuse History:  I have assessed this patient for substance use within the past 12 months. Patient reports no current substance use. Patient denies history of cocaine, opioids, methamphetamines, or marijuana use. Patient denies inpatient or outpatient rehabilitation services. Denies history of alcohol, illict substance, or tobacco abuse. Denies past legal actions or arrests secondary to substance intoxication. The patient denies prior DWIs/DUIs.  Haydee Jordan does not exhibit objective evidence of substance withdrawal during today's examination nor does Rock Whitt appear under the influence of any psychoactive substance. Family Psychiatric History:   Patient is adopted and family history is unknown. Social History  Early life/developmental: Denies a history of milestone/developmental delay. Denies a history of in-utero exposure to toxins/illicit substances. Patient and mother deny history of IEP/504 plans, denies additional supports or special education. Patient was in AP and accelerated classes in high school. Marital history: Single   Children: no  Living arrangement: Lives in a home with mother, 1 dog, and 4 cats. Support system: identifies mother and friends as the biggest source of support  Education: college graduate - biology  Occupational History: works as a medical technologist at [employer]  Other Pertinent History: denies legal issues, denies  history  Access to firearms: patient denies     Traumatic History:   Abuse: none reported  Other Traumatic Events: parent's divorce as adolescent (patient does not currently speak to her father), per chart review- identified being "caught" by parents talking to random men in childhood as traumatic    History Review:  The following portions of the patient's history were reviewed and updated as appropriate: allergies, current medications, past family history, past medical history, past social history, past surgical history, and problem list.    Visit Vitals  OB Status Unknown   Smoking Status Never      Wt Readings from Last 6 Encounters:   05/17/23 57.6 kg (127 lb)   12/27/22 59.9 kg (132 lb)   12/16/22 59 kg (130 lb)   09/06/22 57.6 kg (127 lb)   03/30/22 58.5 kg (129 lb)   03/28/22 59.7 kg (131 lb 9.6 oz)        Rating Scales  PHQ-2/9 Depression Screening                 Mental Status Exam:  Appearance:  alert, good eye contact, appears stated age, casually dressed, appropriate grooming and hygiene, and wearing glasses   Behavior:  calm, cooperative, and sitting comfortably   Motor: no abnormal movements and normal gait and balance   Speech:  spontaneous, clear, not pressured, soft, not hyperverbal, and coherent   Mood:  depressed   Affect:  constricted, depressed, and smiles at times   Thought Process:  organized, logical, linear, limited goal directed thinking   Thought Content: no verbalized delusions or overt paranoia, ruminating thoughts, negative thoughts   Perceptual disturbances: no reported hallucinations and does not appear to be responding to internal stimuli at this time   Risk Potential: No active suicidal ideation, No active homicidal ideation, Passive death wishes   Cognition: oriented to person, place, time, and situation, memory grossly intact, appears to be of average intelligence, and age-appropriate attention span and concentration   Insight:  Fair   Judgment: Fair       Meds/Allergies    Allergies   Allergen Reactions    Other      Other reaction(s): Asthma    Pollen Extract Allergic Rhinitis and Wheezing     Current Outpatient Medications   Medication Instructions    buPROPion (Wellbutrin XL) 150 mg 24 hr tablet Take 1 tablet (150 mg) daily    levalbuterol (XOPENEX HFA) 45 mcg/act inhaler 1-2 puffs, Inhalation, Every 4 hours PRN    levalbuterol (XOPENEX) 0.63 mg, Nebulization, Every 8 hours PRN    norgestimate-ethinyl estradiol (ORTHO TRI-CYCLEN LO) 0.18/0.215/0.25 MG-25 MCG per tablet 1 tablet, Oral, Daily    venlafaxine 150 mg, Oral, Daily with breakfast        Labs & Imaging:  I have personally reviewed all pertinent laboratory tests and imaging results. No visits with results within 2 Month(s) from this visit.    Latest known visit with results is:   Office Visit on 12/27/2022   Component Date Value Ref Range Status    Case Report 12/27/2022    Final                    Value:Gynecologic Cytology Report                       Case: [id number] Provider: KETAN Tay         Collected:           12/27/2022 1414              Ordering Location:     [facility]      Received:            12/27/2022 1414              First Screen:          [facility]                                                                Specimen:    LIQUID-BASED PAP, SCREENING, Cervix                                                        Primary Interpretation 12/27/2022 Negative for intraepithelial lesion or malignancy   Final    Specimen Adequacy 12/27/2022 Satisfactory for evaluation. Endocervical/transformation zone component present. Final    Additional Information 12/27/2022    Final                    Value: This result contains rich text formatting which cannot be displayed here. LMP 12/27/2022 12/20/2022   Final    LEUKOCYTE ESTERASE,UA 12/27/2022 neg   Final    NITRITE,UA 12/27/2022 neg   Final    SL AMB POCT UROBILINOGEN 12/27/2022 neg   Final    POCT URINE PROTEIN 12/27/2022 trace   Final     PH,UA 12/27/2022 neg   Final    BLOOD,UA 12/27/2022 trace   Final    SPECIFIC GRAVITY,UA 12/27/2022 1.015   Final    KETONES,UA 12/27/2022 neg   Final    BILIRUBIN,UA 12/27/2022 neg   Final    GLUCOSE, UA 12/27/2022 neg   Final     COLOR,UA 12/27/2022 yellow   Final    CLARITY,UA 12/27/2022 clear   Final    N gonorrhoeae, DNA Probe 12/27/2022 Negative  Negative Final    Chlamydia trachomatis, DNA Probe 12/27/2022 Negative  Negative Final     ---------------------------------------    Although patient's acute lethality risk is low, long-term/chronic lethality risk is mildly elevated in the presence of depression, anxiety, young age, passive death wishes, feelings of guilt and hopelessness. At the current moment, Janie Collins is future-oriented, forward-thinking, and demonstrates ability to act in a self-preserving manner as evidenced by volitionally presenting to the clinic today, seeking treatment. At this juncture, inpatient hospitalization is not currently warranted.  To mitigate future risk, patient should adhere to the recommendations of this writer, avoid alcohol/illicit substance use, utilize community-based resources and familiar support and prioritize mental health treatment. Based on today's assessment and clinical criteria, [facility] Guo contracts for safety and is not an imminent risk of harm to self or others. Outpatient level of care is deemed appropriate at this present time. Vazquez Dunlap understands that if they are no longer able to contract for safety, they need to call/contact the outpatient office including this writer, call/contact crisis and/orattend to the nearest Emergency Department for immediate evaluation. Risk of Harm to Self:  The following ratings are based on assessment at the time of the interview  Demographic risk factors include: age: young adult (15-24)  Historical Risk Factors include: chronic depressive symptoms, chronic anxiety symptoms  Recent Specific Risk Factors include: diagnosis of mood disorder, current depressive symptoms, current anxiety symptoms, passive death wishes, feelings of guilt or self blame, hopelessness  Protective Factors: no current suicidal ideation, access to mental health treatment, compliant with medications, compliant with mental health treatment, no substance use problems, opportunities to participate in community, resiliency, responsibilities and duties to others, restricted access to lethal means, stable living environment, stable job, supportive family  Weapons: none. The following steps have been taken to ensure weapons are properly secured: not applicable  Based on today's assessment, Vazquez Dunlap presents the following risk of harm to self: low     Risk of Harm to Others: The following ratings are based on assessment at the time of the interview  Demographic Risk Factors include: [age] years of age. Historical Risk Factors include: none.   Recent Specific Risk Factors include: concomitant mood disorder, multiple stressors. Protective Factors: no current homicidal ideation, access to mental health treatment, compliant with medications, compliant with mental health treatment, no substance use problems, resilience, responsibilities and duties to others, restricted access to lethal means, safe and stable living environment, supportive family  Weapons: none. The following steps have been taken to ensure weapons are properly secured: not applicable  Based on today's assessment, Vazquez Dunlap presents the following risk of harm to others: low      Psychotherapy Provided:     Individual psychotherapy provided: Yes  Counseling was provided during the session today for 25 minutes. Medications, treatment progress and treatment plan reviewed with Vazquez Dunlap. Discussed with Vazquez Dunlap different levels of outpatient care vs inpatient level of care. Supportive therapy provided. Crisis/safety plan discussed with Vazquez Dunlap. Treatment Plan:    Completed and signed during the session: Not applicable - Treatment Plan not due at this session    This note was not shared with the patient due to reasonable likelihood of causing patient harm. Amy Claudio DO  Psychiatry Resident, PGY-III    This note has been constructed using a voice recognition system. There may be translation, syntax, or grammatical errors. If you have any questions, please contact the dictating provider.

## 2023-10-11 ENCOUNTER — OFFICE VISIT (OUTPATIENT)
Dept: BEHAVIORAL/MENTAL HEALTH CLINIC | Facility: CLINIC | Age: 23
End: 2023-10-11

## 2023-10-11 DIAGNOSIS — F41.9 ANXIETY DISORDER, UNSPECIFIED TYPE: ICD-10-CM

## 2023-10-11 DIAGNOSIS — F33.2 SEVERE EPISODE OF RECURRENT MAJOR DEPRESSIVE DISORDER, WITHOUT PSYCHOTIC FEATURES (HCC): Primary | ICD-10-CM

## 2023-10-11 NOTE — PATIENT INSTRUCTIONS
Continue Wellbutrin  mg daily  Continue Effexor  mg daily    Please follow up at [address] office at [address] [facility], [address] [facility] on 10/26/23 at 11:00 am  You are being referred for psychotherapy and PHP/IOP programs, please respond to intake calls. Please call the office nursing staff for medication issues including refills, problems obtaining medications, side effects, etc at [phone]. Please return for a follow up appointment as discussed. If you are running late or are unable to attend your appointment, please call our [facility] office at [phone]. If you are in psychological crisis including not limited to suicidal/homicidal thoughts or thoughts of self-injury, do not hesitate to call/contact your [address] hotline (see below), call 911, call [phone] (mental health crisis), or go to the nearest emergency department.   [facility] Crisis: [facility] Crisis: [phone]  [address] [facility] Crisis: [facility] Crisis: [facility] Crisis: [address] Crisis: [address] [facility] Crisis: [phone]  National Suicide Prevention Hotline: 8-560.300.6330

## 2023-10-12 ENCOUNTER — TELEPHONE (OUTPATIENT)
Dept: PSYCHOLOGY | Facility: CLINIC | Age: 23
End: 2023-10-12

## 2023-10-13 DIAGNOSIS — F32.1 CURRENT MODERATE EPISODE OF MAJOR DEPRESSIVE DISORDER WITHOUT PRIOR EPISODE (HCC): ICD-10-CM

## 2023-10-13 RX ORDER — BUPROPION HYDROCHLORIDE 150 MG/1
TABLET ORAL
Qty: 30 TABLET | Refills: 0 | Status: SHIPPED | OUTPATIENT
Start: 2023-10-13

## 2023-10-13 NOTE — TELEPHONE ENCOUNTER
Sowmya Coates had left a message earlier, requesting a refill of Wellbutrin, which was sent. She also said Dr. Benja Stevens indicated someone would call her about therapy and she has not heard from anyone.

## 2023-10-16 ENCOUNTER — TELEPHONE (OUTPATIENT)
Dept: PSYCHIATRY | Facility: CLINIC | Age: 23
End: 2023-10-16

## 2023-10-19 ENCOUNTER — TELEPHONE (OUTPATIENT)
Dept: PSYCHIATRY | Facility: CLINIC | Age: 23
End: 2023-10-19

## 2023-10-19 NOTE — TELEPHONE ENCOUNTER
Contacted patient in regards to [employer] in attempts to schedule patient for appointment. Spoke w, patient whom stated they preferred in person appointment but has no gender preferences. Was able to schedule patient for next in person appointment.        11/2 @ 8a [facility]

## 2023-10-26 ENCOUNTER — HOSPITAL ENCOUNTER (INPATIENT)
Facility: HOSPITAL | Age: 23
LOS: 4 days | Discharge: HOME | DRG: 885 | End: 2023-10-30
Attending: STUDENT IN AN ORGANIZED HEALTH CARE EDUCATION/TRAINING PROGRAM | Admitting: STUDENT IN AN ORGANIZED HEALTH CARE EDUCATION/TRAINING PROGRAM
Payer: COMMERCIAL

## 2023-10-26 ENCOUNTER — OFFICE VISIT (OUTPATIENT)
Dept: PSYCHIATRY | Facility: CLINIC | Age: 23
End: 2023-10-26
Payer: COMMERCIAL

## 2023-10-26 ENCOUNTER — HOSPITAL ENCOUNTER (EMERGENCY)
Facility: HOSPITAL | Age: 23
End: 2023-10-26
Attending: EMERGENCY MEDICINE
Payer: COMMERCIAL

## 2023-10-26 VITALS
BODY MASS INDEX: 23.7 KG/M2 | TEMPERATURE: 98.3 F | SYSTOLIC BLOOD PRESSURE: 133 MMHG | DIASTOLIC BLOOD PRESSURE: 62 MMHG | HEIGHT: 62 IN | RESPIRATION RATE: 18 BRPM | HEART RATE: 91 BPM | OXYGEN SATURATION: 98 %

## 2023-10-26 VITALS — WEIGHT: 129.6 LBS | BODY MASS INDEX: 23.7 KG/M2

## 2023-10-26 DIAGNOSIS — F33.2 SEVERE EPISODE OF RECURRENT MAJOR DEPRESSIVE DISORDER, WITHOUT PSYCHOTIC FEATURES (HCC): Primary | ICD-10-CM

## 2023-10-26 DIAGNOSIS — Z00.8 MEDICAL CLEARANCE FOR PSYCHIATRIC ADMISSION: ICD-10-CM

## 2023-10-26 DIAGNOSIS — Z00.8 MEDICAL CLEARANCE FOR PSYCHIATRIC ADMISSION: Primary | ICD-10-CM

## 2023-10-26 DIAGNOSIS — E55.9 VITAMIN D DEFICIENCY: ICD-10-CM

## 2023-10-26 DIAGNOSIS — F41.9 ANXIETY DISORDER, UNSPECIFIED TYPE: ICD-10-CM

## 2023-10-26 DIAGNOSIS — F32.A DEPRESSION: ICD-10-CM

## 2023-10-26 DIAGNOSIS — E53.8 VITAMIN B12 DEFICIENCY: ICD-10-CM

## 2023-10-26 DIAGNOSIS — F32.2 CURRENT SEVERE EPISODE OF MAJOR DEPRESSIVE DISORDER WITHOUT PSYCHOTIC FEATURES WITHOUT PRIOR EPISODE (HCC): Primary | ICD-10-CM

## 2023-10-26 LAB
AMPHETAMINES SERPL QL SCN: NEGATIVE
BARBITURATES UR QL: NEGATIVE
BENZODIAZ UR QL: NEGATIVE
COCAINE UR QL: NEGATIVE
ETHANOL EXG-MCNC: 0 MG/DL
EXT PREGNANCY TEST URINE: NEGATIVE
EXT. CONTROL: NORMAL
METHADONE UR QL: NEGATIVE
OPIATES UR QL SCN: NEGATIVE
OXYCODONE+OXYMORPHONE UR QL SCN: NEGATIVE
PCP UR QL: NEGATIVE
THC UR QL: NEGATIVE

## 2023-10-26 PROCEDURE — 81025 URINE PREGNANCY TEST: CPT

## 2023-10-26 PROCEDURE — 82075 ASSAY OF BREATH ETHANOL: CPT

## 2023-10-26 PROCEDURE — 80307 DRUG TEST PRSMV CHEM ANLYZR: CPT

## 2023-10-26 PROCEDURE — 99215 OFFICE O/P EST HI 40 MIN: CPT

## 2023-10-26 PROCEDURE — 99284 EMERGENCY DEPT VISIT MOD MDM: CPT

## 2023-10-26 PROCEDURE — 93005 ELECTROCARDIOGRAM TRACING: CPT

## 2023-10-26 PROCEDURE — 99285 EMERGENCY DEPT VISIT HI MDM: CPT | Performed by: EMERGENCY MEDICINE

## 2023-10-26 RX ORDER — LANOLIN ALCOHOL/MO/W.PET/CERES
3 CREAM (GRAM) TOPICAL
Status: CANCELLED | OUTPATIENT
Start: 2023-10-26

## 2023-10-26 RX ORDER — OLANZAPINE 10 MG/1
5 TABLET ORAL
Status: CANCELLED | OUTPATIENT
Start: 2023-10-26

## 2023-10-26 RX ORDER — ACETAMINOPHEN 325 MG/1
975 TABLET ORAL EVERY 6 HOURS PRN
Status: CANCELLED | OUTPATIENT
Start: 2023-10-26

## 2023-10-26 RX ORDER — ACETAMINOPHEN 325 MG/1
650 TABLET ORAL EVERY 6 HOURS PRN
Status: DISCONTINUED | OUTPATIENT
Start: 2023-10-26 | End: 2023-10-30 | Stop reason: HOSPADM

## 2023-10-26 RX ORDER — OLANZAPINE 2.5 MG/1
2.5 TABLET ORAL
Status: CANCELLED | OUTPATIENT
Start: 2023-10-26

## 2023-10-26 RX ORDER — HYDROXYZINE HYDROCHLORIDE 25 MG/1
25 TABLET, FILM COATED ORAL
Status: CANCELLED | OUTPATIENT
Start: 2023-10-26

## 2023-10-26 RX ORDER — OLANZAPINE 10 MG/2ML
2.5 INJECTION, POWDER, FOR SOLUTION INTRAMUSCULAR
Status: CANCELLED | OUTPATIENT
Start: 2023-10-26

## 2023-10-26 RX ORDER — LORAZEPAM 2 MG/ML
2 INJECTION INTRAMUSCULAR EVERY 6 HOURS PRN
Status: CANCELLED | OUTPATIENT
Start: 2023-10-26

## 2023-10-26 RX ORDER — ACETAMINOPHEN 325 MG/1
650 TABLET ORAL EVERY 6 HOURS PRN
Status: CANCELLED | OUTPATIENT
Start: 2023-10-26

## 2023-10-26 RX ORDER — MAGNESIUM HYDROXIDE/ALUMINUM HYDROXICE/SIMETHICONE 120; 1200; 1200 MG/30ML; MG/30ML; MG/30ML
30 SUSPENSION ORAL EVERY 4 HOURS PRN
Status: CANCELLED | OUTPATIENT
Start: 2023-10-26

## 2023-10-26 RX ORDER — OLANZAPINE 10 MG/2ML
2.5 INJECTION, POWDER, FOR SOLUTION INTRAMUSCULAR
Status: DISCONTINUED | OUTPATIENT
Start: 2023-10-26 | End: 2023-10-30 | Stop reason: HOSPADM

## 2023-10-26 RX ORDER — OLANZAPINE 2.5 MG/1
2.5 TABLET ORAL
Status: DISCONTINUED | OUTPATIENT
Start: 2023-10-26 | End: 2023-10-30 | Stop reason: HOSPADM

## 2023-10-26 RX ORDER — HYDROXYZINE HYDROCHLORIDE 25 MG/1
100 TABLET, FILM COATED ORAL
Status: CANCELLED | OUTPATIENT
Start: 2023-10-26

## 2023-10-26 RX ORDER — HYDROXYZINE HYDROCHLORIDE 25 MG/1
50 TABLET, FILM COATED ORAL
Status: CANCELLED | OUTPATIENT
Start: 2023-10-26

## 2023-10-26 RX ORDER — ACETAMINOPHEN 325 MG/1
650 TABLET ORAL EVERY 4 HOURS PRN
Status: CANCELLED | OUTPATIENT
Start: 2023-10-26

## 2023-10-26 RX ORDER — OLANZAPINE 10 MG/2ML
5 INJECTION, POWDER, FOR SOLUTION INTRAMUSCULAR
Status: DISCONTINUED | OUTPATIENT
Start: 2023-10-26 | End: 2023-10-30 | Stop reason: HOSPADM

## 2023-10-26 RX ORDER — MAGNESIUM HYDROXIDE/ALUMINUM HYDROXICE/SIMETHICONE 120; 1200; 1200 MG/30ML; MG/30ML; MG/30ML
30 SUSPENSION ORAL EVERY 4 HOURS PRN
Status: DISCONTINUED | OUTPATIENT
Start: 2023-10-26 | End: 2023-10-30 | Stop reason: HOSPADM

## 2023-10-26 RX ORDER — BENZTROPINE MESYLATE 1 MG/ML
1 INJECTION INTRAMUSCULAR; INTRAVENOUS
Status: CANCELLED | OUTPATIENT
Start: 2023-10-26

## 2023-10-26 RX ORDER — DIPHENHYDRAMINE HYDROCHLORIDE 50 MG/ML
50 INJECTION INTRAMUSCULAR; INTRAVENOUS EVERY 6 HOURS PRN
Status: DISCONTINUED | OUTPATIENT
Start: 2023-10-26 | End: 2023-10-30 | Stop reason: HOSPADM

## 2023-10-26 RX ORDER — AMOXICILLIN 250 MG
1 CAPSULE ORAL DAILY PRN
Status: DISCONTINUED | OUTPATIENT
Start: 2023-10-26 | End: 2023-10-30 | Stop reason: HOSPADM

## 2023-10-26 RX ORDER — BENZTROPINE MESYLATE 1 MG/ML
1 INJECTION INTRAMUSCULAR; INTRAVENOUS
Status: DISCONTINUED | OUTPATIENT
Start: 2023-10-26 | End: 2023-10-30 | Stop reason: HOSPADM

## 2023-10-26 RX ORDER — BENZTROPINE MESYLATE 1 MG/1
1 TABLET ORAL
Status: DISCONTINUED | OUTPATIENT
Start: 2023-10-26 | End: 2023-10-30 | Stop reason: HOSPADM

## 2023-10-26 RX ORDER — HYDROXYZINE 50 MG/1
50 TABLET, FILM COATED ORAL
Status: DISCONTINUED | OUTPATIENT
Start: 2023-10-26 | End: 2023-10-30 | Stop reason: HOSPADM

## 2023-10-26 RX ORDER — OLANZAPINE 5 MG/1
5 TABLET ORAL
Status: DISCONTINUED | OUTPATIENT
Start: 2023-10-26 | End: 2023-10-30 | Stop reason: HOSPADM

## 2023-10-26 RX ORDER — LANOLIN ALCOHOL/MO/W.PET/CERES
3 CREAM (GRAM) TOPICAL
Status: DISCONTINUED | OUTPATIENT
Start: 2023-10-26 | End: 2023-10-30 | Stop reason: HOSPADM

## 2023-10-26 RX ORDER — HYDROXYZINE 50 MG/1
100 TABLET, FILM COATED ORAL
Status: DISCONTINUED | OUTPATIENT
Start: 2023-10-26 | End: 2023-10-30 | Stop reason: HOSPADM

## 2023-10-26 RX ORDER — POLYETHYLENE GLYCOL 3350 17 G/17G
17 POWDER, FOR SOLUTION ORAL DAILY PRN
Status: CANCELLED | OUTPATIENT
Start: 2023-10-26

## 2023-10-26 RX ORDER — LORAZEPAM 2 MG/ML
2 INJECTION INTRAMUSCULAR EVERY 6 HOURS PRN
Status: DISCONTINUED | OUTPATIENT
Start: 2023-10-26 | End: 2023-10-30 | Stop reason: HOSPADM

## 2023-10-26 RX ORDER — ACETAMINOPHEN 325 MG/1
975 TABLET ORAL EVERY 6 HOURS PRN
Status: DISCONTINUED | OUTPATIENT
Start: 2023-10-26 | End: 2023-10-30 | Stop reason: HOSPADM

## 2023-10-26 RX ORDER — OLANZAPINE 10 MG/2ML
5 INJECTION, POWDER, FOR SOLUTION INTRAMUSCULAR
Status: CANCELLED | OUTPATIENT
Start: 2023-10-26

## 2023-10-26 RX ORDER — ACETAMINOPHEN 325 MG/1
650 TABLET ORAL EVERY 4 HOURS PRN
Status: DISCONTINUED | OUTPATIENT
Start: 2023-10-26 | End: 2023-10-30 | Stop reason: HOSPADM

## 2023-10-26 RX ORDER — DIPHENHYDRAMINE HYDROCHLORIDE 50 MG/ML
50 INJECTION INTRAMUSCULAR; INTRAVENOUS EVERY 6 HOURS PRN
Status: CANCELLED | OUTPATIENT
Start: 2023-10-26

## 2023-10-26 RX ORDER — POLYETHYLENE GLYCOL 3350 17 G/17G
17 POWDER, FOR SOLUTION ORAL DAILY PRN
Status: DISCONTINUED | OUTPATIENT
Start: 2023-10-26 | End: 2023-10-30 | Stop reason: HOSPADM

## 2023-10-26 RX ORDER — AMOXICILLIN 250 MG
1 CAPSULE ORAL DAILY PRN
Status: CANCELLED | OUTPATIENT
Start: 2023-10-26

## 2023-10-26 RX ORDER — BENZTROPINE MESYLATE 1 MG/1
1 TABLET ORAL
Status: CANCELLED | OUTPATIENT
Start: 2023-10-26

## 2023-10-26 RX ORDER — HYDROXYZINE HYDROCHLORIDE 25 MG/1
25 TABLET, FILM COATED ORAL
Status: DISCONTINUED | OUTPATIENT
Start: 2023-10-26 | End: 2023-10-30 | Stop reason: HOSPADM

## 2023-10-26 NOTE — ED NOTES
Pt comes to the ed along with her mother who works at [employer]. Pt lives with mom and works full time at a blood [employer]. Pt was at an outpatient psychiatry appointment on with [facility]. Pt saw Dr Musa Ragland today, who called to inform that pt was on her way to the ed. Pt had expressed thoughts of suicide without a specific plan. Pt reports depression for several years. She is med compliant. Pt does not have a therapist. She reports crying more often and anxiety regularly. Pt denies homicidal ideation as well as hallucinations. Pt reports poor appetite and sleeps too much. Pt denies any D&A use or legal issues. Pt has family and friends who are supportive. She denies any past suicide attempts or self harm. Pt denies any past trauma or abuse. Her parents  as a child. She was unable to give other specific stressors but currently feels hopeless. Pt signed a 201 for inpatient psych tx. Her mom appears supportive. Pt is calm and cooperative.

## 2023-10-26 NOTE — PLAN OF CARE
Problem: Ineffective Coping  Goal: Participates in unit activities  Description: Interventions:  - Provide therapeutic environment   - Provide required programming   - Redirect inappropriate behaviors   Outcome: Not Progressing     Problem: Depression  Goal: Verbalize thoughts and feelings  Description: Interventions:  - Assess and re-assess patient's level of risk   - Engage patient in 1:1 interactions, daily, for a minimum of 15 minutes   - Encourage patient to express feelings, fears, frustrations, hopes   Outcome: Not Progressing  Goal: Refrain from harming self  Description: Interventions:  - Monitor patient closely, per order   - Supervise medication ingestion, monitor effects and side effects   Outcome: Progressing  Goal: Refrain from isolation  Description: Interventions:  - Develop a trusting relationship   - Encourage socialization   Outcome: Not Progressing  Goal: Refrain from self-neglect  Outcome: Not Progressing     Problem: Anxiety  Goal: Anxiety is at manageable level  Description: Interventions:  - Assess and monitor patient's anxiety level. - Monitor for signs and symptoms (heart palpitations, chest pain, shortness of breath, headaches, nausea, feeling jumpy, restlessness, irritable, apprehensive). - Collaborate with interdisciplinary team and initiate plan and interventions as ordered.   - Perry patient to unit/surroundings  - Explain treatment plan  - Encourage participation in care  - Encourage verbalization of concerns/fears  - Identify coping mechanisms  - Assist in developing anxiety-reducing skills  - Administer/offer alternative therapies  - Limit or eliminate stimulants  Outcome: Not Progressing

## 2023-10-26 NOTE — ED PROVIDER NOTES
History  Chief Complaint   Patient presents with    Depression     Pt states she"just feels really sad". Pt is tearful and states that she"wants to feel better". Patient is a 21-year-old female with past medical history of depression resenting with worsening depressive symptoms. She states that she is having difficulty going about her daily life, sleeping a lot more than usual, and having lack of motivation. She denies suicidal ideation or homicidal ideation. She has been trying to get in with outpatient psychiatry for almost 2 years without any success. She is currently taking Wellbutrin and Effexor XR which have helped, but not completely. Recently, the symptoms have been getting worse and her current psychiatrist recommended that she get inpatient help. Patient presented to the ED for voluntary inpatient stay. Patient has no physical symptoms at this time. He denies headache, lightheadedness, dizziness, chest pain, shortness of breath, fever, chills, abdominal pain, nausea, vomiting, diarrhea, numbness, tingling, or weakness. Prior to Admission Medications   Prescriptions Last Dose Informant Patient Reported? Taking?    buPROPion (Wellbutrin XL) 150 mg 24 hr tablet   No No   Sig: Take 1 tablet (150 mg) daily   levalbuterol (XOPENEX HFA) 45 mcg/act inhaler   No No   Sig: Inhale 1-2 puffs every 4 (four) hours as needed for wheezing   levalbuterol (Xopenex) 0.63 mg/3 mL nebulizer solution   No No   Sig: Take 3 mL (0.63 mg total) by nebulization every 8 (eight) hours as needed for wheezing or shortness of breath   norgestimate-ethinyl estradiol (ORTHO TRI-CYCLEN LO) 0.18/0.215/0.25 MG-25 MCG per tablet   No No   Sig: Take 1 tablet by mouth daily   venlafaxine 150 MG TB24 24 hr tablet   No No   Sig: Take 1 tablet (150 mg total) by mouth daily with breakfast      Facility-Administered Medications Last Administration Doses Remaining   levalbuterol (XOPENEX) inhalation solution 0.63 mg 2/8/2019  6:05 PM Past Medical History:   Diagnosis Date    Asthma exacerbation, mild     last assessed - 06Oct2016    Chronic tonsillitis     Eczema     Fracture of phalanx of right index finger     Menorrhagia     last assessed - 74    Mild asthma with acute exacerbation 2/8/2019    Obstructive sleep apnea of child     Orthostatic dizziness     last assessed - 20Nov2015    Premenarchal     Seasonal allergies     Resolved - [id number]    Tinea corporis 5/15/2020       Past Surgical History:   Procedure Laterality Date    TONSILLECTOMY AND ADENOIDECTOMY         Family History   Adopted: Yes   Family history unknown: Yes     I have reviewed and agree with the history as documented. E-Cigarette/Vaping    E-Cigarette Use Never User      E-Cigarette/Vaping Substances    Nicotine No     THC No     CBD No     Flavoring No     Other No      Social History     Tobacco Use    Smoking status: Never    Smokeless tobacco: Never    Tobacco comments:     No tobacco/smoke exposure   Vaping Use    Vaping Use: Never used   Substance Use Topics    Alcohol use: Never    Drug use: Never        Review of Systems    Physical Exam  ED Triage Vitals   Temperature Pulse Respirations Blood Pressure SpO2   10/26/23 1428 10/26/23 1245 10/26/23 1245 10/26/23 1245 10/26/23 1245   98.3 °F (36.8 °C) 91 18 133/62 98 %      Temp Source Heart Rate Source Patient Position - Orthostatic VS BP Location FiO2 (%)   10/26/23 1428 10/26/23 1245 10/26/23 1245 10/26/23 1245 --   Oral Monitor Lying Left arm       Pain Score       --                    Orthostatic Vital Signs  Vitals:    10/26/23 1245   BP: 133/62   Pulse: 91   Patient Position - Orthostatic VS: Lying       Physical Exam  Vitals and nursing note reviewed. Constitutional:       General: She is not in acute distress. Appearance: Normal appearance. She is not ill-appearing, toxic-appearing or diaphoretic. HENT:      Head: Normocephalic and atraumatic.       Mouth/Throat:      Mouth: Mucous membranes are moist.   Eyes:      Extraocular Movements: Extraocular movements intact. Cardiovascular:      Rate and Rhythm: Normal rate and regular rhythm. Heart sounds: Normal heart sounds. Pulmonary:      Effort: Pulmonary effort is normal. No respiratory distress. Breath sounds: Normal breath sounds. Abdominal:      General: Abdomen is flat. Palpations: Abdomen is soft. Tenderness: There is no abdominal tenderness. Musculoskeletal:      Cervical back: Normal range of motion. Skin:     General: Skin is warm and dry. Neurological:      General: No focal deficit present. Mental Status: She is alert and oriented to person, place, and time. Motor: No weakness. Psychiatric:         Mood and Affect: Mood is depressed. Affect is tearful. Behavior: Behavior is cooperative. ED Medications  Medications - No data to display    Diagnostic Studies  Results Reviewed       Procedure Component Value Units Date/Time    Rapid drug screen, urine [[id number]]  (Normal) Collected: 10/26/23 1320    Lab Status: Final result Specimen: Urine, Clean Catch Updated: 10/26/23 1355     Amph/Meth UR Negative     Barbiturate Ur Negative     Benzodiazepine Urine Negative     Cocaine Urine Negative     Methadone Urine Negative     Opiate Urine Negative     PCP Ur Negative     THC Urine Negative     Oxycodone Urine Negative    Narrative:      FOR MEDICAL PURPOSES ONLY. IF CONFIRMATION NEEDED PLEASE CONTACT THE LAB WITHIN 5 DAYS.     Drug Screen Cutoff Levels:  AMPHETAMINE/METHAMPHETAMINES  1000 ng/mL  BARBITURATES     200 ng/mL  BENZODIAZEPINES     200 ng/mL  COCAINE      300 ng/mL  METHADONE      300 ng/mL  OPIATES      300 ng/mL  PHENCYCLIDINE     25 ng/mL  THC       50 ng/mL  OXYCODONE      100 ng/mL    POCT pregnancy, urine [[id number]]  (Normal) Resulted: 10/26/23 1324    Lab Status: Final result Updated: 10/26/23 1324     EXT Preg Test, Ur Negative     Control Valid    POCT alcohol breath test [[id number]]  (Normal) Resulted: 10/26/23 1321    Lab Status: Final result Updated: 10/26/23 1321     EXTBreath Alcohol 0.000                   No orders to display         Procedures  Procedures      ED Course                                       Medical Decision Making  Is a 61-year-old female presenting with depression. Patient is seeking to voluntarily sign into inpatient treatment. She denies SI or HI.  UDS, breath alcohol, and urine pregnancy ordered. Crisis consult for placement. Patient was medically cleared for transfer to behavioral health. Patient was transferred to behavioral health. Amount and/or Complexity of Data Reviewed  Labs: ordered. Risk  Decision regarding hospitalization. Disposition  Final diagnoses:   Depression     Time reflects when diagnosis was documented in both MDM as applicable and the Disposition within this note       Time User Action Codes Description Comment    10/26/2023  2:29 PM Pal Solitario [Z00.8] Medical clearance for psychiatric admission     10/26/2023  2:41 PM Rick, [address] [F32. A] Depression           ED Disposition       ED Disposition   Transfer to Behavioral [facility]    Condition   --    Date/Time   Thu Oct 26, 2023  2:41 PM    Comment   [facility] should be transferred out to [facility] and has been medically cleared.                 MD Documentation      Harsh Baird Most Recent Value   Accepting Physician [facility] Name, Moses Smith by [employer] and Unit #) CTS   Sending MD Maureen Fitzgerald          RN Documentation      [address] Name, Moses Smith by [facility] and Unit #) CTS          Follow-up Information    None         Discharge Medication List as of 10/26/2023  3:26 PM        CONTINUE these medications which have NOT CHANGED    Details   buPROPion (Wellbutrin XL) 150 mg 24 hr tablet Take 1 tablet (150 mg) daily, Normal levalbuterol (XOPENEX HFA) 45 mcg/act inhaler Inhale 1-2 puffs every 4 (four) hours as needed for wheezing, Starting Thu 10/14/2021, Normal      levalbuterol (Xopenex) 0.63 mg/3 mL nebulizer solution Take 3 mL (0.63 mg total) by nebulization every 8 (eight) hours as needed for wheezing or shortness of breath, Starting Thu 10/14/2021, Normal      norgestimate-ethinyl estradiol (ORTHO TRI-CYCLEN LO) 0.18/0.215/0.25 MG-25 MCG per tablet Take 1 tablet by mouth daily, Starting Tue 12/27/2022, Normal      venlafaxine 150 MG TB24 24 hr tablet Take 1 tablet (150 mg total) by mouth daily with breakfast, Starting Tue 7/25/2023, Until Mon 10/23/2023, Normal           No discharge procedures on file. PDMP Review         Value Time User    PDMP Reviewed  Yes 3/21/2023  8:44 AM Becca Gutierrez MD             ED Provider  Attending physically available and evaluated Donta Prado. I managed the patient along with the ED Attending.     Electronically Signed by           Davy Henderson MD  10/26/23 2854
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
## 2023-10-26 NOTE — ED NOTES
Insurance Authorization for admission:     The auth request form was filled out online with [employer]

## 2023-10-26 NOTE — LETTER
Problem: Fall/Injury  Goal: Not fall by end of shift  10/20/2023 0221 by Keyla Ding RN  Outcome: Progressing  10/20/2023 0220 by Keyla Ding RN  Outcome: Progressing  Goal: Be free from injury by end of the shift  10/20/2023 0221 by Keyla Ding RN  Outcome: Progressing  10/20/2023 0220 by Keyla Ding RN  Outcome: Progressing  Goal: Verbalize understanding of personal risk factors for fall in the hospital  10/20/2023 0221 by Keyla Ding RN  Outcome: Progressing  10/20/2023 0220 by Keyla Ding RN  Outcome: Progressing  Goal: Verbalize understanding of risk factor reduction measures to prevent injury from fall in the home  10/20/2023 0221 by Keyla Ding RN  Outcome: Progressing  10/20/2023 0220 by Keyla Ding RN  Outcome: Progressing  Goal: Use assistive devices by end of the shift  10/20/2023 0221 by Keyla Ding RN  Outcome: Progressing  10/20/2023 0220 by Keyla Ding RN  Outcome: Progressing  Goal: Pace activities to prevent fatigue by end of the shift  10/20/2023 0221 by Keyla Ding RN  Outcome: Progressing  10/20/2023 0220 by Keyla Ding RN  Outcome: Progressing     Problem: Safety  Goal: Patient will be injury free during hospitalization  10/20/2023 0221 by Keyla Ding RN  Outcome: Progressing  10/20/2023 0220 by Keyla Ding RN  Outcome: Progressing  Goal: I will remain free of falls  10/20/2023 0221 by Keyla Ding RN  Outcome: Progressing  10/20/2023 0220 by Keyla Ding RN  Outcome: Progressing     Problem: Daily Care  Goal: Daily care needs are met  10/20/2023 0221 by Keyla Ding RN  Outcome: Progressing  10/20/2023 0220 by Keyla Ding RN  Outcome: Progressing     Problem: Hypertensive Emergency/Crisis  Goal: Blood pressure gradually reduced to goal range  10/20/2023 0221 by Keyla Frysinger, RN  Outcome: Progressing  10/20/2023 0220 by Keyla Ding RN  Outcome:  [address] [address]  [phone]  Dept: [phone]    October 30, 2023     Patient: Traci Hammond   YOB: 2000   Date of Visit: 10/26/2023       To Whom it May Concern:    Anneliese Martinez was seen in the hospital from 10/26/2023 to 10/30/23. She may return to school on 10/31/23 without limitations. If you have any questions or concerns, please don't hesitate to call.          Sincerely,          Lazaro Lemos, MSW, LSW Progressing  Goal: Promote self management  10/20/2023 0221 by Keyla Ding RN  Outcome: Progressing  10/20/2023 0220 by Keyla Ding RN  Outcome: Progressing   The patient's goals for the shift include  no dizziness    The clinical goals for the shift include pt will have decreased dizziness and remain free from falls

## 2023-10-26 NOTE — ED NOTES
Patient is accepted at [facility]  Patient is accepted by Dr. Martin Treadwell per [address] is arranged with [facility]. Waiting for transport time      Nurse report is to be called to [phone] prior to patient transfer.

## 2023-10-26 NOTE — PATIENT INSTRUCTIONS
Patient signed 12 and was assisted in transportation to emergency room. Please call the office nursing staff for medication issues including refills, problems obtaining medications, side effects, etc at [phone]. Please return for a follow up appointment as discussed. If you are running late or are unable to attend your appointment, please call our [facility] office at [phone]. If you are in psychological crisis including not limited to suicidal/homicidal thoughts or thoughts of self-injury, do not hesitate to call/contact your [facility] hotline (see below), call 911, call [phone] (mental health crisis), or go to the nearest emergency department.   [facility] Crisis: [address] Crisis: [phone]  [address] Crisis: [address] Crisis: [address] Crisis: [address] Crisis: [address] Crisis: [phone]  National Suicide Prevention Hotline: 1-793.892.3235

## 2023-10-26 NOTE — NURSING NOTE
Pt admitted to [facility] from [facility] on a 201 commitment following an appointment with her OP psychiatrist where she voiced SI without plan and increased depression. Pt reports worsening depression stating she is crying more often and does not know the cause. Pt reports anxiety and states she feels hopeless and does not believe she will improve from being here. Pt has had no prior IP stays. Pt tearful but cooperative throughout admission, poor eye contact. Denies SI/HI/AVH on admission. Pt states she is compliant with her medications at home(Effexor 150mg QD, Wellbutrin XL 150mg QD). Medications verified by pharmacy. Pt denies any past or present drug or alcohol use. UDS negative. Pt reports family is a good support system for her. Pt oriented to unit and unit routine. Q7 minute safety checks initiated and maintained.

## 2023-10-27 PROBLEM — Z00.8 MEDICAL CLEARANCE FOR PSYCHIATRIC ADMISSION: Status: ACTIVE | Noted: 2019-06-25

## 2023-10-27 PROBLEM — F41.1 GENERALIZED ANXIETY DISORDER: Status: ACTIVE | Noted: 2023-10-27

## 2023-10-27 PROBLEM — F32.2 CURRENT SEVERE EPISODE OF MAJOR DEPRESSIVE DISORDER WITHOUT PSYCHOTIC FEATURES (HCC): Status: ACTIVE | Noted: 2023-10-27

## 2023-10-27 PROBLEM — F41.8 OTHER SPECIFIED ANXIETY DISORDERS: Status: ACTIVE | Noted: 2023-10-27

## 2023-10-27 PROBLEM — F33.2 SEVERE EPISODE OF RECURRENT MAJOR DEPRESSIVE DISORDER, WITHOUT PSYCHOTIC FEATURES (HCC): Status: ACTIVE | Noted: 2023-10-27

## 2023-10-27 PROBLEM — F41.9 ANXIETY DISORDER: Status: ACTIVE | Noted: 2023-10-27

## 2023-10-27 LAB
25(OH)D3 SERPL-MCNC: 32.5 NG/ML (ref 30–100)
ALBUMIN SERPL BCP-MCNC: 4.2 G/DL (ref 3.5–5)
ALP SERPL-CCNC: 48 U/L (ref 34–104)
ALT SERPL W P-5'-P-CCNC: 11 U/L (ref 7–52)
ANION GAP SERPL CALCULATED.3IONS-SCNC: 9 MMOL/L
AST SERPL W P-5'-P-CCNC: 17 U/L (ref 13–39)
ATRIAL RATE: 94 BPM
BASOPHILS # BLD AUTO: 0.02 THOUSANDS/ÂΜL (ref 0–0.1)
BASOPHILS NFR BLD AUTO: 1 % (ref 0–1)
BILIRUB SERPL-MCNC: 0.29 MG/DL (ref 0.2–1)
BUN SERPL-MCNC: 12 MG/DL (ref 5–25)
CALCIUM SERPL-MCNC: 9.9 MG/DL (ref 8.4–10.2)
CHLORIDE SERPL-SCNC: 103 MMOL/L (ref 96–108)
CHOLEST SERPL-MCNC: 201 MG/DL
CO2 SERPL-SCNC: 28 MMOL/L (ref 21–32)
CREAT SERPL-MCNC: 0.88 MG/DL (ref 0.6–1.3)
EOSINOPHIL # BLD AUTO: 0.03 THOUSAND/ÂΜL (ref 0–0.61)
EOSINOPHIL NFR BLD AUTO: 1 % (ref 0–6)
ERYTHROCYTE [DISTWIDTH] IN BLOOD BY AUTOMATED COUNT: 13.8 % (ref 11.6–15.1)
EST. AVERAGE GLUCOSE BLD GHB EST-MCNC: 108 MG/DL
GFR SERPL CREATININE-BSD FRML MDRD: 93 ML/MIN/1.73SQ M
GLUCOSE P FAST SERPL-MCNC: 97 MG/DL (ref 65–99)
GLUCOSE SERPL-MCNC: 97 MG/DL (ref 65–140)
HBA1C MFR BLD: 5.4 %
HCT VFR BLD AUTO: 38.1 % (ref 34.8–46.1)
HDLC SERPL-MCNC: 86 MG/DL
HGB BLD-MCNC: 11.6 G/DL (ref 11.5–15.4)
IMM GRANULOCYTES # BLD AUTO: 0 THOUSAND/UL (ref 0–0.2)
IMM GRANULOCYTES NFR BLD AUTO: 0 % (ref 0–2)
LDLC SERPL CALC-MCNC: 91 MG/DL (ref 0–100)
LYMPHOCYTES # BLD AUTO: 1.19 THOUSANDS/ÂΜL (ref 0.6–4.47)
LYMPHOCYTES NFR BLD AUTO: 39 % (ref 14–44)
MAGNESIUM SERPL-MCNC: 2.1 MG/DL (ref 1.9–2.7)
MCH RBC QN AUTO: 26.2 PG (ref 26.8–34.3)
MCHC RBC AUTO-ENTMCNC: 30.4 G/DL (ref 31.4–37.4)
MCV RBC AUTO: 86 FL (ref 82–98)
MONOCYTES # BLD AUTO: 0.29 THOUSAND/ÂΜL (ref 0.17–1.22)
MONOCYTES NFR BLD AUTO: 10 % (ref 4–12)
NEUTROPHILS # BLD AUTO: 1.5 THOUSANDS/ÂΜL (ref 1.85–7.62)
NEUTS SEG NFR BLD AUTO: 49 % (ref 43–75)
NONHDLC SERPL-MCNC: 115 MG/DL
NRBC BLD AUTO-RTO: 0 /100 WBCS
P AXIS: 54 DEGREES
PHOSPHATE SERPL-MCNC: 2.7 MG/DL (ref 2.7–4.5)
PLATELET # BLD AUTO: 247 THOUSANDS/UL (ref 149–390)
PMV BLD AUTO: 9.4 FL (ref 8.9–12.7)
POTASSIUM SERPL-SCNC: 4.2 MMOL/L (ref 3.5–5.3)
PR INTERVAL: 172 MS
PROT SERPL-MCNC: 7.4 G/DL (ref 6.4–8.4)
QRS AXIS: 78 DEGREES
QRSD INTERVAL: 80 MS
QT INTERVAL: 370 MS
QTC INTERVAL: 462 MS
RBC # BLD AUTO: 4.42 MILLION/UL (ref 3.81–5.12)
SODIUM SERPL-SCNC: 140 MMOL/L (ref 135–147)
T WAVE AXIS: 52 DEGREES
TREPONEMA PALLIDUM IGG+IGM AB [PRESENCE] IN SERUM OR PLASMA BY IMMUNOASSAY: NORMAL
TRIGL SERPL-MCNC: 118 MG/DL
TSH SERPL DL<=0.05 MIU/L-ACNC: 3.27 UIU/ML (ref 0.45–4.5)
VENTRICULAR RATE: 94 BPM
VIT B12 SERPL-MCNC: 325 PG/ML (ref 180–914)
WBC # BLD AUTO: 3.03 THOUSAND/UL (ref 4.31–10.16)

## 2023-10-27 PROCEDURE — 93010 ELECTROCARDIOGRAM REPORT: CPT

## 2023-10-27 PROCEDURE — 84100 ASSAY OF PHOSPHORUS: CPT | Performed by: STUDENT IN AN ORGANIZED HEALTH CARE EDUCATION/TRAINING PROGRAM

## 2023-10-27 PROCEDURE — 83735 ASSAY OF MAGNESIUM: CPT | Performed by: STUDENT IN AN ORGANIZED HEALTH CARE EDUCATION/TRAINING PROGRAM

## 2023-10-27 PROCEDURE — 82306 VITAMIN D 25 HYDROXY: CPT | Performed by: STUDENT IN AN ORGANIZED HEALTH CARE EDUCATION/TRAINING PROGRAM

## 2023-10-27 PROCEDURE — 82607 VITAMIN B-12: CPT | Performed by: STUDENT IN AN ORGANIZED HEALTH CARE EDUCATION/TRAINING PROGRAM

## 2023-10-27 PROCEDURE — 86780 TREPONEMA PALLIDUM: CPT | Performed by: STUDENT IN AN ORGANIZED HEALTH CARE EDUCATION/TRAINING PROGRAM

## 2023-10-27 PROCEDURE — 99223 1ST HOSP IP/OBS HIGH 75: CPT | Performed by: PSYCHIATRY & NEUROLOGY

## 2023-10-27 PROCEDURE — 83036 HEMOGLOBIN GLYCOSYLATED A1C: CPT | Performed by: STUDENT IN AN ORGANIZED HEALTH CARE EDUCATION/TRAINING PROGRAM

## 2023-10-27 PROCEDURE — 99222 1ST HOSP IP/OBS MODERATE 55: CPT | Performed by: NURSE PRACTITIONER

## 2023-10-27 PROCEDURE — 80053 COMPREHEN METABOLIC PANEL: CPT | Performed by: STUDENT IN AN ORGANIZED HEALTH CARE EDUCATION/TRAINING PROGRAM

## 2023-10-27 PROCEDURE — 85025 COMPLETE CBC W/AUTO DIFF WBC: CPT | Performed by: STUDENT IN AN ORGANIZED HEALTH CARE EDUCATION/TRAINING PROGRAM

## 2023-10-27 PROCEDURE — 80061 LIPID PANEL: CPT | Performed by: STUDENT IN AN ORGANIZED HEALTH CARE EDUCATION/TRAINING PROGRAM

## 2023-10-27 PROCEDURE — 84443 ASSAY THYROID STIM HORMONE: CPT | Performed by: STUDENT IN AN ORGANIZED HEALTH CARE EDUCATION/TRAINING PROGRAM

## 2023-10-27 RX ORDER — VENLAFAXINE HYDROCHLORIDE 150 MG/1
150 CAPSULE, EXTENDED RELEASE ORAL DAILY
Status: DISCONTINUED | OUTPATIENT
Start: 2023-10-27 | End: 2023-10-29

## 2023-10-27 RX ORDER — BUPROPION HYDROCHLORIDE 150 MG/1
150 TABLET ORAL DAILY
Status: DISCONTINUED | OUTPATIENT
Start: 2023-10-27 | End: 2023-10-30 | Stop reason: HOSPADM

## 2023-10-27 RX ORDER — ALBUTEROL SULFATE 90 UG/1
2 AEROSOL, METERED RESPIRATORY (INHALATION) EVERY 4 HOURS PRN
Status: DISCONTINUED | OUTPATIENT
Start: 2023-10-27 | End: 2023-10-30 | Stop reason: HOSPADM

## 2023-10-27 RX ADMIN — VENLAFAXINE HYDROCHLORIDE 150 MG: 150 CAPSULE, EXTENDED RELEASE ORAL at 12:14

## 2023-10-27 RX ADMIN — BUPROPION HYDROCHLORIDE 150 MG: 150 TABLET, EXTENDED RELEASE ORAL at 12:14

## 2023-10-27 NOTE — PROGRESS NOTES
10/27/23 0836   Team Meeting   Meeting Type Daily Rounds   Team Members Present   Team Members Present Physician;Nurse;   Physician Team Member [facility]   [facility] Finn   Patient/Family Present   Patient Present No   Patient's Family Present No     Readmit score 17. Pt is a new admit here on a 201 for increasing depression and SI without a plan. Upon admission pt appears sad and withdrawn. Pt remains guarded. Discharge to be determined.

## 2023-10-27 NOTE — ASSESSMENT & PLAN NOTE
Patient is medically cleared for admission to [facility] and treatment of underlying psychiatric illness based on available results. Admit labs pending. No acute medical needs. Will follow-up pending labs. Medicine will sign off.  Please call with additional questions or concerns

## 2023-10-27 NOTE — ED ATTENDING ATTESTATION
10/26/2023  IHarmeet MD, saw and evaluated the patient. I have discussed the patient with the resident/non-physician practitioner and agree with the resident's/non-physician practitioner's findings, Plan of Care, and MDM as documented in the resident's/non-physician practitioner's note, except where noted. All available labs and Radiology studies were reviewed. I was present for key portions of any procedure(s) performed by the resident/non-physician practitioner and I was immediately available to provide assistance. At this point I agree with the current assessment done in the Emergency Department. I have conducted an independent evaluation of this patient a history and physical is as follows:    ED Course     Patient referred in for severe depression by her therapist.  States that she is tearful and states that she wants to feel better. Recommendations by her outpatient therapist was to sign in for inpatient treatment. Patient denies SI or HI at this time. She is coming by family at this time. Vital signs reviewed patient offers no medical complaints at this time. Impression: Encounter for behavioral health evaluation. Differential diagnosis: Major depressive disorder symptoms appear to be severe and disabling this time. Plan to check point-of-care pregnancy test, UDS, ethanol level, will consult crisis for placement. Labs reviewed drug screen negative pregnancy test negative alcohol negative. Patient seen and evaluated by crisis. Case discussed with crisis worker. To transfer to behavioral health facility for inpatient treatment.     Critical Care Time  Procedures

## 2023-10-27 NOTE — CMS CERTIFICATION NOTE
Certification: Based upon physical, mental and social evaluations, I certify that inpatient psychiatric services continue to be medically necessary for this patient for a duration of greater than 2 midnights for the treatment of  Current severe episode of major depressive disorder without psychotic features without prior episode [facility]) that includes depression, depressive signs/symptoms including passive suicidal ideation in addition to anxiety necessitating medication management in addition to assuring appropriate outpatient aftercare    [address] [place], DO

## 2023-10-27 NOTE — H&P
Psychiatric Evaluation - Department [facility] 26 y.o. female MRN: [id number]  Unit/Bed#: U 346-01 Encounter: [id number]    ASSESSMENT & PLAN     Suicide/Homicide Risk Assessment:  Risk of Harm to Self:  The following ratings are based on assessment at the time of the interview and review of records  Demographic risk factors include: never , age: young adult (15-24)  Historical Risk Factors include: chronic psychiatric problems, chronic depression, chronic depressive symptoms, history of depression, chronic anxiety symptoms, history of anxiety, chronic mood disorder, history of mood disorder  Recent Specific Risk Factors include: diagnosis of depression, diagnosis of mood disorder, mental illness diagnosis, current depressive symptoms, current anxiety symptoms, unstable mood, persistent suicidal ideation, has suicidal ideation without a plan, hopelessness  Protective Factors: access to mental health treatment, compliant with medications, compliant with mental health treatment, effective coping skills, effective decision-making skills, effective problem solving skills, having a desire to be alive, having a sense of purpose or meaning in life, resiliency, responsibilities and duties to others, restricted access to lethal means, stable living environment, stable job, strong relationships  Weapons: none. The following steps have been taken to ensure weapons are properly secured: not applicable  Based on today's assessment, Sebas Koyanagi presents the following risk of harm to self:  elevated    Risk of Harm to Others:   The following ratings are based on assessment at the time of the interview and review of records  Demographic risk factors include: never , age: young adult (15-24)  Historical Risk Factors include: chronic psychiatric problems, chronic depression, chronic depressive symptoms, history of depression, chronic anxiety symptoms, history of anxiety, chronic mood disorder, history of mood disorder  Recent Specific Risk Factors include: diagnosis of depression, diagnosis of mood disorder, mental illness diagnosis, current depressive symptoms, current anxiety symptoms, unstable mood, hopelessness  Protective Factors: no homicidal ideation, access to mental health treatment, compliant with medications, compliant with mental health treatment, effective coping skills, effective decision-making skills, effective problem solving skills, having a desire to be alive, having a sense of purpose or meaning in life, resiliency, responsibilities and duties to others, restricted access to lethal means, stable living environment, stable job, strong relationships  Weapons: none. The following steps have been taken to ensure weapons are properly secured: not applicable  Based on today's assessment, Alfonzo Koyanagi presents the following risk of harm to others: elevated    DSM-5 Diagnoses:   Major depressive disorder, current episode, severe without psychotic features  Anxiety disorder, unspecified; consideration for generalized anxiety disorder    Treatment Recommendations/Precautions:  Admission labs evaluated; see below. Continue medical management per medicine. Collaborate with collaterals for baseline assessment and disposition. Continue behavioral kenia checks q.7 minutes. Continue vitals per behavioral health unit protocol. Continue to promote participation in individual, social and group therapeutic milieu. Continue previously prescribed psychotropic medications at present dosing. Upward titration as necessary and as tolerated. Discharge date per primary team.     Medications Risks/Benefits:    Risks, benefits, and possible side effects of medications explained to Alfonzo Koyanagi and she verbalizes understanding and agreement for treatment.     HISTORY OF PRESENT ILLNESS (HPI)     Josephine Marrero is a 26 y.o., overtly appearing , single female, possessing pertinent psychiatric history of major depressive disorder and unspecified anxiety disorder, medically complicated by asthma, presenting to [facility] behavioral health 3B as a 201 per referral by her outpatient psychiatrist, subsequent to worsening dysphoric mood including depression, depressive signs/symptoms (see below) and anxiety despite appropriate adherence to previously prescribed psychotropic medications including Effexor  mg daily in addition to Wellbutrin  mg daily and the absence of any acute adverse effects. Patient states that throughout the past 2 weeks, her depression has worsened including an increase in crying spells, stating that aforementioned depression has adversely impacted her ability to appropriately accomplish her ADLs. Presently, patient denies passive suicidal ideation, denying homicidal ideation in addition to thoughts of self injury. She admits to depressed and anxious mood, denying instances of panic attack, signs/symptoms of any of/hypomania in addition to signs/symptoms of psychosis. Patient denies signs/symptoms relating to posttraumatic stress disorder, having denied previous instances of abuse/trauma. PSYCHIATRIC REVIEW OF SYSTEMS     Appetite: yes, decreased  Adverse eating: no  Weight changes: no  Insomnia/sleeplessness: yes, increased  Fatigue/anergy: yes, increased  Anhedonia/lack of interest: yes, increased  Attention/concentration: yes, decreased  Psychomotor agitation/retardation: no  Somatic symptoms: no  Anxiety/panic attack: yes  Janae/hypomania: no  Hopelessness/helplessness/worthlessness: yes  Self-injurious behavior/high-risk behavior: no  Suicidal ideation: yes, passive   Homicidal ideation: no  Auditory hallucinations: no  Visual hallucinations: no  Other perceptual disturbances: no  Delusional thinking: no  Obsessive/compulsive symptoms: no    REVIEW OF SYSTEMS   Pertinent items are noted in HPI.     HISTORICAL INFORMATION     Past Psychiatric History:   Past Psychiatric management: Denies previous instances of inpatient behavioral health admission, although admits to present outpatient psychiatric management through [facility] (Dr. Veronica Yo)  Past Suicide attempts/self-injurious behavior: No, denies  Past Violent behavior:, No, denies  Past Psychiatric medications: Yes, Effexor XR and Wellbutrin XL    Substance Abuse History:  I spent time with patient in counseling and education on risk of substance abuse. Assessed him motivation and encouraged patient for treatment. Brief intervention done. Social History       Tobacco History       Smoking Status  Never      Smokeless Tobacco Use  Never      Tobacco Comments  No tobacco/smoke exposure              Alcohol History       Alcohol Use Status  Never              Drug Use       Drug Use Status  Never              Sexual Activity       Sexually Active  Yes Partners  Male Birth Control/Protection  Condom Male, OCP              Activities of Daily Living    Not Asked                 Additional Substance Use Detail       Questions Responses    Problems Due to Past Use of Alcohol? No    Problems Due to Past Use of Substances? No    Cannabis frequency Never used    Comment:  Never used on 10/26/2023     Cocaine frequency Never used    Comment:  Never used on 10/26/2023     Inhalant frequency Never used    Comment:  Never used on 10/26/2023     Hallucinogen frequency Never used    Comment:  Never used on 10/26/2023     Ecstasy frequency Never used    Comment:  Never used on 10/26/2023     Other drug frequency Never used    Comment:  Never used on 10/26/2023     Last reviewed by Dilcia Esquivel LPN on 95/87/1492          I have assessed this patient for substance use within the past 12 months    Family Psychiatric History:   Denies.     Social History:  Education: college graduate res[employer]  Learning Disabilities:  No, denies  Marital history: single  Living arrangement, social support:  Resides with mother. Occupational History: employed as a medical technologist  Functioning Relationships: good support system.   Other Pertinent History: Denies previous legal involvement including access to firearms/weapons    Traumatic History:   Abuse: denies  Other Traumatic Events: denies    OBJECTIVE     Past Medical History:   Diagnosis Date    Asthma exacerbation, mild     last assessed - 06Oct2016    Chronic tonsillitis     Eczema     Fracture of phalanx of right index finger     Menorrhagia     last assessed - [id number]    Mild asthma with acute exacerbation 2/8/2019    Obstructive sleep apnea of child     Orthostatic dizziness     last assessed - 20Nov2015    Premenarchal     Seasonal allergies     Resolved - [id number]    Tinea corporis 5/15/2020     Meds/Allergies   all current active meds have been reviewed, current meds:   Current Facility-Administered Medications   Medication Dose Route Frequency    acetaminophen (TYLENOL) tablet 650 mg  650 mg Oral Q6H PRN    acetaminophen (TYLENOL) tablet 650 mg  650 mg Oral Q4H PRN    acetaminophen (TYLENOL) tablet 975 mg  975 mg Oral Q6H PRN    albuterol (PROVENTIL HFA,VENTOLIN HFA) inhaler 2 puff  2 puff Inhalation Q4H PRN    aluminum-magnesium hydroxide-simethicone (MAALOX) oral suspension 30 mL  30 mL Oral Q4H PRN    benztropine (COGENTIN) injection 1 mg  1 mg Intramuscular Q4H PRN Max 6/day    benztropine (COGENTIN) tablet 1 mg  1 mg Oral Q4H PRN Max 6/day    buPROPion (WELLBUTRIN XL) 24 hr tablet 150 mg  150 mg Oral Daily    hydrOXYzine HCL (ATARAX) tablet 50 mg  50 mg Oral Q6H PRN Max 4/day    Or    diphenhydrAMINE (BENADRYL) injection 50 mg  50 mg Intramuscular Q6H PRN    glycerin-hypromellose- (ARTIFICIAL TEARS) ophthalmic solution 1 drop  1 drop Both Eyes Q3H PRN    hydrOXYzine HCL (ATARAX) tablet 100 mg  100 mg Oral Q6H PRN Max 4/day    Or    LORazepam (ATIVAN) injection 2 mg  2 mg Intramuscular Q6H PRN    hydrOXYzine HCL (ATARAX) tablet 25 mg  25 mg Oral Q6H PRN Max 4/day    melatonin tablet 3 mg  3 mg Oral HS PRN    OLANZapine (ZyPREXA) tablet 5 mg  5 mg Oral Q4H PRN Max 3/day    Or    OLANZapine (ZyPREXA) IM injection 2.5 mg  2.5 mg Intramuscular Q4H PRN Max 3/day    OLANZapine (ZyPREXA) tablet 5 mg  5 mg Oral Q3H PRN Max 3/day    Or    OLANZapine (ZyPREXA) IM injection 5 mg  5 mg Intramuscular Q3H PRN Max 3/day    OLANZapine (ZyPREXA) tablet 2.5 mg  2.5 mg Oral Q4H PRN Max 6/day    polyethylene glycol (MIRALAX) packet 17 g  17 g Oral Daily PRN    senna-docusate sodium (SENOKOT S) 8.6-50 mg per tablet 1 tablet  1 tablet Oral Daily PRN    venlafaxine (EFFEXOR-XR) 24 hr capsule 150 mg  150 mg Oral Daily   , and PTA meds:   Prior to Admission Medications   Prescriptions Last Dose Informant Patient Reported? Taking?    buPROPion (Wellbutrin XL) 150 mg 24 hr tablet 10/25/2023  No Yes   Sig: Take 1 tablet (150 mg) daily   levalbuterol (XOPENEX HFA) 45 mcg/act inhaler Unknown  No No   Sig: Inhale 1-2 puffs every 4 (four) hours as needed for wheezing   levalbuterol (Xopenex) 0.63 mg/3 mL nebulizer solution Unknown  No No   Sig: Take 3 mL (0.63 mg total) by nebulization every 8 (eight) hours as needed for wheezing or shortness of breath   norgestimate-ethinyl estradiol (ORTHO TRI-CYCLEN LO) 0.18/0.215/0.25 MG-25 MCG per tablet Past Week  No Yes   Sig: Take 1 tablet by mouth daily   venlafaxine 150 MG TB24 24 hr tablet   No No   Sig: Take 1 tablet (150 mg total) by mouth daily with breakfast      Facility-Administered Medications Last Administration Doses Remaining   levalbuterol (XOPENEX) inhalation solution 0.63 mg 2/8/2019  6:05 PM         Allergies   Allergen Reactions    Other      Other reaction(s): Asthma    Pollen Extract Allergic Rhinitis and Wheezing       Vital signs in last 24 hours:  Temp:  [97.1 °F (36.2 °C)-98.3 °F (36.8 °C)] 97.1 °F (36.2 °C)  HR:  [] 102  Resp:  [16] 16  BP: ()/(53-62) 97/53  No intake or output data in the 24 hours ending 10/27/23 1333    Screenings:  Nutrition Screening: Nutrition Assessment (completed by Staff): Nutrition  Feeding: Able to feed self  Diet Type: Regular/House  Appetite: Fair  Diet Supplements: None  Nutrition Screen: Nutrition Screen  Have you lost weight recently without trying?: No  How much weight (pounds) have you lost?: 0 lbs  Have you been eating poorly because of a decreased appetite?: (!) Yes  MST SCORE: 1  Stage III-IV pressure ulcer or non-healing wound?: No  Home tube feeding or total parenteral nutrition (TPN)?: No  Appearance of muscle wasting or fat loss?: No   Nutrition Assessment:  Nursing Nutrition Screen  Have you lost weight recently without trying?: No  How much weight (pounds) have you lost?: 0 lbs  Have you been eating poorly because of a decreased appetite?: (!) Yes  MST SCORE: 1  Poor Dental Problems Affecting Oral Intake: No  Eating Habit(s) that may be indicators of an eating disorder such as binging or induced vomiting: No  Stage III-IV pressure ulcer or non-healing wound?: No  Home tube feeding or total parenteral nutrition (TPN)?: No  Appearance of muscle wasting or fat loss?: No    Pain Screening: Pain Screening: Pain Assessment  Pain Assessment Tool: 0-10  Pain Score: 0    Suicide Screening: ED Crisis Suicide Risk Assessment: Suicide Risk Assessment  Violence Risk to Self: Denies ideation within past 6 months  Description of self harming behaviors or thoughts[de-identified] Denies  Protective Factors: The patient has desire to live, The patient does not want to die    C-SSRS Screening (Nursing Assessment - recent): [place] Suicide Severity Rating Scale (Screener/Recent Self-Report)  Calculated C-SSRS Risk Score (Lifetime/Recent): No Risk Indicated  C-SSRS Screening (Nursing Assessment - since last contact): [place] Suicide Severity Rating Scale (Since Last Contact Screener)  1. Wish to be Dead (Since Last Contact): No  2.  Non-Specific Active Suicidal Thoughts (Since Last Contact): No  Calculated C-SSRS Risk Score (Since Last Contact): No Risk Indicated      Laboratory results:  I have personally reviewed all pertinent laboratory/tests results.   Most Recent Labs:   Lab Results   Component Value Date    WBC 3.03 (L) 10/27/2023    RBC 4.42 10/27/2023    HGB 11.6 10/27/2023    HCT 38.1 10/27/2023     10/27/2023    RDW 13.8 10/27/2023    NEUTROABS 1.50 (L) 10/27/2023    SODIUM 140 10/27/2023    K 4.2 10/27/2023     10/27/2023    CO2 28 10/27/2023    BUN 12 10/27/2023    CREATININE 0.88 10/27/2023    GLUC 97 10/27/2023    GLUF 97 10/27/2023    CALCIUM 9.9 10/27/2023    AST 17 10/27/2023    ALT 11 10/27/2023    ALKPHOS 48 10/27/2023    TP 7.4 10/27/2023    ALB 4.2 10/27/2023    TBILI 0.29 10/27/2023    CHOLESTEROL 201 (H) 10/27/2023    HDL 86 10/27/2023    TRIG 118 10/27/2023    LDLCALC 91 10/27/2023    NONHDLC 115 10/27/2023    WPG2DNZFBQWH 3.266 10/27/2023    FREET4 0.98 11/28/2021    T3FREE 2.26 (L) 12/31/2021     Labs in last 72 hours:   Recent Labs     10/27/23  1012   WBC 3.03*   RBC 4.42   HGB 11.6   HCT 38.1      RDW 13.8   NEUTROABS 1.50*   SODIUM 140   K 4.2      CO2 28   BUN 12   CREATININE 0.88   GLUC 97   GLUF 97   CALCIUM 9.9   AST 17   ALT 11   ALKPHOS 48   TP 7.4   ALB 4.2   TBILI 0.29   CHOLESTEROL 201*   HDL 86   TRIG 118   LDLCALC 91   [id number] 3.266     Admission Labs:   Admission on 10/26/2023   Component Date Value    Sodium 10/27/2023 140     Potassium 10/27/2023 4.2     Chloride 10/27/2023 103     CO2 10/27/2023 28     ANION GAP 10/27/2023 9     BUN 10/27/2023 12     Creatinine 10/27/2023 0.88     Glucose 10/27/2023 97     Glucose, Fasting 10/27/2023 97     Calcium 10/27/2023 9.9     AST 10/27/2023 17     ALT 10/27/2023 11     Alkaline Phosphatase 10/27/2023 48     Total Protein 10/27/2023 7.4     Albumin 10/27/2023 4.2     Total Bilirubin 10/27/2023 0.29     eGFR 10/27/2023 93     Magnesium 10/27/2023 2.1     Phosphorus 10/27/2023 2.7     WBC 10/27/2023 3.03 (L)     RBC 10/27/2023 4.42     Hemoglobin 10/27/2023 11.6     Hematocrit 10/27/2023 38.1     MCV 10/27/2023 86     MCH 10/27/2023 26.2 (L)     MCHC 10/27/2023 30.4 (L)     RDW 10/27/2023 13.8     MPV 10/27/2023 9.4     Platelets 02/04/7210 247     nRBC 10/27/2023 0     Neutrophils Relative 10/27/2023 49     Immat GRANS % 10/27/2023 0     Lymphocytes Relative 10/27/2023 39     Monocytes Relative 10/27/2023 10     Eosinophils Relative 10/27/2023 1     Basophils Relative 10/27/2023 1     Neutrophils Absolute 10/27/2023 1.50 (L)     Immature Grans Absolute 10/27/2023 0.00     Lymphocytes Absolute 10/27/2023 1.19     Monocytes Absolute 10/27/2023 0.29     Eosinophils Absolute 10/27/2023 0.03     Basophils Absolute 10/27/2023 0.02     TSH 3RD GENERATON 10/27/2023 3.266     Cholesterol 10/27/2023 201 (H)     Triglycerides 10/27/2023 118     HDL, Direct 10/27/2023 86     LDL Calculated 10/27/2023 91     Non-HDL-Chol (CHOL-HDL) 10/27/2023 115     Ventricular Rate 10/26/2023 94     Atrial Rate 10/26/2023 94     CT Interval 10/26/2023 172     QRSD Interval 10/26/2023 80     QT Interval 10/26/2023 370     QTC Interval 10/26/2023 462     P Axis 10/26/2023 54     QRS [place] 10/26/2023 78     T Wave [place] 10/26/2023 52      Imaging Studies: Refer to pertinent studies if applicable    EKG, Pathology, and Other Studies: Refer to pertinent studies if applicable. UDS negative. Alcohol negative. Pregnancy negative.     Mental Status Evaluation:  Appearance:  age appropriate, casually dressed, and appropriate hygiene, slightly disheveled in hospital attire   Behavior:  Calm and cooperative although tearful at times possessing poor eye contact   Speech:  soft and scant   Mood:  anxious, depressed, and dysthymic   Affect:  constricted and mood-congruent   Language: naming objects and repeating phrases   Thought Process:  goal directed   Thought Content:  no overt obsessions or delusions   Perceptual Disturbances: None not appearing internally preoccupied or distracted   Risk Potential: Suicidal Ideations none, Homicidal Ideations none, and Potential for Aggression No   Sensorium:  person, place, and time/date   Cognition:  recent and remote memory grossly intact   Consciousness:  alert and awake    Attention: attention span appeared shorter than expected for age   Intellect: within normal limits   Fund of Knowledge: vocabulary: Appropriate   Insight:  limited   Judgment: limited   Muscle Strength and Tone: Appears appropriate   Gait/Station: normal gait/station and normal balance   Motor Activity: no abnormal movements     Memory: Short and long term memory fair     Risks / Benefits of Treatment:     Risks, benefits, and possible side effects of medications explained to patient. The patient verbalizes understanding and agreement for treatment. Counseling / Coordination of Care:     Patient's presentation on admission and proposed treatment plan discussed with treatment team.  Diagnosis, medication changes and treatment plan reviewed with patient. Recent stressors discussed with patient. .  Precipitating episode leading to admission reviewed with patient. Importance of medication and treatment compliance reviewed with patient. Inpatient Psychiatric Certification:     Certification: Based upon physical, mental and social evaluations, I certify that inpatient psychiatric services are medically necessary for this patient for a duration of  greater than 2  midnights for the treatment of Current severe episode of major depressive disorder without psychotic features without prior episode ([address])    Code Status: Level 1 - Full Code    Advance Directive and Living Will:        Power of :      POLST:      Note Share: This note was not shared with the patient due to reasonable likelihood of causing patient harm    This note has been constructed using a voice recognition system.     There may be translation, syntax,  or grammatical errors. If you have any questions, please contact the dictating provider. Georgia C. Holter, D.O.

## 2023-10-27 NOTE — TREATMENT PLAN
TREATMENT PLAN REVIEW - [address] [facility] 26 y.o. 2000 female MRN: [id number]    [address] Room / Bed: [facility] 346/[facility] 346-01 Encounter: [id number]          Admit Date/Time:  10/26/2023  3:52 PM    Treatment Team: Attending Provider: Tone Chand MD; Care Manager: June Rendon [facility]; Patient Care Technician: iLnda Aguilar; Patient Care Assistant: iVrgilio Freeman; Licensed Practical Nurse: Verna Antunez LPN; : Radha Araiza; Advanced Practitioner: Jenny Millan, [address] [place] [address]; Patient Care Assistant: Adrienne Garcia    Diagnosis: Principal Problem:    Current severe episode of major depressive disorder without psychotic features without prior episode [facility])  Active Problems:    Medical clearance for psychiatric admission    Anxiety and depression    Mild intermittent asthma without complication    Anxiety disorder      Patient Strengths/Assets: ability for insight, average or above intelligence, capable of independent living, compliant with medication, good physical health, good support system, patient is on a voluntary commitment, patient is willing to work on problems, well educated    Patient Barriers/Limitations: low self esteem    Short Term Goals: decrease in depressive symptoms, decrease in anxiety symptoms, decrease in suicidal thoughts, sleep improvement, improvement in appetite, mood stabilization, increase in group attendance, increase in socialization with peers on the unit, acceptance of need for psychiatric treatment, acceptance of psychiatric medications    Long Term Goals: improvement in depression, improvement in anxiety, resolution of depressive symptoms, stabilization of mood, free of suicidal thoughts, acceptance of need for psychiatric medications, acceptance of need for psychiatric treatment, acceptance of need for psychiatric follow up after discharge, acceptance of psychiatric diagnosis, appropriate interaction with peers, appropriate interaction with family, stable living arrangements upon discharge, establishment of family support upon discharge    Progress Towards Goals: starting psychiatric medications as prescribed, continue psychiatric medications as prescribed    Recommended Treatment: medication management, patient medication education, group therapy, milieu therapy, continued Behavioral Health psychiatric evaluation/assessment process    Treatment Frequency: daily medication monitoring, group and milieu therapy daily, monitoring through interdisciplinary rounds, monitoring through weekly patient care conferences    Expected Discharge Date:  TBD    Discharge Plan: return to previous living arrangement    Treatment Plan Created/Updated By: Noe Colt Holter, DO

## 2023-10-27 NOTE — NURSING NOTE
Pt was isolative to her bed this evening. She appears sad and depressed. Pt gave responses by shaking her head yes/no. Denies SI/HI/AVH. Pt does not have any scheduled night medications. No problems observed or reported. Q 7 min checks maintained for safety.

## 2023-10-27 NOTE — PROGRESS NOTES
Met with Sis Thomson completed the admission self assessment. She is depressed, guarded stating she does not like to talk. Offered her to write her answers which are also vague. 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 She is self deprecating and does not enjoy anything about her life.

## 2023-10-27 NOTE — SOCIAL WORK
Patient Intake   Living Arrangement With her mom   Can patient return home Yes   Address to discharge to [facility] [place]   Patient's Telephone Number [phone]   Patient's e-mail Address [email or web address]   Access to firearms Pt denies   Type of work [employer] - [address] [facility] [employer] grade/year Bachelors Degree - Biology   Marital Status/Children Single, No children   Spirituality/Rastafari None   Transportation Drives self   Preferred [id number] [facility]   Admission Status    Status of admission [address] [place] [address]   Patient History   Stressor/Trigger Increasing depression and crying.  Passive death wish   Treatment History None   Current psychiatrist/therapist VENITA Tapia   Suicide Attempts Denies   Family History of Mental Health Unknown, pt was adopted   ACT/ICM Denies, declined referral   Legal Issues Denies   Substance Abuse UDS: negative  Audit Score: 0  Nicotine/Tobacco: Denies     Trauma/Losses Denies        Releases of Ja Jamie Tapia

## 2023-10-27 NOTE — CONSULTS
[facility]  Consult  Name: Rodney Thompson 26 y.o. female I MRN: [id number]  Unit/Bed#: -01 I Date of Admission: 10/26/2023   Date of Service: 10/27/2023 I Hospital Day: 1    Inpatient consult for Medical Clearance for [address] [address] patient  Consult performed by: KETAN Buckley  Consult ordered by: Igor Montes MD        Assessment/Plan   Medical clearance for psychiatric admission  Assessment & Plan  Patient is medically cleared for admission to [facility] and treatment of underlying psychiatric illness based on available results. Admit labs pending. No acute medical needs. Will follow-up pending labs. Medicine will sign off. Please call with additional questions or concerns     Mild intermittent asthma without complication  Assessment & Plan  No signs of exacerbation   Auto substitute Xopenex INH with Xopenex INH PRN      Anxiety and depression  Assessment & Plan  Expressed increased depression, anxiety, and SI  [facility]  Management per primary service            Recommendations for Discharge:  Irineo Martínez will sign off - please call with questions or concerns. Follow up with PCP upon discharge. Counseling / Coordination of Care Time: 30 minutes. Greater than 50% of total time spent on patient counseling and coordination of care. Collaboration of Care: Were Recommendations Directly Discussed with Primary Treatment Team? - Yes     History of Present Illness:    Rodney Thompson is a 26 y.o. female with a PMH including depression who is originally admitted to the psychiatric service due to worsening depression with SI. We are consulted for medical clearance for psychiatric hospitalization and medical management. Initially, patient presented to [facility] ED after referral from her outpatient psychiatrist. Patient was seen by Crisis in the ED. She expressed feelings of depression, anxiety, SI. She signed a 201 [facility]  Currently, patient is pleasant and cooperative. Review of Systems:    Review of Systems   Constitutional:  Negative for appetite change and chills. HENT:  Negative for congestion and sore throat. Eyes:  Negative for visual disturbance. Respiratory:  Negative for cough and shortness of breath. Cardiovascular:  Negative for chest pain. Gastrointestinal:  Negative for abdominal pain, constipation, diarrhea, nausea and vomiting. Genitourinary:  Negative for difficulty urinating. Musculoskeletal:  Negative for gait problem. Skin:  Negative for wound. Neurological:  Negative for dizziness, light-headedness and headaches. Past Medical and Surgical History:     Past Medical History:   Diagnosis Date    Asthma exacerbation, mild     last assessed - [id number]    Chronic tonsillitis     Eczema     Fracture of phalanx of right index finger     Menorrhagia     last assessed - [id number]    Mild asthma with acute exacerbation 2/8/2019    Obstructive sleep apnea of child     Orthostatic dizziness     last assessed - [id number]    Premenarchal     Seasonal allergies     Resolved - [id number]    Tinea corporis 5/15/2020       Past Surgical History:   Procedure Laterality Date    TONSILLECTOMY AND ADENOIDECTOMY         Meds/Allergies:    all medications and allergies reviewed    Allergies:    Allergies   Allergen Reactions    Other      Other reaction(s): Asthma    Pollen Extract Allergic Rhinitis and Wheezing       Social History:     Marital Status: Single    Substance Use History:   Social History     Substance and Sexual Activity   Alcohol Use Never     Social History     Tobacco Use   Smoking Status Never   Smokeless Tobacco Never   Tobacco Comments    No tobacco/smoke exposure     Social History     Substance and Sexual Activity   Drug Use Never       Family History:    Family History   Adopted: Yes   Family history unknown: Yes       Physical Exam:     Vitals:   Blood Pressure: 97/53 (10/27/23 0759)  Pulse: 102 (10/27/23 0759)  Temperature: (!) 97.1 °F (36.2 °C) (10/27/23 0759)  Temp Source: Temporal (10/27/23 0759)  Respirations: 16 (10/27/23 0759)  Height: 5' 2" (157.5 cm) (10/26/23 1552)  Weight - Scale: 57.1 kg (125 lb 12.8 oz) (10/26/23 1552)  SpO2: 97 % (10/27/23 0759)    Physical Exam  Vitals and nursing note reviewed. Constitutional:       General: She is not in acute distress. Appearance: She is not toxic-appearing or diaphoretic. HENT:      Head: Normocephalic. Mouth/Throat:      Mouth: Mucous membranes are moist.   Eyes:      Conjunctiva/sclera: Conjunctivae normal.   Cardiovascular:      Rate and Rhythm: Normal rate. Pulmonary:      Effort: Pulmonary effort is normal.      Breath sounds: Normal breath sounds. Abdominal:      General: Bowel sounds are normal.      Palpations: Abdomen is soft. Musculoskeletal:         General: Normal range of motion. Cervical back: Normal range of motion. Right lower leg: No edema. Left lower leg: No edema. Skin:     General: Skin is warm and dry. Capillary Refill: Capillary refill takes less than 2 seconds. Neurological:      Mental Status: She is alert and oriented to person, place, and time. Mental status is at baseline. Psychiatric:         Mood and Affect: Mood is depressed. Affect is flat. Speech: Speech normal.         Behavior: Behavior is cooperative. Additional Data:     Lab Results:                     No results found for: "HGBA1C"            Imaging: I have personally reviewed pertinent reports. No orders to display       EKG, Pathology, and Other Studies Reviewed on Admission:   EKG: see above documentation    ** Please Note: This note has been constructed using a voice recognition system.  **

## 2023-10-27 NOTE — NURSING NOTE
Pt awake and visible on the unit, curled up on a chair in the TV room. Minimal interaction with staff or peers, scant, guarded, poor eye contact, very soft spoken. Pt denies SI/HI/AVH at this time. Pt reports depression, appears anxious. Smiling inappropriately throughout encounter. Pt reports her mood is "tired". Pt reports poor sleep. Labs collected, pt tolerated well. Denies any unmet needs. Encouraged pt to attend groups. Q7 minute safety checks maintained. 1215: Pt compliant with first doses of Effexor and Wellbutrin.

## 2023-10-28 PROCEDURE — 99232 SBSQ HOSP IP/OBS MODERATE 35: CPT | Performed by: STUDENT IN AN ORGANIZED HEALTH CARE EDUCATION/TRAINING PROGRAM

## 2023-10-28 RX ORDER — MELATONIN
1000 DAILY
Status: DISCONTINUED | OUTPATIENT
Start: 2023-10-29 | End: 2023-10-30 | Stop reason: HOSPADM

## 2023-10-28 RX ADMIN — VENLAFAXINE HYDROCHLORIDE 150 MG: 150 CAPSULE, EXTENDED RELEASE ORAL at 09:50

## 2023-10-28 RX ADMIN — BUPROPION HYDROCHLORIDE 150 MG: 150 TABLET, EXTENDED RELEASE ORAL at 09:51

## 2023-10-28 NOTE — PLAN OF CARE
Problem: Ineffective Coping  Goal: Participates in unit activities  Description: Interventions:  - Provide therapeutic environment   - Provide required programming   - Redirect inappropriate behaviors   Outcome: Adequate for Discharge     Problem: Depression  Goal: Verbalize thoughts and feelings  Description: Interventions:  - Assess and re-assess patient's level of risk   - Engage patient in 1:1 interactions, daily, for a minimum of 15 minutes   - Encourage patient to express feelings, fears, frustrations, hopes   Outcome: Not Progressing  Goal: Refrain from harming self  Description: Interventions:  - Monitor patient closely, per order   - Supervise medication ingestion, monitor effects and side effects   Outcome: Not Progressing  Goal: Refrain from isolation  Description: Interventions:  - Develop a trusting relationship   - Encourage socialization   Outcome: Not Progressing  Goal: Refrain from self-neglect  Outcome: Not Progressing     Problem: Anxiety  Goal: Anxiety is at manageable level  Description: Interventions:  - Assess and monitor patient's anxiety level. - Monitor for signs and symptoms (heart palpitations, chest pain, shortness of breath, headaches, nausea, feeling jumpy, restlessness, irritable, apprehensive). - Collaborate with interdisciplinary team and initiate plan and interventions as ordered.   - [place] patient to unit/surroundings  - Explain treatment plan  - Encourage participation in care  - Encourage verbalization of concerns/fears  - Identify coping mechanisms  - Assist in developing anxiety-reducing skills  - Administer/offer alternative therapies  - Limit or eliminate stimulants  Outcome: Not Progressing

## 2023-10-28 NOTE — PROGRESS NOTES
Progress Note - Behavioral Health   Anibal Montes 26 y.o. female MRN: [id number]  Unit/Bed#: U 346-01 Encounter: [id number]    Assessment/Plan   Principal Problem:    Current severe episode of major depressive disorder without psychotic features without prior episode ([address])  Active Problems:    Medical clearance for psychiatric admission    Anxiety and depression    Mild intermittent asthma without complication    Anxiety disorder    Recommended Treatment:   Continue to encourage medication changes  Continue to encourage her signing 72 hours notice  Possibly referral for partial program if refused to rescind the 72 hours notice    All current active medications have been reviewed  Encourage group therapy, milieu therapy and occupational therapy  Behavioral Health checks every 7 minutes  Signed 72 hour notice  Medical management per SLIM. Commitment Status: 201  ----------------------------------------      Subjective: Patient discussed in nursing report and overnight notes and charting reviewed. Per nursing report, patient signed 72 hours notice yesterday, she does not feel that being in the hospital would be very helpful for her.   I discussed with her the potential of referring her for partial program but she will need to have time off from her work however she claimed that she can do the partial program before start working as she started working at 3 PM.  Denies SI, HI, AVH    Behavior over the last 24 hours:  unchanged  Sleep: normal  Appetite: normal  Medication side effects: No  ROS: no complaints    Mental Status Evaluation:  Appearance:  casually dressed   Behavior:  demanding   Speech:  normal rate and volume   Mood:  anxious   Affect:  appropriate   Thought Process:  increased rate of thoughts   Associations: intact associations   Thought Content:  ruminating thoughts   Perceptual Disturbances: denies auditory hallucinations when asked, does not appear responding to internal stimuli   Risk Potential: Suicidal ideation - None at present  Homicidal ideation - None at present  Potential for aggression - No   Sensorium:  oriented to person, place, and time/date   Memory:  recent and remote memory grossly intact   Consciousness:  alert and awake   Attention/Concentration: attention span and concentration are age appropriate   Insight:  partial   Judgment: partial   Gait/Station: normal gait/station   Motor Activity: no abnormal movements     Medications: all current active meds have been reviewed, continue current psychiatric medications, and current meds:   Current Facility-Administered Medications   Medication Dose Route Frequency    acetaminophen (TYLENOL) tablet 650 mg  650 mg Oral Q6H PRN    acetaminophen (TYLENOL) tablet 650 mg  650 mg Oral Q4H PRN    acetaminophen (TYLENOL) tablet 975 mg  975 mg Oral Q6H PRN    albuterol (PROVENTIL HFA,VENTOLIN HFA) inhaler 2 puff  2 puff Inhalation Q4H PRN    aluminum-magnesium hydroxide-simethicone (MAALOX) oral suspension 30 mL  30 mL Oral Q4H PRN    benztropine (COGENTIN) injection 1 mg  1 mg Intramuscular Q4H PRN Max 6/day    benztropine (COGENTIN) tablet 1 mg  1 mg Oral Q4H PRN Max 6/day    buPROPion (WELLBUTRIN XL) 24 hr tablet 150 mg  150 mg Oral Daily    [START ON 10/29/2023] cholecalciferol (VITAMIN D3) tablet 1,000 Units  1,000 Units Oral Daily    hydrOXYzine HCL (ATARAX) tablet 50 mg  50 mg Oral Q6H PRN Max 4/day    Or    diphenhydrAMINE (BENADRYL) injection 50 mg  50 mg Intramuscular Q6H PRN    glycerin-hypromellose- (ARTIFICIAL TEARS) ophthalmic solution 1 drop  1 drop Both Eyes Q3H PRN    hydrOXYzine HCL (ATARAX) tablet 100 mg  100 mg Oral Q6H PRN Max 4/day    Or    LORazepam (ATIVAN) injection 2 mg  2 mg Intramuscular Q6H PRN    hydrOXYzine HCL (ATARAX) tablet 25 mg  25 mg Oral Q6H PRN Max 4/day    melatonin tablet 3 mg  3 mg Oral HS PRN    OLANZapine (ZyPREXA) tablet 5 mg  5 mg Oral Q4H PRN Max 3/day    Or    OLANZapine (ZyPREXA) IM injection 2.5 mg 2.5 mg Intramuscular Q4H PRN Max 3/day    OLANZapine (ZyPREXA) tablet 5 mg  5 mg Oral Q3H PRN Max 3/day    Or    OLANZapine (ZyPREXA) IM injection 5 mg  5 mg Intramuscular Q3H PRN Max 3/day    OLANZapine (ZyPREXA) tablet 2.5 mg  2.5 mg Oral Q4H PRN Max 6/day    polyethylene glycol (MIRALAX) packet 17 g  17 g Oral Daily PRN    senna-docusate sodium (SENOKOT S) 8.6-50 mg per tablet 1 tablet  1 tablet Oral Daily PRN    venlafaxine (EFFEXOR-XR) 24 hr capsule 150 mg  150 mg Oral Daily   .   Current Facility-Administered Medications   Medication Dose Route Frequency Provider Last Rate    acetaminophen  650 mg Oral Q6H PRN Kiya Meals, MD      acetaminophen  650 mg Oral Q4H PRN Kiya Meals, MD      acetaminophen  975 mg Oral Q6H PRN Kiya Meals, MD      albuterol  2 puff Inhalation Q4H PRN KETAN Garcia      aluminum-magnesium hydroxide-simethicone  30 mL Oral Q4H PRN Kiya Meals, MD      benztropine  1 mg Intramuscular Q4H PRN Max 6/day Kiya Meals, MD      benztropine  1 mg Oral Q4H PRN Max 6/day Kiya Meals, MD      buPROPion  150 mg Oral Daily Georgia C Holter, DO      [START ON 10/29/2023] cholecalciferol  1,000 Units Oral Daily Magdi Almanza PA-C      hydrOXYzine HCL  50 mg Oral Q6H PRN Max 4/day Kiya Meals, MD      Or    diphenhydrAMINE  50 mg Intramuscular Q6H PRN Kiya Meals, MD      glycerin-hypromellose-  1 drop Both Eyes Q3H PRN Kiya Meals, MD      hydrOXYzine HCL  100 mg Oral Q6H PRN Max 4/day Kiya Meals, MD      Or    LORazepam  2 mg Intramuscular Q6H PRN Kiya Meals, MD      hydrOXYzine HCL  25 mg Oral Q6H PRN Max 4/day Kiya Meals, MD      melatonin  3 mg Oral HS PRN Kiya Meals, MD      OLANZapine  5 mg Oral Q4H PRN Max 3/day Kiya Meals, MD      Or    OLANZapine  2.5 mg Intramuscular Q4H PRN Max 3/day Kiya Meals, MD      OLANZapine  5 mg Oral Q3H PRN Max 3/day Kiya Meals, MD      Or    OLANZapine  5 mg Intramuscular Q3H PRN Max 3/day Kiya Meals, MD      OLANZapine  2.5 mg Oral Q4H PRN Max 6/day Shaka Mendoza MD      polyethylene glycol  17 g Oral Daily PRN Shaka Mendoza MD      senna-docusate sodium  1 tablet Oral Daily PRN Shaka Mendoza MD      venlafaxine  150 mg Oral Daily Georgia C Holter, DO         Labs:  I have personally reviewed all pertinent laboratory/tests results  Most Recent Labs:     Admission Labs:   Admission on 10/26/2023   Component Date Value    Vitamin B-12 10/27/2023 325     Vit D, 25-Hydroxy 10/27/2023 32.5     Syphilis Total Antibody 10/27/2023 Non-reactive     Sodium 10/27/2023 140     Potassium 10/27/2023 4.2     Chloride 10/27/2023 103     CO2 10/27/2023 28     ANION GAP 10/27/2023 9     BUN 10/27/2023 12     Creatinine 10/27/2023 0.88     Glucose 10/27/2023 97     Glucose, Fasting 10/27/2023 97     Calcium 10/27/2023 9.9     AST 10/27/2023 17     ALT 10/27/2023 11     Alkaline Phosphatase 10/27/2023 48     Total Protein 10/27/2023 7.4     Albumin 10/27/2023 4.2     Total Bilirubin 10/27/2023 0.29     eGFR 10/27/2023 93     Magnesium 10/27/2023 2.1     Phosphorus 10/27/2023 2.7     WBC 10/27/2023 3.03 (L)     RBC 10/27/2023 4.42     Hemoglobin 10/27/2023 11.6     Hematocrit 10/27/2023 38.1     MCV 10/27/2023 86     MCH 10/27/2023 26.2 (L)     MCHC 10/27/2023 30.4 (L)     RDW 10/27/2023 13.8     MPV 10/27/2023 9.4     Platelets 48/06/0361 247     nRBC 10/27/2023 0     Neutrophils Relative 10/27/2023 49     Immat GRANS % 10/27/2023 0     Lymphocytes Relative 10/27/2023 39     Monocytes Relative 10/27/2023 10     Eosinophils Relative 10/27/2023 1     Basophils Relative 10/27/2023 1     Neutrophils Absolute 10/27/2023 1.50 (L)     Immature Grans Absolute 10/27/2023 0.00     Lymphocytes Absolute 10/27/2023 1.19     Monocytes Absolute 10/27/2023 0.29     Eosinophils Absolute 10/27/2023 0.03     Basophils Absolute 10/27/2023 0.02     TSH 3RD GENERATON 10/27/2023 3.266     Hemoglobin A1C 10/27/2023 5.4     EAG 10/27/2023 108     Cholesterol 10/27/2023 201 (H)     Triglycerides 10/27/2023 118     HDL, Direct 10/27/2023 86     LDL Calculated 10/27/2023 91     Non-HDL-Chol (CHOL-HDL) 10/27/2023 115     Ventricular Rate 10/26/2023 94     Atrial Rate 10/26/2023 94     MT Interval 10/26/2023 172     QRSD Interval 10/26/2023 80     QT Interval 10/26/2023 370     QTC Interval 10/26/2023 462     P Axis 10/26/2023 54     QRS [place] 10/26/2023 78     T Wave [place] 10/26/2023 52        Progress Toward Goals: improving    Risks / Benefits of Treatment:    Risks, benefits, and possible side effects of medications explained to patient and patient verbalizes understanding and agreement for treatment. Counseling / Coordination of Care: Total floor / unit time spent today 15 minutes. Greater than 50% of total time was spent with the patient and / or family counseling and / or coordination of care. A description of counseling / coordination of care:  Patient's progress discussed with staff in treatment team meeting. Treatment plan, treatment progress and medication changes were reviewed with nursing staff, pharmacy service, and case management in interdisciplinary treatment team meeting. Medications, treatment progress and treatment plan reviewed with patient. Recent stressors including social difficulties, everyday stressors, and ongoing anxiety discussed with patient. Educated on importance of medication and treatment compliance. Reassurance and supportive therapy provided. Encouraged participation in milieu and group therapy on the unit.       José Miguel Goodman MD 10/28/23

## 2023-10-28 NOTE — NURSING NOTE
Pt visible on the unit this evening, quiet and keeps to herself, appears depressed, poor eye contact. Denies psych symptoms at this time. No scheduled medications. Denies any unmet needs. Q7 minute safety checks maintained.

## 2023-10-28 NOTE — PLAN OF CARE
Problem: DISCHARGE PLANNING  Goal: Discharge to home or other facility with appropriate resources  Description: INTERVENTIONS:  - Identify barriers to discharge w/patient and caregiver  - Arrange for needed discharge resources and transportation as appropriate  - Identify discharge learning needs (meds, wound care, etc.)  - Arrange for interpretive services to assist at discharge as needed  - Refer to Case Management Department for coordinating discharge planning if the patient needs post-hospital services based on physician/advanced practitioner order or complex needs related to functional status, cognitive ability, or social support system  Outcome: Not Progressing

## 2023-10-29 PROBLEM — L85.8 KERATOSIS PILARIS: Status: RESOLVED | Noted: 2021-11-22 | Resolved: 2023-10-29

## 2023-10-29 PROBLEM — F32.A ANXIETY AND DEPRESSION: Status: RESOLVED | Noted: 2021-02-22 | Resolved: 2023-10-29

## 2023-10-29 PROBLEM — R10.32 LEFT LOWER QUADRANT ABDOMINAL PAIN: Status: RESOLVED | Noted: 2021-06-25 | Resolved: 2023-10-29

## 2023-10-29 PROBLEM — F41.9 ANXIETY AND DEPRESSION: Status: RESOLVED | Noted: 2021-02-22 | Resolved: 2023-10-29

## 2023-10-29 PROBLEM — L60.9 NAIL ABNORMALITIES: Status: RESOLVED | Noted: 2021-11-22 | Resolved: 2023-10-29

## 2023-10-29 PROBLEM — R79.89 HIGH SERUM CORTISOL: Status: RESOLVED | Noted: 2022-01-19 | Resolved: 2023-10-29

## 2023-10-29 PROBLEM — R53.83 FATIGUE: Status: RESOLVED | Noted: 2018-06-03 | Resolved: 2023-10-29

## 2023-10-29 PROBLEM — Z00.8 MEDICAL CLEARANCE FOR PSYCHIATRIC ADMISSION: Status: RESOLVED | Noted: 2019-06-25 | Resolved: 2023-10-29

## 2023-10-29 PROBLEM — L65.9 THINNING HAIR: Status: RESOLVED | Noted: 2021-11-22 | Resolved: 2023-10-29

## 2023-10-29 PROBLEM — R79.89 ABNORMAL TSH: Status: RESOLVED | Noted: 2022-01-19 | Resolved: 2023-10-29

## 2023-10-29 PROBLEM — K62.5 RECTAL BLEEDING: Status: RESOLVED | Noted: 2021-06-25 | Resolved: 2023-10-29

## 2023-10-29 PROBLEM — J30.2 SEASONAL ALLERGIC RHINITIS: Status: RESOLVED | Noted: 2018-06-03 | Resolved: 2023-10-29

## 2023-10-29 PROCEDURE — 99232 SBSQ HOSP IP/OBS MODERATE 35: CPT | Performed by: STUDENT IN AN ORGANIZED HEALTH CARE EDUCATION/TRAINING PROGRAM

## 2023-10-29 RX ORDER — VENLAFAXINE HYDROCHLORIDE 75 MG/1
75 CAPSULE, EXTENDED RELEASE ORAL DAILY
Status: COMPLETED | OUTPATIENT
Start: 2023-10-29 | End: 2023-10-29

## 2023-10-29 RX ADMIN — CYANOCOBALAMIN TAB 1000 MCG 1000 MCG: 1000 TAB at 09:31

## 2023-10-29 RX ADMIN — VENLAFAXINE HYDROCHLORIDE 150 MG: 150 CAPSULE, EXTENDED RELEASE ORAL at 09:31

## 2023-10-29 RX ADMIN — VENLAFAXINE HYDROCHLORIDE 75 MG: 75 CAPSULE, EXTENDED RELEASE ORAL at 12:47

## 2023-10-29 RX ADMIN — BUPROPION HYDROCHLORIDE 150 MG: 150 TABLET, EXTENDED RELEASE ORAL at 09:31

## 2023-10-29 RX ADMIN — Medication 1000 UNITS: at 09:31

## 2023-10-29 NOTE — PROGRESS NOTES
Progress Note - Behavioral Health   [facility] Shaka brito female MRN: [id number]  Unit/Bed#: U 346-01 Encounter: [id number]    Assessment/Plan   Principal Problem:    Current severe episode of major depressive disorder without psychotic features without prior episode ([address])  Active Problems:    Mild intermittent asthma without complication    Anxiety disorder    Recommended Treatment:   Increase Effexor to 225 mg  Continue to encourage her signing 72 hours notice  Possibly referral for partial program if refused to rescind the 72 hours notice    All current active medications have been reviewed  Encourage group therapy, milieu therapy and occupational therapy  Behavioral Health checks every 7 minutes  Signed 72 hour notice  Medical management per SLIM. Commitment Status: 201  ----------------------------------------      Subjective: Patient discussed in nursing report and overnight notes and charting reviewed. Patient continued to refuse to rescind her 72 hours notice and insisted on being discharged with tomorrow. She was agreeable to increase her Effexor dose to higher level, at the 75 mg dose on 50 mg a day and will start her tomorrow on 225 mg.     Behavior over the last 24 hours:  unchanged  Sleep: normal  Appetite: normal  Medication side effects: No  ROS: no complaints    Mental Status Evaluation:  Appearance:  casually dressed   Behavior:  demanding   Speech:  normal rate and volume   Mood:  anxious   Affect:  appropriate   Thought Process:  increased rate of thoughts   Associations: intact associations   Thought Content:  ruminating thoughts   Perceptual Disturbances: denies auditory hallucinations when asked, does not appear responding to internal stimuli   Risk Potential: Suicidal ideation - None at present  Homicidal ideation - None at present  Potential for aggression - No   Sensorium:  oriented to person, place, and time/date   Memory:  recent and remote memory grossly intact   Consciousness: alert and awake   Attention/Concentration: attention span and concentration are age appropriate   Insight:  partial   Judgment: partial   Gait/Station: normal gait/station   Motor Activity: no abnormal movements     Medications: all current active meds have been reviewed, continue current psychiatric medications, and current meds:   Current Facility-Administered Medications   Medication Dose Route Frequency    acetaminophen (TYLENOL) tablet 650 mg  650 mg Oral Q6H PRN    acetaminophen (TYLENOL) tablet 650 mg  650 mg Oral Q4H PRN    acetaminophen (TYLENOL) tablet 975 mg  975 mg Oral Q6H PRN    albuterol (PROVENTIL HFA,VENTOLIN HFA) inhaler 2 puff  2 puff Inhalation Q4H PRN    aluminum-magnesium hydroxide-simethicone (MAALOX) oral suspension 30 mL  30 mL Oral Q4H PRN    benztropine (COGENTIN) injection 1 mg  1 mg Intramuscular Q4H PRN Max 6/day    benztropine (COGENTIN) tablet 1 mg  1 mg Oral Q4H PRN Max 6/day    buPROPion (WELLBUTRIN XL) 24 hr tablet 150 mg  150 mg Oral Daily    cholecalciferol (VITAMIN D3) tablet 1,000 Units  1,000 Units Oral Daily    cyanocobalamin (VITAMIN B-12) tablet 1,000 mcg  1,000 mcg Oral Daily    hydrOXYzine HCL (ATARAX) tablet 50 mg  50 mg Oral Q6H PRN Max 4/day    Or    diphenhydrAMINE (BENADRYL) injection 50 mg  50 mg Intramuscular Q6H PRN    glycerin-hypromellose- (ARTIFICIAL TEARS) ophthalmic solution 1 drop  1 drop Both Eyes Q3H PRN    hydrOXYzine HCL (ATARAX) tablet 100 mg  100 mg Oral Q6H PRN Max 4/day    Or    LORazepam (ATIVAN) injection 2 mg  2 mg Intramuscular Q6H PRN    hydrOXYzine HCL (ATARAX) tablet 25 mg  25 mg Oral Q6H PRN Max 4/day    melatonin tablet 3 mg  3 mg Oral HS PRN    OLANZapine (ZyPREXA) tablet 5 mg  5 mg Oral Q4H PRN Max 3/day    Or    OLANZapine (ZyPREXA) IM injection 2.5 mg  2.5 mg Intramuscular Q4H PRN Max 3/day    OLANZapine (ZyPREXA) tablet 5 mg  5 mg Oral Q3H PRN Max 3/day    Or    OLANZapine (ZyPREXA) IM injection 5 mg  5 mg Intramuscular Q3H PRN Max 3/day    OLANZapine (ZyPREXA) tablet 2.5 mg  2.5 mg Oral Q4H PRN Max 6/day    polyethylene glycol (MIRALAX) packet 17 g  17 g Oral Daily PRN    senna-docusate sodium (SENOKOT S) 8.6-50 mg per tablet 1 tablet  1 tablet Oral Daily PRN    venlafaxine (EFFEXOR-XR) 24 hr capsule 75 mg  75 mg Oral Daily   .   Current Facility-Administered Medications   Medication Dose Route Frequency Provider Last Rate    acetaminophen  650 mg Oral Q6H PRN Esthela Palomino MD      acetaminophen  650 mg Oral Q4H PRN Esthela Palomino MD      acetaminophen  975 mg Oral Q6H PRN Esthela Palomino MD      albuterol  2 puff Inhalation Q4H PRN KETAN Mistry      aluminum-magnesium hydroxide-simethicone  30 mL Oral Q4H PRN Esthela Palomino MD      benztropine  1 mg Intramuscular Q4H PRN Max 6/day Esthela Palomino MD      benztropine  1 mg Oral Q4H PRN Max 6/day Esthela Palomino MD      buPROPion  150 mg Oral Daily Georgia C Holter, DO      cholecalciferol  1,000 Units Oral Daily Vanessa Correia PA-C      cyanocobalamin  1,000 mcg Oral Daily KETAN Mistry      hydrOXYzine HCL  50 mg Oral Q6H PRN Max 4/day Esthela Palomino MD      Or    diphenhydrAMINE  50 mg Intramuscular Q6H PRN Esthela Palomino MD      glycerin-hypromellose-  1 drop Both Eyes Q3H PRN Esthela Palomino MD      hydrOXYzine HCL  100 mg Oral Q6H PRN Max 4/day Esthela Palomino MD      Or    LORazepam  2 mg Intramuscular Q6H PRN Esthela Palomino MD      hydrOXYzine HCL  25 mg Oral Q6H PRN Max 4/day Esthela Palomino MD      melatonin  3 mg Oral HS PRN Esthela Palomino MD      OLANZapine  5 mg Oral Q4H PRN Max 3/day Esthela Palomino MD      Or    OLANZapine  2.5 mg Intramuscular Q4H PRN Max 3/day Esthela Palomino MD      OLANZapine  5 mg Oral Q3H PRN Max 3/day Esthela Palomino MD      Or    OLANZapine  5 mg Intramuscular Q3H PRN Max 3/day Esthela Palomino MD      OLANZapine  2.5 mg Oral Q4H PRN Max 6/day Esthela Palomino MD      polyethylene glycol  17 g Oral Daily PRN Esthela Palomino MD      senna-docusate sodium  1 tablet Oral Daily PRN Flakito Renteria MD      venlafaxine  75 mg Oral Daily Tamiko James MD         Labs:  I have personally reviewed all pertinent laboratory/tests results  Most Recent Labs:     Admission Labs:   Admission on 10/26/2023   Component Date Value    Vitamin B-12 10/27/2023 325     Vit D, 25-Hydroxy 10/27/2023 32.5     Syphilis Total Antibody 10/27/2023 Non-reactive     Sodium 10/27/2023 140     Potassium 10/27/2023 4.2     Chloride 10/27/2023 103     CO2 10/27/2023 28     ANION GAP 10/27/2023 9     BUN 10/27/2023 12     Creatinine 10/27/2023 0.88     Glucose 10/27/2023 97     Glucose, Fasting 10/27/2023 97     Calcium 10/27/2023 9.9     AST 10/27/2023 17     ALT 10/27/2023 11     Alkaline Phosphatase 10/27/2023 48     Total Protein 10/27/2023 7.4     Albumin 10/27/2023 4.2     Total Bilirubin 10/27/2023 0.29     eGFR 10/27/2023 93     Magnesium 10/27/2023 2.1     Phosphorus 10/27/2023 2.7     WBC 10/27/2023 3.03 (L)     RBC 10/27/2023 4.42     Hemoglobin 10/27/2023 11.6     Hematocrit 10/27/2023 38.1     MCV 10/27/2023 86     MCH 10/27/2023 26.2 (L)     MCHC 10/27/2023 30.4 (L)     RDW 10/27/2023 13.8     MPV 10/27/2023 9.4     Platelets 44/47/0371 247     nRBC 10/27/2023 0     Neutrophils Relative 10/27/2023 49     Immat GRANS % 10/27/2023 0     Lymphocytes Relative 10/27/2023 39     Monocytes Relative 10/27/2023 10     Eosinophils Relative 10/27/2023 1     Basophils Relative 10/27/2023 1     Neutrophils Absolute 10/27/2023 1.50 (L)     Immature Grans Absolute 10/27/2023 0.00     Lymphocytes Absolute 10/27/2023 1.19     Monocytes Absolute 10/27/2023 0.29     Eosinophils Absolute 10/27/2023 0.03     Basophils Absolute 10/27/2023 0.02     TSH 3RD GENERATON 10/27/2023 3.266     Hemoglobin A1C 10/27/2023 5.4     EAG 10/27/2023 108     Cholesterol 10/27/2023 201 (H)     Triglycerides 10/27/2023 118     HDL, Direct 10/27/2023 86     LDL Calculated 10/27/2023 91     Non-HDL-Chol (CHOL-HDL) 10/27/2023 115 Ventricular Rate 10/26/2023 94     Atrial Rate 10/26/2023 94     AR Interval 10/26/2023 172     QRSD Interval 10/26/2023 80     QT Interval 10/26/2023 370     QTC Interval 10/26/2023 462     P Axis 10/26/2023 54     QRS [place] 10/26/2023 78     T Wave [place] 10/26/2023 52        Progress Toward Goals: improving    Risks / Benefits of Treatment:    Risks, benefits, and possible side effects of medications explained to patient and patient verbalizes understanding and agreement for treatment. Counseling / Coordination of Care: Total floor / unit time spent today 15 minutes. Greater than 50% of total time was spent with the patient and / or family counseling and / or coordination of care. A description of counseling / coordination of care:  Patient's progress discussed with staff in treatment team meeting. Treatment plan, treatment progress and medication changes were reviewed with nursing staff, pharmacy service, and case management in interdisciplinary treatment team meeting. Medications, treatment progress and treatment plan reviewed with patient. Recent stressors including social difficulties, everyday stressors, and ongoing anxiety discussed with patient. Educated on importance of medication and treatment compliance. Reassurance and supportive therapy provided. Encouraged participation in milieu and group therapy on the unit.       Johanna Womack MD 10/29/23

## 2023-10-29 NOTE — PLAN OF CARE
Problem: Ineffective Coping  Goal: Participates in unit activities  Description: Interventions:  - Provide therapeutic environment   - Provide required programming   - Redirect inappropriate behaviors   Outcome: Progressing     Problem: Depression  Goal: Verbalize thoughts and feelings  Description: Interventions:  - Assess and re-assess patient's level of risk   - Engage patient in 1:1 interactions, daily, for a minimum of 15 minutes   - Encourage patient to express feelings, fears, frustrations, hopes   Outcome: Progressing  Goal: Refrain from harming self  Description: Interventions:  - Monitor patient closely, per order   - Supervise medication ingestion, monitor effects and side effects   Outcome: Progressing  Goal: Refrain from isolation  Description: Interventions:  - Develop a trusting relationship   - Encourage socialization   Outcome: Progressing  Goal: Refrain from self-neglect  Outcome: Progressing     Problem: Anxiety  Goal: Anxiety is at manageable level  Description: Interventions:  - Assess and monitor patient's anxiety level. - Monitor for signs and symptoms (heart palpitations, chest pain, shortness of breath, headaches, nausea, feeling jumpy, restlessness, irritable, apprehensive). - Collaborate with interdisciplinary team and initiate plan and interventions as ordered.   - [place] patient to unit/surroundings  - Explain treatment plan  - Encourage participation in care  - Encourage verbalization of concerns/fears  - Identify coping mechanisms  - Assist in developing anxiety-reducing skills  - Administer/offer alternative therapies  - Limit or eliminate stimulants  Outcome: Progressing

## 2023-10-29 NOTE — NURSING NOTE
Patient denies AVH/SI/HI. She has a flat affect, appears severely depressed. Patient was offered medications if she felt anxious or upset, or should she become. Patient offered therapeutic communication and reassurance. Patient has primarily been in bed, she was up for breakfast. After breakfast observed in bed with her covers over her head. Patient is compliant with her medications.

## 2023-10-29 NOTE — NURSING NOTE
Patient remained visible on the unit throughout the evening. Often observed reading a book in the common area. Patient observed interacting with others. Cooperative with routine. Reported she feels inpatient is not benefiting her. Support given.

## 2023-10-29 NOTE — NURSING NOTE
Wilber Peterson (Mother) whom ROBERT is in for spoken to by this RN. Patient's mother had asked if the patient can sign herself out as she has a 72 hour notice in signed on 10/27/23 1536. 72 hour notice process explained, and questions answered. Patient/mother feel being admitted inpatient has not been helpful for patient. Patient/Mother was having issues with static on patient phones, so Plated is allowed to use the unit phone to make phone calls.

## 2023-10-30 ENCOUNTER — TELEPHONE (OUTPATIENT)
Dept: PSYCHIATRY | Facility: CLINIC | Age: 23
End: 2023-10-30

## 2023-10-30 VITALS
SYSTOLIC BLOOD PRESSURE: 96 MMHG | DIASTOLIC BLOOD PRESSURE: 51 MMHG | BODY MASS INDEX: 23.52 KG/M2 | RESPIRATION RATE: 16 BRPM | HEART RATE: 105 BPM | HEIGHT: 62 IN | TEMPERATURE: 97 F | WEIGHT: 127.8 LBS | OXYGEN SATURATION: 100 %

## 2023-10-30 PROCEDURE — 99239 HOSP IP/OBS DSCHRG MGMT >30: CPT | Performed by: PSYCHIATRY & NEUROLOGY

## 2023-10-30 RX ORDER — MELATONIN
1000 DAILY
Qty: 30 TABLET | Refills: 0 | Status: SHIPPED | OUTPATIENT
Start: 2023-10-31 | End: 2023-11-30

## 2023-10-30 RX ORDER — VENLAFAXINE HYDROCHLORIDE 75 MG/1
225 CAPSULE, EXTENDED RELEASE ORAL DAILY
Qty: 90 CAPSULE | Refills: 0 | Status: SHIPPED | OUTPATIENT
Start: 2023-10-31 | End: 2023-11-30

## 2023-10-30 RX ORDER — BUPROPION HYDROCHLORIDE 150 MG/1
150 TABLET ORAL DAILY
Qty: 30 TABLET | Refills: 0 | Status: SHIPPED | OUTPATIENT
Start: 2023-10-31 | End: 2023-11-30

## 2023-10-30 RX ADMIN — CYANOCOBALAMIN TAB 1000 MCG 1000 MCG: 1000 TAB at 08:32

## 2023-10-30 RX ADMIN — VENLAFAXINE HYDROCHLORIDE 225 MG: 75 CAPSULE, EXTENDED RELEASE ORAL at 08:32

## 2023-10-30 RX ADMIN — Medication 1000 UNITS: at 08:32

## 2023-10-30 RX ADMIN — BUPROPION HYDROCHLORIDE 150 MG: 150 TABLET, EXTENDED RELEASE ORAL at 08:32

## 2023-10-30 NOTE — PLAN OF CARE
Problem: Ineffective Coping  Goal: Participates in unit activities  Description: Interventions:  - Provide therapeutic environment   - Provide required programming   - Redirect inappropriate behaviors   Outcome: Completed     Problem: Depression  Goal: Verbalize thoughts and feelings  Description: Interventions:  - Assess and re-assess patient's level of risk   - Engage patient in 1:1 interactions, daily, for a minimum of 15 minutes   - Encourage patient to express feelings, fears, frustrations, hopes   Outcome: Completed  Goal: Refrain from harming self  Description: Interventions:  - Monitor patient closely, per order   - Supervise medication ingestion, monitor effects and side effects   Outcome: Completed  Goal: Refrain from isolation  Description: Interventions:  - Develop a trusting relationship   - Encourage socialization   Outcome: Completed  Goal: Refrain from self-neglect  Outcome: Completed     Problem: Anxiety  Goal: Anxiety is at manageable level  Description: Interventions:  - Assess and monitor patient's anxiety level. - Monitor for signs and symptoms (heart palpitations, chest pain, shortness of breath, headaches, nausea, feeling jumpy, restlessness, irritable, apprehensive). - Collaborate with interdisciplinary team and initiate plan and interventions as ordered.   - [place] patient to unit/surroundings  - Explain treatment plan  - Encourage participation in care  - Encourage verbalization of concerns/fears  - Identify coping mechanisms  - Assist in developing anxiety-reducing skills  - Administer/offer alternative therapies  - Limit or eliminate stimulants  Outcome: Completed     Problem: DISCHARGE PLANNING  Goal: Discharge to home or other facility with appropriate resources  Description: INTERVENTIONS:  - Identify barriers to discharge w/patient and caregiver  - Arrange for needed discharge resources and transportation as appropriate  - Identify discharge learning needs (meds, wound care, etc.)  - Arrange for interpretive services to assist at discharge as needed  - Refer to Case Management Department for coordinating discharge planning if the patient needs post-hospital services based on physician/advanced practitioner order or complex needs related to functional status, cognitive ability, or social support system  Outcome: Completed

## 2023-10-30 NOTE — NURSING NOTE
Patient calm with flat effect on approach, denies SI/HI/AVH, patient observed walking milieu reading book. N.O. for one time dose of Effexor 75 mg, 10/30 Effexor increase to 225 mg daily. Patient compliant with medication administration.

## 2023-10-30 NOTE — BH TRANSITION RECORD
Contact Information: If you have any questions, concerns, pended studies, tests and/or procedures, or emergencies regarding your inpatient behavioral health visit. Please contact [facility] behavioral health unit 3B [phone]  and ask to speak to a , nurse or physician. A contact is available 24 hours/ 7 days a week at this number. Summary of Procedures Performed During your Stay:  Below is a list of major procedures performed during your hospital stay and a summary of results:  - Cardiac Procedures/Studies: ECG-NSR. Pending Studies (From admission, onward)      None          Please follow up on the above pending studies with your PCP and/or referring provider.

## 2023-10-30 NOTE — PROGRESS NOTES
OSIEL signed her 72 hour notice and it expires today. Completed her relapse prevention identifying her symptoms, warning signs, coping skills and support people. We reviewed the community support. She denies suicidal ideations at the same time the symptoms that brought her in to the hospital are still present. Her affect is blunt and looks depressed. She has agreed to partial hospitalization as her aftercare and then return to work. Patient is to see the psychiatric team to verify her discharge today.

## 2023-10-30 NOTE — TELEPHONE ENCOUNTER
Mother Starr Moseley left VM. Asking to speak to provider. She would like a leave of absence letter for [employer] for herself. Starr Moseley can be reached at [phone]. [facility] and let her know for FMLA she will have to contact HR via my net (Mom is [employer] employee) and request the FMLA forms for provider to fill out. Starr Moseley stated she submitted the request she is waiting for the papers from . As for Janie Collins she missed work last week as she was inpatient at [employer]. She was out of work 10/25 and she may need to be out the rest of this week, mom is waiting to her from [facility] if Janie Collins is going to be discharged today. She will call back to let me know the exact dates for the letter once she hears from the doctor.

## 2023-10-30 NOTE — SOCIAL WORK
[address]    Name and Date of Birth:  Carol Jimenez 26 y.o. 2000    Date of Referral: October 30, 2023    Presenting Symptoms and Stressors:      Symptoms:  depression, depressive symptoms, and passive death wish  Stressors:  family problems, problems at work, limited support, social difficulties, everyday stressors, and occasional anxiety    Access to Weapons:  No    Smoking Status: denies use    Substance Use:  None    Suicidal Ideation: passive death wish, but denies any active suicidal ideation, intent or plan at present    Homicidal Ideation: None    Depressed Mood: Yes, anhedonia, sadness, hopelessness, helplessness, low motivation, decreased interest, poor concentration, decreased memory, negative thoughts    Janae/Hypomania: None    Psychosis: None    Agitation: No    Appetite Changes: normal appetite    Sleep Disturbance: normal sleep    Diagnoses:  Major Depressive Disorder, single, severe without psychotic features    Current Psychiatrist or Therapist:    Psychiatrist: Dr. Hamzah Higuera  Therapist: None    Do they Require Ambulatory Assistance: No    Communication Assistance: not required     Legal Issues: None        Suzie Torres

## 2023-10-30 NOTE — DISCHARGE INSTR - APPOINTMENTS
Debbi Dillard or Berkley, our Donovan and Lily, will be calling you after your discharge, on the phone number that you provided. They will be available as an additional support, if needed. If you wish to speak with one of them, you may contact Debbi Dillard at [phone] or Dwight Belle at [phone].

## 2023-10-30 NOTE — SOCIAL WORK
RORY spoke with Pt's mom, Patricia Martin, to discuss d/c planning. Mom denies any concerns r/t pt d/c. Mom aware pt's 72hour notice expires today. Mom confirmed pt has no access to firearms and confirmed medications have been secured and pt does not have access. Mom confirmed she will remain present as a support for pt and will be with her following d/c. Mom will  pt at 12pm today.

## 2023-10-30 NOTE — PROGRESS NOTES
10/30/23 0837   Team Meeting   Meeting Type Daily Rounds   Team Members Present   Team Members Present Physician;Nurse;   Physician Team Member [facility] Team Member Finn   Patient/Family Present   Patient Present No   Patient's Family Present No     Pt med/meal compliant. Visible on the unit. Discharge possible today.

## 2023-10-30 NOTE — PLAN OF CARE
Problem: Depression  Goal: Verbalize thoughts and feelings  Description: Interventions:  - Assess and re-assess patient's level of risk   - Engage patient in 1:1 interactions, daily, for a minimum of 15 minutes   - Encourage patient to express feelings, fears, frustrations, hopes   Outcome: Progressing  Goal: Refrain from harming self  Description: Interventions:  - Monitor patient closely, per order   - Supervise medication ingestion, monitor effects and side effects   Outcome: Progressing  Goal: Refrain from isolation  Description: Interventions:  - Develop a trusting relationship   - Encourage socialization   Outcome: Progressing  Goal: Refrain from self-neglect  Outcome: Progressing     Problem: Anxiety  Goal: Anxiety is at manageable level  Description: Interventions:  - Assess and monitor patient's anxiety level. - Monitor for signs and symptoms (heart palpitations, chest pain, shortness of breath, headaches, nausea, feeling jumpy, restlessness, irritable, apprehensive). - Collaborate with interdisciplinary team and initiate plan and interventions as ordered.   - [place] patient to unit/surroundings  - Explain treatment plan  - Encourage participation in care  - Encourage verbalization of concerns/fears  - Identify coping mechanisms  - Assist in developing anxiety-reducing skills  - Administer/offer alternative therapies  - Limit or eliminate stimulants  Outcome: Progressing

## 2023-10-30 NOTE — PROGRESS NOTES
10/27/23 1403   Team Meeting   Meeting Type Tx Team Meeting   Initial Conference Date 10/27/23   Team Members Present   Team Members Present Physician;Nurse;   Physician Team Member [address] Team Member [address] Management Team Member Finn   Patient/Family Present   Patient Present Yes   Patient's Family Present No     Tx plan was reviewed and discussed with Pt. Pt was encouraged to attend groups. Medication was discussed with Pt. Pt signed tx plan.

## 2023-10-30 NOTE — DISCHARGE INSTR - OTHER ORDERS
CRISIS INFORMATION  If you are experiencing a mental health emergency, you may call the [facility] 24 hours a day, 7 days per week at [phone]. In [place], call [phone]. Brenda Odom is a confidential 24/7 telephone support service manned by trained mental health consumers. [facility] provides support, a listening ear and can provide information about available services. [facility] specializes in the concerns of mental health consumers, their families and friends. However, we are also here for anyone who has a mental health concern, is confused about or just doesn't know anything about mental health or where to get information. To reach Brenda Odom, call [phone]. HOW TO GET SUBSTANCE ABUSE HELP:  If you or someone you know has a drug or alcohol problem, there is help:  [address]: [address]: [phone]  An assessment is the first step. In addition to those listed there are other programs available in the area but assessment is best to determine an appropriate level of care. If you DO NOT have Medical Assistance (MA) or [employer], an assessment can be scheduled at one of these providers:  [address]  315 [facility], [address] [place] [address]  [phone]   [facility]  [address] [facility],2 And 3 S Floors., [address]  [address] [facility].  LY, [address]  1900 [facility]  [address] [facility]   Step by [address]  [address] M[address]  [address] [facility]., [facility], [address] [place] [address]  [phone]     If you [address], an assessment can be scheduled at one of these providers:  [facility]  [facility]., [facility], [address] [place] [address]  [address] [facility] [address], 350 [facility]  150 [facility] D&A Intake Unit  [address] 1113 [facility]., 1st Floor, [facility], [place]  [phone]  1 [facility]), [address] [facility]  [address],2 And 3 S Floors., [facility], 350 [facility]  [address] [facility]. [facility], [address] [place] [address] Road  [phone]   NET ([address])  90 [facility] 1801 [facility], [facility], [facility]  [address]  [address]   Step by 112 [address] [facility], [address] [place] [address]  [address] [facility] [place]., [facility], [address] [place] [address]  1002 [facility] [address] [facility], [address] [place] [address]  [phone]     If you [facility] [facility], an assessment can be scheduled at one of these providers. Please contact these Providers to determine if they are in your network plan:  [facility] D&A Intake Unit  [address] 1113 [facility], 1st Floor, [facility], [facility]  1700 [facility],2 And 3 S Floors., [facility], [address] [place] [address]  [phone]   223 [facility]. [facility], [address] [place] [address] Road  [phone]   NET ([address])  90 [facility]  1801 [facility], [facility], [facility]  [address]  1409 [address] [place] [address] 301 N [facility]., [facility], [address]

## 2023-10-30 NOTE — NURSING NOTE
Pt denies SI/HI/AH/VH. Pt reports depression and anxiety. Medication and meal compliant. Isolative to self. Pt appears depressed at times. Pt stated at med pass " will I have to take these when I leave". Informed Pt that her Dr would prescribe medication at discharge and that it is important for her to stay compliant with her medication regimen. No further concerns as of present. Plan of care ongoing.

## 2023-10-30 NOTE — DISCHARGE SUMMARY
Discharge Summary - Juan 26 y.o. female MRN: [id number]  Unit/Bed#: [address] 346-01 Encounter: [id number]     Admission Date: 10/26/2023         Discharge Date: 10/30/2023    Attending Psychiatrist: Henry Anthony MD    Reason for Admission/HPI:     Per HPI from admission H&P obtained by Dr. Sammi Morales on 10/27/2023:    "Oralia Mcdowell is a 26 y.o., overtly appearing , single female, possessing pertinent psychiatric history of major depressive disorder and unspecified anxiety disorder, medically complicated by asthma, presenting to [place] inpatient behavioral health  as a 201 per referral by her outpatient psychiatrist, subsequent to worsening dysphoric mood including depression, depressive signs/symptoms (see below) and anxiety despite appropriate adherence to previously prescribed psychotropic medications including Effexor  mg daily in addition to Wellbutrin  mg daily and the absence of any acute adverse effects. Patient states that throughout the past 2 weeks, her depression has worsened including an increase in crying spells, stating that aforementioned depression has adversely impacted her ability to appropriately accomplish her ADLs. Presently, patient denies passive suicidal ideation, denying homicidal ideation in addition to thoughts of self injury. She admits to depressed and anxious mood, denying instances of panic attack, signs/symptoms of any of/hypomania in addition to signs/symptoms of psychosis.   Patient denies signs/symptoms relating to posttraumatic stress disorder, having denied previous instances of abuse/trauma."      Social History       Tobacco History       Smoking Status  Never      Smokeless Tobacco Use  Never      Tobacco Comments  No tobacco/smoke exposure              Alcohol History       Alcohol Use Status  Never              Drug Use       Drug Use Status  Never              Sexual Activity Sexually Active  Yes Partners  Male Birth Control/Protection  Condom Male, OCP              Activities of Daily Living    Not Asked                 Additional Substance Use Detail       Questions Responses    Problems Due to Past Use of Alcohol? No    Problems Due to Past Use of Substances? No    Cannabis frequency Never used    Comment:  Never used on 10/26/2023     Cocaine frequency Never used    Comment:  Never used on 10/26/2023     Inhalant frequency Never used    Comment:  Never used on 10/26/2023     Hallucinogen frequency Never used    Comment:  Never used on 10/26/2023     Ecstasy frequency Never used    Comment:  Never used on 10/26/2023     Other drug frequency Never used    Comment:  Never used on 10/26/2023     Last reviewed by Pat Mtz LPN on 95/02/8386            Past Medical History:   Diagnosis Date    Asthma exacerbation, mild     last assessed - 06Oct2016    Chronic tonsillitis     Eczema     Fracture of phalanx of right index finger     Menorrhagia     last assessed - [id number]    Mild asthma with acute exacerbation 2/8/2019    Obstructive sleep apnea of child     Orthostatic dizziness     last assessed - 20Nov2015    Premenarchal     Seasonal allergies     Resolved - [id number]    Tinea corporis 5/15/2020     Past Surgical History:   Procedure Laterality Date    TONSILLECTOMY AND ADENOIDECTOMY         Medications: All current active medications have been reviewed. Medications prior to admission:    Prior to Admission Medications   Prescriptions Last Dose Informant Patient Reported? Taking?    buPROPion (Wellbutrin XL) 150 mg 24 hr tablet 10/25/2023  No Yes   Sig: Take 1 tablet (150 mg) daily   levalbuterol (XOPENEX HFA) 45 mcg/act inhaler Unknown  No No   Sig: Inhale 1-2 puffs every 4 (four) hours as needed for wheezing   levalbuterol (Xopenex) 0.63 mg/3 mL nebulizer solution Unknown  No No   Sig: Take 3 mL (0.63 mg total) by nebulization every 8 (eight) hours as needed for wheezing or shortness of breath   norgestimate-ethinyl estradiol (ORTHO TRI-CYCLEN LO) 0.18/0.215/0.25 MG-25 MCG per tablet Past Week  No Yes   Sig: Take 1 tablet by mouth daily   venlafaxine 150 MG TB24 24 hr tablet   No No   Sig: Take 1 tablet (150 mg total) by mouth daily with breakfast      Facility-Administered Medications Last Administration Doses Remaining   levalbuterol (XOPENEX) inhalation solution 0.63 mg 2/8/2019  6:05 PM           Allergies: Allergies   Allergen Reactions    Other      Other reaction(s): Asthma    Pollen Extract Allergic Rhinitis and Wheezing       Objective     Vital signs in last 24 hours:    Temp:  [97 °F (36.1 °C)-97.6 °F (36.4 °C)] 97 °F (36.1 °C)  HR:  [120-125] 120  Resp:  [16] 16  BP: ()/(51-56) 96/51    No intake or output data in the 24 hours ending 10/30/23 [address]:     Krystina Hodge was admitted to the inpatient psychiatric unit and started on Behavioral Health checks every 7 minutes. During the hospitalization she was encouraged to attend individual therapy, group therapy, milieu therapy and occupational therapy. Psychiatric medications were adjusted over the hospital stay. To address depression, Krystina Hodge was treated with antidepressant Effexor XR and Wellbutrin XL. Medication doses were adjusted during the hospital course. Wellbutrin XL was continued at 150mg PO daily . Effexor XR was titrated to 225mg PO daily . Prior to beginning of treatment medications risks and benefits and possible side effects including risk of suicidality and serotonin syndrome related to treatment with antidepressants were reviewed with Krystina Hodge. She verbalized understanding and agreement for treatment. Upon admission Krystina Hodge was seen by medical service for medical clearance for inpatient treatment and medical follow up. Krystina Hodge was admitted to the unit on 10/26/2023 and then signed a 72 hour notice for discharge on 10/27/2023 at 1536.   Upon admission Krystina Hodge was still feeling depressed but did not express any acute suicidal or homicidal ideation. With adjustment of medications and therapeutic milieu her symptoms continued. At the end of treatment Delfina Bills was stable. Her mood was stable at the time of discharge. Delfina Bills denied suicidal ideation, intent or plan at the time of discharge and denied homicidal ideation, intent or plan at the time of discharge. There was no overt psychosis at the time of discharge. Delfina Bills was participating appropriately in milieu at the time of discharge. Behavior was appropriate on the unit at the time of discharge. Delfina Bills signed 72 hour notice and requested discharge. At the time of the 72 hour notice expiration Delfina Bills had no criteria for involuntary commitment and denied any suicidal or homicidal ideation. The outpatient follow up with [facility] at [address] [place] [address] and psychiatrist Dr. Tramaine Flores  was arranged by the unit  upon discharge. Mental Status at Time of Discharge:     Appearance: casually dressed, appears consistent with stated age, normal grooming  Motor: no psychomotor disturbances, no gait abnormalities  Behavior: calm, cooperative  Speech: normal rate, volume  Mood: "fine"  Affect: mood congruent  Thought Process: organized, linear, and goal-oriented; intact associations  Thought Content: denies any delusional material, no preoccupation  Perception: denies any auditory or visual hallucinations, denies other perceptual disturbances  Risk Potential: denies suicidal ideation, plan, or intent.  Denies homicidal ideation  Sensorium: Oriented to person, place, time, and situation  Cognition: cognitive ability appears intact but was not quantitatively tested  Consciousness: alert and awake  Attention/Concentration: able to focus without difficulty, attention and concentration are age appropriate  Insight: intact  Judgement: intact    Admission Diagnosis:    Principal Problem:    Current severe episode of major depressive disorder without psychotic features without prior episode ([address])  Active Problems:    Mild intermittent asthma without complication    Anxiety disorder      Discharge Diagnosis:     Principal Problem:    Current severe episode of major depressive disorder without psychotic features without prior episode ([address])  Active Problems:    Mild intermittent asthma without complication    Anxiety disorder  Resolved Problems:    Medical clearance for psychiatric admission    Anxiety and depression      Lab Results: I have personally reviewed all pertinent laboratory/tests results. Most Recent Labs:   Lab Results   Component Value Date    WBC 3.03 (L) 10/27/2023    RBC 4.42 10/27/2023    HGB 11.6 10/27/2023    HCT 38.1 10/27/2023     10/27/2023    RDW 13.8 10/27/2023    NEUTROABS 1.50 (L) 10/27/2023    SODIUM 140 10/27/2023    K 4.2 10/27/2023     10/27/2023    CO2 28 10/27/2023    BUN 12 10/27/2023    CREATININE 0.88 10/27/2023    GLUC 97 10/27/2023    GLUF 97 10/27/2023    CALCIUM 9.9 10/27/2023    AST 17 10/27/2023    ALT 11 10/27/2023    ALKPHOS 48 10/27/2023    TP 7.4 10/27/2023    ALB 4.2 10/27/2023    TBILI 0.29 10/27/2023    CHOLESTEROL 201 (H) 10/27/2023    HDL 86 10/27/2023    TRIG 118 10/27/2023    LDLCALC 91 10/27/2023    NONHDLC 115 10/27/2023    ADI8WPBEXRGX 3.266 10/27/2023    FREET4 0.98 11/28/2021    T3FREE 2.26 (L) 12/31/2021    HGBA1C 5.4 10/27/2023     10/27/2023       Discharge Medications:    See after visit summary for all reconciled discharge medications provided to patient and family. Discharge instructions/Information to patient and family:     See after visit summary for information provided to patient and family. Provisions for Follow-Up Care:    See after visit summary for information related to follow-up care and any pertinent home health orders. Discharge Statement:    I spent 35 minutes discharging the patient.  This time was spent on the day of discharge. I had direct contact with the patient on the day of discharge. Additional documentation is required if more than 30 minutes were spent on discharge:    I reviewed with [facility] importance of compliance with medications and outpatient treatment after discharge. I discussed the medication regimen and possible side effects of the medications with [facility] prior to discharge. At the time of discharge she was tolerating psychiatric medications. I discussed outpatient follow up with [facility]. I reviewed with [facility] crisis plan and safety plan upon discharge. [facility] was competent to understand risks and benefits of withholding information and risks and benefits of her actions. [facility] signed 72 hour notice and requested discharge. At the time of the 72 hour notice expiration she had no criteria for involuntary commitment and denied any suicidal or homicidal ideation.     Discharge on Two Antipsychotic Medications : No    [address] 10/30/23

## 2023-10-30 NOTE — SOCIAL WORK
Pt to D/C today. Pt denies SI/HI/AVH. Pt oriented x3. Pt to d/c to her mother's home and her mother will  upon discharge. Pt to follow up with [facility] on 11/1. Scripts sent to preferred pharmacy.      Discharge Address: [facility], [facility]  Phone: [phone]

## 2023-10-30 NOTE — TELEPHONE ENCOUNTER
Mother left a VM:  Joaquin Aguilar did get a note for return to work. Mother is completing paperwork for FMLA and will get it to the office.

## 2023-10-30 NOTE — NURSING NOTE
Pt awoke and completed morning routines. AVS printed and reviewed with Pt. Pt verbalized understanding of discharge instructions and agreed to be compliant with medication regimen. Pt discharged with all of her belongings to return home with her Mother at 11:45.

## 2023-10-31 NOTE — TELEPHONE ENCOUNTER
Provider received message and mail regarding FMLA paperwork for parent. Provider reviewed with supervising attending and clinical staff, paperwork for mother's FMLA would need to be completed by mother's providers. Communicated update to patient's mother, Suzy Castleman ([phone]). Patient's mother with questions regarding aftercare for patient - no ROBERT on file for mother. Received information from mother regarding patient's aftercare questions and instructed her to notify patient to call provider at earliest convenience (patient was at work and unavailable to talk with provider). Patient previously made aware of 24/7 coverage through [address] [place] [address] service line.

## 2023-11-01 ENCOUNTER — TELEPHONE (OUTPATIENT)
Dept: PSYCHIATRY | Facility: CLINIC | Age: 23
End: 2023-11-01

## 2023-11-01 NOTE — TELEPHONE ENCOUNTER
Received VM from [facility], she requested a call back from provider. She stated she would like to speak to her about a private matter. She only wants to speak to provider.     She can be reached at [phone]

## 2023-11-01 NOTE — TELEPHONE ENCOUNTER
Patient LVM cancelling their 11/2 @8am new patient appointment due to her psychiatrist recommending so. Writer spoke with patient and informed them the appointment had been cancelled.

## 2023-11-01 NOTE — TELEPHONE ENCOUNTER
Received VM from mother Kyle Gallego, she stated that provider is under impression that the FMLA forms can not be completed by her and should be completed by PCP. Kyle Gallego stated that is not true, if provider looks at forms on section 3 first line it states that provider can complete stating Kyle Gallego will be providing care for Ivonne Hannon. She is requesting a call back from the provider.

## 2023-11-01 NOTE — TELEPHONE ENCOUNTER
Received an in-basket message from Gaviota Zuñiga, regarding rescheduling NP appt with Lilia Poe due to patient starting PHP. In-basket message has been forwarded to Scheduling Management MR at this time.

## 2023-11-02 NOTE — PSYCH
Assessment/Plan:      There are no diagnoses linked to this encounter. 1. Current severe episode of major depressive disorder without psychotic features without prior episode (HCC)             Subjective:     Patient ID: Han Padilla 26 y.o. female Pronouns She/her/hers    HPI:     Per Dr. Justin Abbott MD: Han Padilla is a 26 y.o. female with depression, anxiety, asthma. referred by [facility] psychiatry unit where she was admitted as a voluntary commitment because has increased depression, increased anxiety, feeling hopeless and helpless, sleep disturbances, appetite disturbances, crying spells and difficulties with ADL. She has been feeling more depressed and anxious despite that she has been compliant with her medications. Onset of symptoms was  a few weeks ago with gradually worsening course since that time. Psychosocial Stressors: family. She stated that she has a history of depression and anxiety for a long time, she does not enjoy things any longer she has no motivation, she does not go out with her friends as before. She  still working and she likes her job. She saw a therapist when she was a child when her parents  but no other history of therapy. She complies with treatment. She feels her mother is supportive. Philemon Kawasaki feels depressed, poor eye contact, anhedonia, feels hopeless, has  sleep disturbances and decreased energy. She has fleeting suicidal thoughts without plan or intent, she denies any hallucinations or paranoid thinking. She denies any history of manic episodes. Her PHQ-9 is 18. Per this writer: Han Padilla is a 26year old female referred by [address] [place] [address] after she was admitted from 10/26/2023 thru 10/30/2023 due to worsening depression, lack of motivation, decrease in ADLs, and worsening anxiety. During the interview, Krystina Hodge was guarded, flat, and constricted in her emotions.  Edil Holguin Bruce Huddleston is a single, overtly appearing [place] female, only child, residing with her mother, [place]. OSIEL was adopted and does not know her biological parents or any past histories. OSIEL reported her adoptive parents  when she was about 7 or 8 and has no relationship with her adoptive father. She stated she really did not want to come to [facility]. 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 She also recently graduated with her BA in Biology. She likes her job. . She did share with this writer her relationship with her mother "has been through the ringer, but now it's good." She reported growing up she would sneak out of the house, hook up with random people, and has sent explicit photos to older men. She shared that her mother caught her about 3 times. She showers about 2 - 3 times a week, and doesn't consistently brush her teeth. She reports she sleeps too much and has a decreased appetite and food intake. Lety's symptoms include: breathing feeling labored, lack of motivation/energy, anhedonia, sometimes feeling shaky, and passive SI without plan or intent. Denies any hallucinations and paranoid thinking. Reports no substance use or caffeine intake. She wants to find medications that are best for her. Per aLura Pryor: "I don't have a good self-image," "I'm adopted if that matters," "I don't know, I don't really have any interests in anything," "I was just hospitalized and they recommended this was the best course of action," and "I don't even know when this started."      Strengths: "Listening"      Reason for evaluation and partial hospitalization as an alternative to inpatient hospitalization PHP is medically necessary to prevent rehospitalization as Laura Pryor transitions from IP to OP level of care.  Milieu therapy to monitor for medication needs, provide wellness tools education and offer opportunity to share and connect to others. Group therapy, case management, psychiatric medication management, family contact and UR as indicated. ELOS 10 treatment days. Previous Psychiatric/psychological treatment/year:     Past Psychiatric History:       Past Inpatient Psychiatric Treatment:   In Patient this was her first inpatient psych admission  Past Outpatient Psychiatric Treatment:    She follows with [facility]  Past Suicide Attempts:              no  Past Violent Behavior:              no  Past Psychiatric Medication Trials:              Effexor XR and Wellbutrin XL      Outpatient and/or Partial and Other [employer] Used (CTT, ICM, VNA): Inpatient [facility] 10/26/2023 - 10/30/2023    PSYCHIATRIST:  Dr. Mary Ivan, DO  [address], [address] [place] [address]  PH: [phone]    THERAPIST:  Deyanne Riedel, LCSW [facility] [place]  [address] [facility], [address] [place] [address]  PH: [phone]  F: [phone]       Problem Assessment:     SOCIAL/VOCATION:  Family Constellation (include parents, relationship with each and pertinent Psych/Medical History):     Family History   Adopted: Yes   Family history unknown: Yes          Mother: Aruna Gerber, 61years old. Works in a Lab for [employer]   Father: Jen Tang - no relationship  Other: 4 cats and 1 dog    Who is the person you relate to best "no one". Carla Arredondo lives with Mother, Aruna Gerber. Legal Guardian (for individuals under 25): n/a  Family Factors impacting discharge planning (for individuals under 18): n/a    Domestic Violence: n/a    Trauma/Abuse History: n/a     Additional Comments related to family/relationships/peer support: Reports she has great supports - mother, aunt, cousin and friends. Signed a ROBERT for her mother as an emergency contact. School or Work History (strengths/limitations/needs): Graduated from [employer] with a [address] [place] [address].  Works as a Medical Technologist    Their highest grade level achieved was [employer] history includes: n/a    Financial status includes : n/a     LEISURE ASSESSMENT (Include past and present hobbies/interests and level of involvement (Ex: Group/Club Affiliations): Likes to be in nature  Their primary language is [place]. Preferred language is [place]. Ethnic considerations are . Religions affiliations and level of involvement None . FUNCTIONAL STATUS: There has been a recent change in the patient's ability to do the following: does not need van service    Level of Assistance Needed/By Whom?: [address] [address]  learns best by  reading, listening, and demonstration    SUBSTANCE ABUSE ASSESSMENT: no substance abuse    Do you currently smoke? No Offered smoking cessation? Does not smoke    Substance/Route/Age/Amount/Frequency/Las Use: n/a    DETOX HISTORY:  n/a    Previous detox/rehab treatment: n/a    HEALTH ASSESSMENT: no nausea, no vomiting, and no referral to PCP needed    Primary Care Physician:   Amada Henao MD  [address] [place] [address]  [phone]    If None on file providers offered: One file  Date of Last Physical: "unknown" if not within the last year, one has been recommended:Yes    NUTRITION SCREENING:  Do you have any food allergies: No   Weight loss or gain of 10 pounds or more in the last 3 months: No  Decrease in appetite and/or food intake: Yes  Dental issues impacting nutrition: No  Binging or restricting patterns: No  Past treatment for an eating disorder: No  Level of nutrition needs: Yes = 1 point;  No = 0   Total: 1  none (0)- low (1-3) - moderate (4) - severe (5)   Action plan if moderate to severe: Referral to:N\A      LEGAL: No Mental Health Advance Directive or Power of  on file    Risk Assessment:   The following ratings are based on my interview(s) with Bennye Or over the last 30 minutes    Risk of Harm to Self:   Demographic risk factors include never  or  status and age: young adult (15-24)  Historical Risk Factors include  recent IP stay from 10/26/2023 thru 10/30/2023  Recent Specific Risk Factors include passive death wishes, unable to visualize a realistic positive future, worries about finances or work, and diagnosis of depression     Risk of Harm to Others:   Demographic Risk Factors include [age] years of age  Historical Risk Factors include  n/a  Recent Specific Risk Factors include multiple stressors    Access to Weapons:   Traci Hammond has access to the following weapons: NONE. The following steps have been taken to ensure weapons are properly secured: NONE    Based on the above information, the client presents the following risk of harm to self or others:  low    The following interventions are recommended:   no intervention changes    Notes regarding this Risk Assessment: Provided local Crisis Number to [facility] and Peer/Warm line to [facility]. [address] [place] [address] number also provided. Review Of Systems:     Mood Depression   Behavior Normal    Thought Content Normal   General Sleep Disturbances and Decreased Functioning   Personality Normal   Other Psych Symptoms Normal   Constitutional Negative   ENT Negative   Cardiovascular Negative   Respiratory Negative   Gastrointestinal Negative   Genitourinary Negative   Musculoskeletal Negative   Integumentary Negative   Neurological Negative   Endocrine Normal    Other Symptoms Normal        Mental status:  Appearance calm and cooperative , adequate hygiene and grooming, poor eye contact , and Launie Alberto was wearing a t-shirt and pants, white sneakers. She has a nose ring, glasses, a dark brown/caramel ombre hair.    Mood depressed, apathetic, and uncertain   Affect affect was flat and affect was constricted   Speech decreased volume and speech soft   Thought Processes coherent/organized and normal thought processes   Hallucinations no hallucinations present    Thought Content no delusions   Abnormal Thoughts passive/fleeting thoughts of suicide and no homicidal thoughts    Orientation  oriented to person and place and time   Remote Memory short term memory intact and long term memory intact   Attention Span concentration intact   Intellect Appears to be of [address] displays adequate knowledge of current events, adequate fund of knowledge regarding past history, and adequate fund of knowledge regarding vocabulary    Insight Insight intact   Judgement judgment was impaired   Muscle Strength Muscle strength and tone were normal and Normal gait    Language no difficulty naming common objects, no difficulty repeating a phrase , and no difficulty writing a sentence    Pain none   Pain Scale 0       DSM:     1. Current severe episode of major depressive disorder without psychotic features without prior episode ([address])              Plan:  Admit to PHP. Group Therapy, Case Management, Med Management, UR and family contact as indicated. ELOS 10 treatment Days. Refer to OP psychiatry and therapy, if needed.      Anticipated aftercare plan: OP Care - Continue with therapy; schedule with Francis Guido and psychiatry; Dr. Hamzah Higuera

## 2023-11-02 NOTE — TELEPHONE ENCOUNTER
Provider received message that patient's mother had additional questions regarding FMLA paperwork for parent. Contacted patient's mother, Carlton Herediar ([phone]), to discuss questions. Communicated to patient's mother that provider is unable to complete FMLA paperwork for parent at this time. Recommended parent to discuss with their HR/FMLA representative best next steps for the parent's leave. No ROBERT is signed for mother to be able to discuss case specifics with parent. Patient and patient's parent previously made aware of 24/7 coverage through [address] [place] [address] service line.

## 2023-11-02 NOTE — TELEPHONE ENCOUNTER
Addending to include clarification and recommendation that patient be re-scheduled for individual psychotherapy at conclusion of PHP program.    Thank you,  Jace Stones, DO

## 2023-11-02 NOTE — TELEPHONE ENCOUNTER
Mother Piyush Strong left  asking if [facility] papers have been completed for her.   She can be reached at [phone]

## 2023-11-03 ENCOUNTER — OFFICE VISIT (OUTPATIENT)
Dept: PSYCHOLOGY | Facility: CLINIC | Age: 23
End: 2023-11-03
Payer: COMMERCIAL

## 2023-11-03 ENCOUNTER — OFFICE VISIT (OUTPATIENT)
Dept: PSYCHIATRY | Facility: CLINIC | Age: 23
End: 2023-11-03
Payer: COMMERCIAL

## 2023-11-03 VITALS
BODY MASS INDEX: 23.81 KG/M2 | SYSTOLIC BLOOD PRESSURE: 106 MMHG | HEART RATE: 93 BPM | WEIGHT: 129.4 LBS | DIASTOLIC BLOOD PRESSURE: 73 MMHG | RESPIRATION RATE: 16 BRPM | HEIGHT: 62 IN

## 2023-11-03 DIAGNOSIS — F32.2 CURRENT SEVERE EPISODE OF MAJOR DEPRESSIVE DISORDER WITHOUT PSYCHOTIC FEATURES WITHOUT PRIOR EPISODE (HCC): Primary | ICD-10-CM

## 2023-11-03 PROCEDURE — 90832 PSYTX W PT 30 MINUTES: CPT

## 2023-11-03 PROCEDURE — 90792 PSYCH DIAG EVAL W/MED SRVCS: CPT | Performed by: PSYCHIATRY & NEUROLOGY

## 2023-11-03 PROCEDURE — G0176 OPPS/PHP;ACTIVITY THERAPY: HCPCS

## 2023-11-03 PROCEDURE — G0410 GRP PSYCH PARTIAL HOSP 45-50: HCPCS

## 2023-11-03 PROCEDURE — 90791 PSYCH DIAGNOSTIC EVALUATION: CPT

## 2023-11-03 NOTE — PSYCH
Subjective:    Patient ID: Temitope Harris is a 26 y.o. female      [facility] Clinical Progress Notes      Specialized Services Documentation  Therapist must complete separate progress note for each specific clinical activity in which the individual participated during the day. Education Therapy   [id number]  Temitope Harris was excused due to intake during check in and goal review. [id number]  Temitope Harris engaged throughout the treatment day. Was engaged in learning related to Illness, Medication, Aftercare and Wellness Tools. Staff utilized Verbal, Written, A/V and Demonstration teaching methods. Temitope Harris shared area of learning and set a goal for outside of program to not sleep all weekend. Tx Plan Objective: 1.1, 1.2, 1.4, Therapist: Benjy Guajardo    Group Psychotherapy  [id number] Temitope Harris was excused due to intake during the wellness assessment, which evaluates progress on several different areas of wellness/wellbeing: physical, emotional, cognitive, vocational, social and spiritual. Clients rated their progress and discussed areas that need work. By completing and discussing areas of progress and challenges, members are connected and reminded that, in their mental health struggle, they are not alone. Topics of discussion revolved around positive experiences within each area of wellness as well as the challenging aspects to wellness within their past week. Not yet able to note progress towards goals. Continue with psychotherapy to encourage the development and practice of reflecting on their mental health journey to alleviate symptoms and support wellness.   Tx Plan Objective N/A Therapist: Benjy Guajardo

## 2023-11-03 NOTE — PSYCH
This note was not shared with the patient due to reasonable likelihood of causing patient harm     Visit Time    Visit Start Time: 9:50  Visit Stop Time: 10:30   Total Visit Duration:  40 minutes    Reason for visit:   Chief Complaint   Patient presents with    Anxiety    Depression    Follow-up       HPI     Ventura Sanabria is a 26 y.o. female with depression, anxiety, asthma. referred by [facility] psychiatry unit where she was admitted as a voluntary commitment because has increased depression, increased anxiety, feeling hopeless and helpless, sleep disturbances, appetite disturbances, crying spells and difficulties with ADL. She has been feeling more depressed and anxious despite that she has been compliant with her medications   Onset of symptoms was  a few weeks ago with gradually worsening course since that time. Psychosocial Stressors: family. She stated that she has a history of depression and anxiety for a long time, she does not enjoy things any longer she has no motivation, she does not go out with her friends as before. She  still working and she likes her job. She saw a therapist when she was a child when her parents  but no other history of therapy. She complies with treatment. She feels her mother is supportive. Karl Hudson feels depressed, poor eye contact, anhedonia, feels hopeless, has  sleep disturbances and decreased energy. She has fleeting suicidal thoughts without plan or intent, she denies any hallucinations or paranoid thinking. She denies any history of manic episodes. Her PHQ-9 is 18.        Review Of Systems:     Mood Depression   Behavior Normal    Thought Content Normal   General Sleep Disturbances   Personality Normal   Other Psych Symptoms Normal   Constitutional Negative   ENT Negative   Cardiovascular Negative   Respiratory Negative   Gastrointestinal Negative   Genitourinary Negative   Musculoskeletal Negative   Integumentary Negative   Neurological Negative   Endocrine Normal    Other Symptoms Normal      PHQ-2/9 Depression Screening    Little interest or pleasure in doing things: 3 - nearly every day  Feeling down, depressed, or hopeless: 2 - more than half the days  Trouble falling or staying asleep, or sleeping too much: 2 - more than half the days  Feeling tired or having little energy: 3 - nearly every day  Poor appetite or overeatin - more than half the days  Feeling bad about yourself - or that you are a failure or have let yourself or your family down: 2 - more than half the days  Trouble concentrating on things, such as reading the newspaper or watching television: 1 - several days  Moving or speaking so slowly that other people could have noticed. Or the opposite - being so fidgety or restless that you have been moving around a lot more than usual: 0 - not at all  Thoughts that you would be better off dead, or of hurting yourself in some way: 3 - nearly every day  PHQ-9 Score: 18   PHQ-9 Interpretation: Moderately severe depression           Past Psychiatric History:      Past Inpatient Psychiatric Treatment:   In Patient this was her first inpatient psych admission  Past Outpatient Psychiatric Treatment:    She follows with [facility]  Past Suicide Attempts:    no  Past Violent Behavior:    no  Past Psychiatric Medication Trials:    Effexor XR and Wellbutrin XL    Family Psychiatric History:   Family History   Adopted: Yes   Family history unknown: Yes       Social History:    Education: college graduate  Learning Disabilities:  None  Marital history: single  Living arrangement, social support:  She lives with her mother. Occupational History: Employed as a medical technician  Functioning Relationships: good support system.   Other Pertinent History:  No legal or  history    Social History     Substance and Sexual Activity   Drug Use Never       Traumatic History:       Abuse:  None  Other Traumatic Events:  None    No history of head injury  No history of seizures    The following portions of the patient's history were reviewed and updated as appropriate: She  has a past medical history of Asthma exacerbation, mild, Chronic tonsillitis, Eczema, Fracture of phalanx of right index finger, Menorrhagia, Mild asthma with acute exacerbation (2/8/2019), Obstructive sleep apnea of child, Orthostatic dizziness, Premenarchal, Seasonal allergies, and Tinea corporis (5/15/2020). She   Patient Active Problem List    Diagnosis Date Noted    Current severe episode of major depressive disorder without psychotic features without prior episode ([address]) 10/27/2023    Anxiety disorder 10/27/2023    Mild intermittent asthma without complication 68/99/8604    Vitamin D deficiency 01/19/2022    Primary dysthymia early onset 08/24/2016     She  has a past surgical history that includes Tonsillectomy and adenoidectomy. Her She was adopted. Family history is unknown by patient. She  reports that she has never smoked. She has never used smokeless tobacco. She reports that she does not drink alcohol and does not use drugs.   Current Outpatient Medications   Medication Sig Dispense Refill    buPROPion (WELLBUTRIN XL) 150 mg 24 hr tablet Take 1 tablet (150 mg total) by mouth daily Do not start before October 31, 2023. 30 tablet 0    cholecalciferol (VITAMIN D3) 1,000 units tablet Take 1 tablet (1,000 Units total) by mouth daily Do not start before October 31, 2023. 30 tablet 0    cyanocobalamin (VITAMIN B-12) 1000 MCG tablet Take 1 tablet (1,000 mcg total) by mouth daily Do not start before October 31, 2023. 30 tablet 0    levalbuterol (XOPENEX HFA) 45 mcg/act inhaler Inhale 1-2 puffs every 4 (four) hours as needed for wheezing 15 g 1    norgestimate-ethinyl estradiol (ORTHO TRI-CYCLEN LO) 0.18/0.215/0.25 MG-25 MCG per tablet Take 1 tablet by mouth daily 84 tablet 3    venlafaxine (EFFEXOR-XR) 75 mg 24 hr capsule Take 3 capsules (225 mg total) by mouth daily Do not start before October 31, 2023. 90 capsule 0     Current Facility-Administered Medications   Medication Dose Route Frequency Provider Last Rate Last Admin    levalbuterol (XOPENEX) inhalation solution 0.63 mg  0.63 mg Nebulization Q8H PRN Mehrdad Clement PA-C   0.63 mg at 02/08/19 1805     She is allergic to other and pollen extract. .       Mental status:  Appearance calm and cooperative , adequate hygiene and grooming, and poor eye contact    Mood depressed   Affect affect was constricted   Speech speech soft   Thought Processes coherent/organized and normal thought processes   Hallucinations no hallucinations present    Thought Content no delusions   Abnormal Thoughts passive/fleeting thoughts of suicide and no homicidal thoughts    Orientation  oriented to person and place and time   Remote Memory short term memory intact and long term memory intact   Attention Span concentration intact   Intellect Appears to be of Average Intelligence   Fund of Knowledge displays adequate knowledge of current events, adequate fund of knowledge regarding past history, and adequate fund of knowledge regarding vocabulary    Insight Insight intact   Judgement judgment was intact   Muscle Strength Muscle strength and tone were normal and Normal gait    Language no difficulty naming common objects, no difficulty repeating a phrase , and no difficulty writing a sentence    Pain none   Pain Scale 0         Laboratory Results: No results found for this or any previous visit.     Lab Results   Component Value Date    WBC 3.03 (L) 10/27/2023    HGB 11.6 10/27/2023    HCT 38.1 10/27/2023    MCV 86 10/27/2023     10/27/2023     Lab Results   Component Value Date     11/21/2015    SODIUM 140 10/27/2023    K 4.2 10/27/2023     10/27/2023    CO2 28 10/27/2023    ANIONGAP 4 11/21/2015    AGAP 9 10/27/2023    BUN 12 10/27/2023    CREATININE 0.88 10/27/2023    GLUC 97 10/27/2023    GLUF 97 10/27/2023    CALCIUM 9.9 10/27/2023    AST 17 10/27/2023    ALT 11 10/27/2023    ALKPHOS 48 10/27/2023    PROT 7.8 11/21/2015    TP 7.4 10/27/2023    BILITOT 0.35 11/21/2015    TBILI 0.29 10/27/2023    EGFR 93 10/27/2023     Lab Results   Component Value Date    CHOLESTEROL 201 (H) 10/27/2023    CHOLESTEROL 133 06/02/2018     Lab Results   Component Value Date    HDL 86 10/27/2023    HDL 64 (H) 06/02/2018    HDL 49 11/15/2014     Lab Results   Component Value Date    TRIG 118 10/27/2023    TRIG 49 06/02/2018    TRIG 84 11/15/2014     Lab Results   Component Value Date    [address] 115 10/27/2023         Assessment/Plan:      Diagnoses and all orders for this visit:    Current severe episode of major depressive disorder without psychotic features without prior episode ([address] )          Treatment Recommendations- Risks Benefits         Immediate Medical/Psychiatric/Psychotherapy Treatments and Any Precautions:     Admit to [facility], medication management and group therapy  Continue Wellbutrin  mg p.o. daily  Continue Effexor XR  225 mg p.o. daily    Risks, Benefits And Possible Side Effects Of Medications:  Risks, benefits, and possible side effects of medications explained to patient and patient verbalizes understanding    Controlled Medication Discussion: No records found for controlled prescriptions according to Jose1 Kassandra Solis.       [facility] Physician's Orders     Admit to: Partial Hospitalization, 5 x per week, for 15 days. Vital signs routine. Diet regular. Group Psychotherapy 9 x per week. [facility] 6 x per week. Diagnosis:   1.  Current severe episode of major depressive disorder without psychotic features without prior episode ([facility])          Medications:   Current Outpatient Medications:     buPROPion (WELLBUTRIN XL) 150 mg 24 hr tablet, Take 1 tablet (150 mg total) by mouth daily Do not start before October 31, 2023., Disp: 30 tablet, Rfl: 0 cholecalciferol (VITAMIN D3) 1,000 units tablet, Take 1 tablet (1,000 Units total) by mouth daily Do not start before October 31, 2023., Disp: 30 tablet, Rfl: 0    cyanocobalamin (VITAMIN B-12) 1000 MCG tablet, Take 1 tablet (1,000 mcg total) by mouth daily Do not start before October 31, 2023., Disp: 30 tablet, Rfl: 0    levalbuterol (XOPENEX HFA) 45 mcg/act inhaler, Inhale 1-2 puffs every 4 (four) hours as needed for wheezing, Disp: 15 g, Rfl: 1    norgestimate-ethinyl estradiol (ORTHO TRI-CYCLEN LO) 0.18/0.215/0.25 MG-25 MCG per tablet, Take 1 tablet by mouth daily, Disp: 84 tablet, Rfl: 3    venlafaxine (EFFEXOR-XR) 75 mg 24 hr capsule, Take 3 capsules (225 mg total) by mouth daily Do not start before October 31, 2023., Disp: 90 capsule, Rfl: 0    Current Facility-Administered Medications:     levalbuterol (XOPENEX) inhalation solution 0.63 mg, 0.63 mg, Nebulization, Q8H PRN, Mehrdad Clement PA-C, 0.63 mg at 45/62/22 0825    “I certify that the continuation of Partial Hospitalization services is medically necessary to improve and/or maintain the patient’s condition and functional level, and to prevent relapse or hospitalization, and that this could not be done at a less intensive level of care.”       Evan Chakraborty MD

## 2023-11-03 NOTE — BH TREATMENT PLAN
Assessment/Plan:       Diagnoses and all orders for this visit:     Diagnoses and all orders for this visit:    Current severe episode of major depressive disorder without psychotic features without prior episode [facility])      [facility] Treatment Plan     AREAS OF NEED: Ed Bledsoe is struggling with symptoms related to her diagnosis of Major Depression as evidenced by lack of motivation, decreased energy, anhedonia, changes in appetite, and labored breathing and shakiness at times due to stressors relating to mental health and finances. Date Initiated: 11/03/23    Strengths: "Listening"     LONG TERM GOAL:   Date Initiated: 11/03/23  1.0 While at [facility], I will gain new skills/techniques/education/support/insights which I can use daily to decrease my symptoms and increase my quality of life. Target Date: 12/01/2023   Completion Date:     SHORT TERM OBJECTIVES:     Date Initiated: 11/03/23  1.1 On a daily basis, I will create and follow a daily routine consisting of at least 2 tasks I would like to complete in the morning, 2 tasks for the afternoon, and at least 1 task by the time I go to bed to feel more productive, form habits, and decrease my stress levels. (included but not limited to: have breakfast, engage in positive affirmations, gratitude lists, paying a bill, cleaning, showering, journaling, meditation, etc.)  Revision Date:   Target Date: 11/14/2023  Completion Date:     Date Initiated: 11/03/23  1.2 To increase self motivation, I will use the acronym DARN-C (Desire, Ability, Reasons, and Need) to change my change talk to a more positive mindset and engage in setting daily SMART goals and schedule rewards for myself when I successfully accomplish said goals. Revision Date:   Target Date: 11/14/2023  Completion Date:    Date Initiated: 11/03/23  1.3 I will take medications as prescribed and share questions and concerns if arise.     Revision Date:  Target Date: 11/14/2023  Completion Date: Date Initiated: 11/03/23  1.4 I will identify 3 ways my supports can assist in my wellness journey and use them if/when needed. Revision Date:  Target Date: 11/14/2023  Completion Date:          7 DAY REVISION:    Date Initiated:  Revision Date:   Target Date:   Completion Date:      PSYCHIATRY:  Date Initiated:  11/03/23  Medication Management and Education      Revision Date:       The person(s) responsible for carrying out the plan is Dr. Khai Quevedo MD & KETAN Morton     NURSING/SYMPTOM EDUCATION:  Date Initiated: 11/03/23       1.1, 1.2. 1.3, 1.4 Provide wellness/symptoms and skill education groups three to five days weekly to educate Majoel Parikh on signs and symptoms of diagnoses, skills to manage stressors, and medication questions that will be addressed by the treatment team.        Revision date: The person(s) responsible for carrying out the plan is Kitty Duncan MS & Joana Coto RN     PSYCHOLOGY:   Date Initiated: 11/03/23       1.1, 1.2, 1.4 Provide psychotherapy group 5 times per week to allow opportunity for Juve Parikh  to explore stressors and ways of coping. Revision Date:   The person(s) responsible for carrying out the plan is KODAK Lopes & Benjy Tadeo     ALLIED THERAPY:   Date Initiated: 11/03/23  1.1,1.2 Engage Juve Parikh in AT group 5 times daily to encourage development and use of wellness tools to decrease symptoms and promote recovery through meaningful activity. Revision Date:       The person(s) responsible for carrying out the plan is GLENDY Luke     CASE MANAGEMENT:   Date Initiated: 11/03/23      1.0 This  will meet with Juve Parikh  3-4 times weekly to assess treatment progress, discharge planning, connection to community    supports and UR as indicated. Revision Date:   The person(s) responsible for carrying out the plan is Essence Gutiérrez [place]     TREATMENT REVIEW/COMMENTS:     DISCHARGE CRITERIA: Identify 3 signs of progress and complete relapse prevention plan. DISCHARGE PLAN: Connect with identified outpatient providers. Estimated Length of Stay: 10 treatment days      CLIENT COMMENTS / Please share your thoughts, feelings, need and/or experiences regarding your treatment plan with Staff. Please see follow up note with comments. Signatures can be found on [facility] Treatment plan consent form.

## 2023-11-03 NOTE — PSYCH
Subjective:     Patient ID: Gilson Martinez is a 26 y.o. female and uses she/her pronouns     [facility] Clinical Progress Notes      Specialized Services Documentation  Therapist must complete separate progress note for each specific clinical activity in which the individual participated during the day. Other:     Pre-Authorization: Submitted PHP authorization through [employer] [facility].  used NPI [id number] for address [facility] [address] [place] [address], [address] [place] [address]. Requested 7 PHP days from 11/03/2023  to 11/13/2023. Confirmation TBD. KODAK Gurrola. Received Voicemail from Kent from [employer]. 7 PHP days approved from 11/03/2023 to 11/13/2023. Tentative contact for update will be Guillermo Monique [phone] ext. [phone] Auth # is:  [id number]. GROUP PSYCHOTHERAPY - MARIAN     ([phone]) Certified Peer Specialist (CPS), Bria Canas shared her life story as she co-led this session. Gilson Martinez  attentively listened to [employer]. This group encouraged power of learning about self, accepting illness and personal responsibility in recovery. Bria Canas also shared the acronym she uses to remind her about what tools she can use on a daily basis to maintain mental health wellness. Gilson Martinez was observed falling asleep at times, appearing disengaged/uninterested. Community resources reviewed in addition to personal resources like the affirmations. MARIAN folder handed out to group members with local resources/support groups. slow beginning progress toward goals noted. Continue psychotherapy to encourage self -awareness and healthy engagement of supports. [place] Plan Objectives: 1.1, 1.2, 1.3, 1.4   Therapist: Padmaja Castillo. Lucero Alcantar [place]      Case Management Note    Padmaja Castillo.  Michael Wolff     Current suicide risk : Low    ([phone]) Met with Gilson Martinez at [facility]. Reviewed program, initial paperwork reviewed: Consent for Treatment, PHP handbook, HIPPA, General Consent, Client Bill of Rights, and Smoking/Drug and Alcohol Policy. Release of Information obtained for emergency contact - Mother - Jaki Mattsoniver ([phone]) and PCP and Health Care Coordination Form. Rodney Thompson declined copies of all paperwork and verbally gave consent. Reviewed and given on call number. PCP notified of admission and health care coordination form sent. Completed initial psycho-social evaluation and initial treatment goals discussed. Medications changes/added/denied? No - See Dr. Rose Marie Ovalles's Note     Treatment session number: 1    Individual Case Management Visit provided today?  yes    [facility] follow up physician's orders: Admit to PHP - See Dr. Franki Lynn's note

## 2023-11-03 NOTE — EMTALA/ACUTE CARE TRANSFER
-Overnight pulse oximetry testing ordered. [facility] will reach out to you regarding this testing.     1. Please follow-up in 1 year with Choco Willett MD regarding your sleep apnea, sooner if problems arise. Call with any questions or concerns.    2. Please bring the data card from your PAP machine to your next visit if needed. It is important for us to review the information from this card to evaluate the effectiveness of your treatment.     3. Please remember to follow cleaning and maintenance instructions for your PAP device.     4. Please contact your [employer] (DME) company to replace your PAP supplies, including mask, tubing, filters and water chamber, at regular intervals. While our office does send in orders, your medical equipment company needs you to contact them when supplies are needed, or they will not be sent.     Contact information for [facility] (phone) [phone]     5. If you need surgery, please inform the surgical team that you have sleep apnea in order to fully support your upper airway.    6. Please call our office to report problematic symptoms such as waking up with a headache most mornings, waking up gasping for air, waking up feeling gassy or with bloating, or reports of breakthrough snoring with using your PAP device.     7. Obstructive sleep apnea as discussed today is a serious medical condition. Due to sudden drops in oxygen levels that occur during episodes of obstructive sleep apnea there is an increased risk to the heart which can cause worsening high blood pressure. The more severe the obstructive sleep apnea, the higher the risk of heart disease, including heart failure, abnormal heart rhythms (arrhythmias) and stroke.    8. Working on ways to improve your weight are also beneficial in treating your sleep apnea. Establishing a daily routine of exercise is recommended as well as eating a nutritious diet that is rich in fruit, vegetables, and  [facility] [address]  Dept: [phone]      EMTALA TRANSFER CONSENT    NAME Dejah Guo                                         2000                              MRN [id number]    I have been informed of my rights regarding examination, treatment, and transfer   by Dr. Brit Miller MD    Benefits:      Risks:        Consent for Transfer:  I acknowledge that my medical condition has been evaluated and explained to me by the emergency department physician or other qualified medical person and/or my attending physician, who has recommended that I be transferred to the service of  Accepting Physician: Corinna Harris at [address] [place] [address] [address] Name, [facility] : [facility]. The above potential benefits of such transfer, the potential risks associated with such transfer, and the probable risks of not being transferred have been explained to me, and I fully understand them. The doctor has explained that, in my case, the benefits of transfer outweigh the risks. I agree to be transferred. I authorize the performance of emergency medical procedures and treatments upon me in both transit and upon arrival at the receiving facility. Additionally, I authorize the release of any and all medical records to the receiving facility and request they be transported with me, if possible. I understand that the safest mode of transportation during a medical emergency is an ambulance and that the Hospital advocates the use of this mode of transport. Risks of traveling to the receiving facility by car, including absence of medical control, life sustaining equipment, such as oxygen, and medical personnel has been explained to me and I fully understand them. (PURVI CORRECT BOX BELOW)  [  ]  I consent to the stated transfer and to be transported by ambulance/helicopter.   [  ]  I consent to the stated transfer, but refuse transportation by ambulance and accept full responsibility for my transportation by car. I understand the risks of non-ambulance transfers and I exonerate the Hospital and its staff from any deterioration in my condition that results from this refusal.    X___________________________________________    DATE  10/26/23  TIME________  Signature of patient or legally responsible individual signing on patient behalf           RELATIONSHIP TO PATIENT_________________________          Provider Certification    NAME Homar Lopes                                         2000                              MRN [id number]    A medical screening exam was performed on the above named patient. Based on the examination:    Condition Necessitating Transfer The primary encounter diagnosis was Medical clearance for psychiatric admission. A diagnosis of Depression was also pertinent to this visit. Patient Condition:      Reason for Transfer:      Transfer Requirements: [address] [place] available and qualified personnel available for treatment as acknowledged by    Agreed to accept transfer and to provide appropriate medical treatment as acknowledged by       [facility]  Appropriate medical records of the examination and treatment of the patient are provided at the time of transfer   [address] [facility] _______  Transfer will be performed by qualified personnel from    and appropriate transfer equipment as required, including the use of necessary and appropriate life support measures.     Provider Certification: I have examined the patient and explained the following risks and benefits of being transferred/refusing transfer to the patient/family:         Based on these reasonable risks and benefits to the patient and/or the unborn child(angelita), and based upon the information available at the time of the patient’s examination, I certify that the medical benefits reasonably to be expected from the provision of appropriate medical treatments at another medical [facility].       In case of a power outage at your home, here is a website that will give you an outline of how to manage with this:  https://www.eBusinessCards.com.Truecaller/blog/should-cpap-users-jgyzm-jx-vksq-lose-power-to-their-machines    While [employer] Well access is available 24 hours a day/7 days a week, messages sent to your provider on Live Well, as well as refill or appointment requests sent via Live Well, are addressed only during normal clinic hours.  We apologize for any inconvenience, but these are not monitored outside of these times. If you have an urgent need outside of these hours, we do recommend calling [phone]. For these messages, we do address them as quickly as possible, however do ask for 48-72 hours to complete. Our clinic hours are as follows:    Monday 8 am to 4:30 pm  Tuesday 8 am to 4:30 pm  Wednesday 8 am to 4:30 pm  Thursday 8 am to 4:30 pm  Friday 8 am to 12 (noon)    facility outweigh the increasing risks, if any, to the individual’s medical condition, and in the case of labor to the unborn child, from effecting the transfer.     X____________________________________________ DATE 10/26/23        TIME_______      ORIGINAL - SEND TO MEDICAL RECORDS   COPY - SEND WITH PATIENT DURING TRANSFER

## 2023-11-03 NOTE — PSYCH
Visit Time    Visit Start Time: 4402  Visit Stop Time:  1330  Total Visit Duration: 60 minutes    Subjective:     Patient ID: Gilson Martinez is a 26 y.o. y.o. female. [facility] Clinical Progress Notes      Specialized Services Documentation  Therapist must complete separate progress note for each specific clinical activity in which the individual participated during the day. Allied Therapy    Gilson Martinez actively shared in music therapy group focused on anger and emotional regulation skills. Kaden Andrade engaged in task exploring effective versus ineffective ways to manage anger; in addition to skills to refocus in the moment. She completed an "anger" crisis card and shared a bit appropriate to a first day in treatment. Group explored the benefits of positive choices with intense emotion. Some initial  work toward goal noted . Continue AT to explore personal role in understanding and managing emotions.   Tx Plan Objective: 1.1,1.2 Therapist:  Titi PIKE

## 2023-11-03 NOTE — PSYCH
Subjective:     Patient ID: Dede Costello 26 y.o. Female    [facility] Clinical Progress Notes      Specialized Services Documentation  Therapist must complete separate progress note for each specific clinical activity in which the individual participated during the day. Case Management    ([phone]) Spoke with Dede Costello for case management. Sohail Jackson and CM checked in about PHP and any needs she has. She is going to finish PHP until Thursday 11/09/2023 and will begin IOP on Tuesday 11/14/2023; attending Tuesday/Wednesday and Thursday. She will be excused from [facility] to leave at 1:30 PM after her goal review to get to work on time. In addition, discussed finding new OP providers. She does not want a male therapist. Discussed [address] as homework to see about their waitlist. Reviewed and signed tx plan. Dede Costello is aware of next scheduled 1:1 meeting. Current suicide risk : Low      Medications changes/added/denied? n/a     Treatment session number: 2     Individual Case Management Visit provided today? Yes      [facility] follow up physician's orders: Continue to follow orders. Therapist: Torsten Reed.  Esthela Mccormick

## 2023-11-06 ENCOUNTER — OFFICE VISIT (OUTPATIENT)
Dept: PSYCHOLOGY | Facility: CLINIC | Age: 23
End: 2023-11-06
Payer: COMMERCIAL

## 2023-11-06 DIAGNOSIS — F32.2 CURRENT SEVERE EPISODE OF MAJOR DEPRESSIVE DISORDER WITHOUT PSYCHOTIC FEATURES WITHOUT PRIOR EPISODE (HCC): Primary | ICD-10-CM

## 2023-11-06 PROCEDURE — 90832 PSYTX W PT 30 MINUTES: CPT

## 2023-11-06 PROCEDURE — G0177 OPPS/PHP; TRAIN & EDUC SERV: HCPCS

## 2023-11-06 PROCEDURE — G0176 OPPS/PHP;ACTIVITY THERAPY: HCPCS

## 2023-11-06 PROCEDURE — G0410 GRP PSYCH PARTIAL HOSP 45-50: HCPCS

## 2023-11-06 NOTE — PSYCH
Subjective:      Patient ID:Karen Sanderson 26 y.o. female     [facility] Clinical Progress Notes       Specialized Services Documentation  Therapist must complete separate progress note for each specific clinical activity in which the individual participated during the day. Education Therapy     [id number] [id number] [facility] attended the group on Part 2 of the [address] [place] (WRAP). Group members were educated on the background of the WRAP. This writer explained the benefit of utilizing the WRAP prior to the members initiating it. Members then focused on developing the following portions of WRAP: Triggers  This writer encouraged the members of group to continue utilizing the WRAP packet to develop plans inside and outside of program.         Anirudh Brown displayed understanding through engagement in the topic with the group . For Catherene Canary,  GOOD progress toward goals was noted, through their engagement in group. Continue psychotherapy to encourage self-awareness and home practice of skills to support wellness.     Treatment Plan Objective 1.1, 1.2, 1.3, 1.4 Therapist: Maximino Mejia RN

## 2023-11-06 NOTE — PSYCH
Subjective:     Patient ID: Anirudh Brown is a 26 y.o. female. [facility] Clinical Progress Notes      Specialized Services Documentation    Group Psychotherapy - Open Processing    [id number]: Anirudh Brown with prompts participated in an open processing group on increasing empowerment and having an opportunity to be heard when one might feel isolated in sharing their experiences.  spent time engaging group participants with open ended questions, reflective questions, and encouragement from other group members. In addition, it is important for the group to be able to receive multiple perspectives and feedback from other group members in a safe environment.  provided space for members to share as they felt comfortable, active listening, encouragement, and offered support to group when warranted. This structure helped support the group in feeling cathartic, gain some interpersonal learning, group cohesiveness, altruism, and instilling hope. Anirudh Brown engaged nonverbally as evidenced by taking notes, maintaining eye contact, open body language, and overall attentiveness. Limited progress noted towards goal. Continue with open processing therapy to provide space to support individuals in building trust, gain corrective emotional experiences, and cultivate healthier inter-dependency with others.     Tx Plan Objective 1.1, 1.2, 1.4 Therapist: Adam Walker LCSW

## 2023-11-06 NOTE — PSYCH
Subjective:    Patient ID: Homar Lopes is a 26 y.o. female      [facility] Clinical Progress Notes      Specialized Services Documentation  Therapist must complete separate progress note for each specific clinical activity in which the individual participated during the day. Education Therapy   [phone]  Homar Lopes actively shared in check in and goal review. Presented as receptive related to readiness to learn. Homar Lopes  did complete goal from last treatment day identifying gaining support. did not present with any barriers to learning. [id number]  Homar Lopes engaged throughout the treatment day. Was engaged in learning related to Illness, Medication, Aftercare and Wellness Tools. Staff utilized Verbal, Written, A/V and Demonstration teaching methods. Homar Lopes shared area of learning and set a goal for outside of program to go to work. Tx Plan Objective: 1.1, 1.2, 1.4, Therapist: [facility]    Group Psychotherapy  [phone] Homar Lopes actively participated in a psychotherapy group focused on control. Group members were each given a sticky note with a different item written on it, and asked to place it on the board, in or outside of a big [place]. The items were things like "my attitude," "the weather," "the past," "how others treat me," etc, and inside of the [place] represented having any amount of control over the item, and outside of the [place] represented having no control at all over the item. After the initial activity, a second [place] was added, which distinguished items we have some or indirect control over and items we have direct or total control over. Participants then re-organized the items on the board accordingly. The group was provided with individual handouts with the three labeled circles to create their own personal diagrams. Discussion was encouraged and prompted throughout the group, and members were asked to share any questions as they arose. Carol Jimenez shared actively throughout group discussions and displayed a personal-responsibility orientated perspective. Continue to note progress towards goals. Continue with psychotherapy to strengthen awareness of control.    Tx Plan Objective 1.1, 1.2, 1.4 Therapist: DAVION Chavez

## 2023-11-07 ENCOUNTER — OFFICE VISIT (OUTPATIENT)
Dept: PSYCHOLOGY | Facility: CLINIC | Age: 23
End: 2023-11-07
Payer: COMMERCIAL

## 2023-11-07 DIAGNOSIS — F32.2 CURRENT SEVERE EPISODE OF MAJOR DEPRESSIVE DISORDER WITHOUT PSYCHOTIC FEATURES WITHOUT PRIOR EPISODE (HCC): Primary | ICD-10-CM

## 2023-11-07 PROCEDURE — G0176 OPPS/PHP;ACTIVITY THERAPY: HCPCS

## 2023-11-07 PROCEDURE — G0177 OPPS/PHP; TRAIN & EDUC SERV: HCPCS

## 2023-11-07 PROCEDURE — G0410 GRP PSYCH PARTIAL HOSP 45-50: HCPCS

## 2023-11-07 NOTE — PSYCH
Subjective:    Patient ID: Arisa Petty is a 26 y.o. female      [facility] Clinical Progress Notes      Specialized Services Documentation  Therapist must complete separate progress note for each specific clinical activity in which the individual participated during the day. Education Therapy   [phone]  Arias Petty actively shared in check in and goal review. Presented as receptive related to readiness to learn. Arias Petty shared that after program she just went to work then rested. did not present with any barriers to learning. [id number]  Arais Petty engaged throughout the treatment day. Was engaged in learning related to Illness, Medication, Aftercare and Wellness Tools. Staff utilized Verbal, Written, A/V and Demonstration teaching methods. Arias Petty shared area of learning and set a goal for outside of program to do something around the house after work. Tx Plan Objective: 1.1, 1.2, 1.4, Therapist: Benjy Aguirre    Group Psychotherapy  [id number] Arias Petty participated actively in a psychotherapy group about effective conflict. The group reflected on associations and experiences they have with verbal conflict and explored the benefits of healthy conflict resolution. The group members were given a handout of nine fair fighting rules (guidelines for constructive conflict) and subsequent discussion occurred. Arias Petty participated actively, but became tearful around the end of the group and left the room. Continue to note progress towards goals. Continue with psychotherapy to encourage further exploration conflict resolution.    Tx Plan Objective 1.1, 1.2, 1.4 Therapist: DAVION Aguirre

## 2023-11-07 NOTE — PSYCH
Visit Time    Visit Start Time: 0930  Visit Stop Time: 3249  Total Visit Duration: 60 minutes    Subjective:     Patient ID: Brian Porter is a 26 y.o. y.o. female. [facility] Clinical Progress Notes      Specialized Services Documentation  Therapist must complete separate progress note for each specific clinical activity in which the individual participated during the day. Allied Therapy  Brian Porter actively shared in music therapy group focused on DBT skill martinez mind. Lula Richardson engaged in tasks exploring differences between reasonable and emotion mind and ways to get to wise mind. Group explored the benefits of mindfulness and practiced ways to slow down to begin to develop wise mind. She took notes and was engaged in the task yet limited personal disclosure. Group reinforced role of “participating with wise mind” in order to prevent getting stuck in the past or fears of the future. Some beginning effort toward treatment goal noted. Continue AT to encourage healthy skill development and practice of explored strategies.   Tx Plan Objective: 1.1Therapist:  Mercedez PIKE

## 2023-11-07 NOTE — PSYCH
Subjective:     Patient ID: Maribell Rolle 26 y.o. Female    [facility] Clinical Progress Notes      Specialized Services Documentation  Therapist must complete separate progress note for each specific clinical activity in which the individual participated during the day. OTHER: This writer was notified that at the end of the day Luisito Martinez became upset and was provided support. She was given an ice pack and was uncontrollably crying. She denied to talk any further with any other . Case Management Note    KODAK Serrato     Current suicide risk : Low     A case management session is not scheduled today with Maribell Rolle ; additionally, they did not request a CM meeting. Maribell Rolle is aware of next scheduled 1:1.    Medications changes/added/denied? N/a     Treatment session number: 3    Individual Case Management Visit provided today?  No    [facility] follow up physician's orders: None at this time

## 2023-11-07 NOTE — PSYCH
Subjective:      Patient ID:Karen Sanderson 26 y.o. female     [facility] Clinical Progress Notes       Specialized Services Documentation  Therapist must complete separate progress note for each specific clinical activity in which the individual participated during the day. Education Therapy     [phone] [place] [address] attended the group on Part 2 of the [address] [facility]) which was a continuation of the previous day. . Group members were educated on the background of the WRAP. This writer explained the benefit of utilizing the WRAP prior to the members initiating it. Members then focused on developing the following portions of WRAP: Triggers and Early Warning Signs. This writer encouraged the members of group to continue utilizing the WRAP packet to develop plans inside and outside of program.         Juliet Soria displayed understanding through engagement in the topic with the group . For Juliet Soria,  GOOD progress toward goals was noted, through their engagement in group. Continue psychotherapy to encourage self-awareness and home practice of skills to support wellness.     Treatment Plan Objective 1.1, 1.2, 1.3, 1.4 Therapist: Pradeep Sun RN

## 2023-11-08 ENCOUNTER — OFFICE VISIT (OUTPATIENT)
Dept: PSYCHOLOGY | Facility: CLINIC | Age: 23
End: 2023-11-08
Payer: COMMERCIAL

## 2023-11-08 ENCOUNTER — OFFICE VISIT (OUTPATIENT)
Dept: PSYCHIATRY | Facility: CLINIC | Age: 23
End: 2023-11-08
Payer: COMMERCIAL

## 2023-11-08 DIAGNOSIS — F32.2 CURRENT SEVERE EPISODE OF MAJOR DEPRESSIVE DISORDER WITHOUT PSYCHOTIC FEATURES WITHOUT PRIOR EPISODE (HCC): Primary | ICD-10-CM

## 2023-11-08 DIAGNOSIS — F41.1 GENERALIZED ANXIETY DISORDER: ICD-10-CM

## 2023-11-08 PROCEDURE — G0177 OPPS/PHP; TRAIN & EDUC SERV: HCPCS

## 2023-11-08 PROCEDURE — 99214 OFFICE O/P EST MOD 30 MIN: CPT | Performed by: NURSE PRACTITIONER

## 2023-11-08 PROCEDURE — G0176 OPPS/PHP;ACTIVITY THERAPY: HCPCS

## 2023-11-08 PROCEDURE — G0410 GRP PSYCH PARTIAL HOSP 45-50: HCPCS

## 2023-11-08 NOTE — PSYCH
Subjective:     Patient ID: Han Padilla is a 26 y.o. female. [facility] Clinical Progress Notes      Specialized Services Documentation  Therapist must complete separate progress note for each specific clinical activity in which the individual participated during the day. Group Psychotherapy ([phone])  Han Padilla engaged in a group focusing on practicing mindfulness, creating a more cohesive group environment, and allowing members to become more familiar with one another (to promote a more comfortable environment for sharing. Each group member was given paper to write down four different unique traits, interesting facts, hobbies/interests, sources of pride or obstacles they have overcome in life. After writing down each statement, each group member tore them into four different pieces of paper then crumbled them up and threw in one big basket.  then picked one at a time out of the basket with the group having to guess who it belonged to. If the group member was guessed correctly (the fact belonged to them), they had the opportunity to explain further and field questions from peers. Group members were encouraged to relate to similarities of other group members while also being mindful and engaging during the group. An example of the personal fact shared by Han Padilla was that she used to have bright orange/yellow hair. Krystina Hodge  will continue with life skills and psychotherapy groups. Good progress made towards treatment. Continue psychotherapy groups to encourage further exploration of needs, personal awareness, and skills. Tx Plan Objective: 1.1,1.2, 1.4   Therapist: Marce Benjamin MS    Education Therapy     4808-7506 Han Padilla engaged throughout the treatment day. Was engaged in learning related to Illness, Medication, Aftercare, and Wellness Tools. Staff utilized Verbal, Written, A/V, and Demonstration teaching methods.   Han Padilla shared area of learning and set a goal for outside of program to reach out to friends and find a therapist.      Tx Plan Objective: 1.1,1.2, 1.4   Therapist: Scooby Toney, [address] [facility]

## 2023-11-08 NOTE — PSYCH
Subjective:     Patient ID: Gilson Martinez 26 y.o. Female    [facility] Clinical Progress Notes      Specialized Services Documentation  Therapist must complete separate progress note for each specific clinical activity in which the individual participated during the day. Education Therapy     [phone] Gilson Martinez  quietly shared in morning assessment and goal review. Presented as Receptive related to readiness to learn. Gilson Martinez did complete goal from last treatment day identifying gaining responsibility and support. did not present with any barriers to learning. [id number] Gilson Martinez engaged throughout the treatment day. Was engaged in learning related to Illness, Medication, Aftercare and Wellness Tools. Staff utilized verbal, written, and demonstration teaching methods. Gilson Martinez shared area of learning and set a goal for outside of program to go to bed before 1:30 AM.     Tx Plan Objective: 1.1, 1.2, 1.4, Therapist:  Padmaja Castillo. Michael Wolff     Case Management    ([phone]) Spoke with Gilson Martinez for case management. Items discussed included plan for discharge. [facility] shared she has a NP appointment at [facility] on 11/17/2023. She asked about discharge and when she will see KETAN Paredes again. She is scheduled Monday. Discussed setting up medication management at [facility] when she starts therapy for aftercare. [facility] is planning to discharge Monday 11/13/2023. She is also reporting that she enjoys program. Gilson Martinez is aware of next scheduled 1:1 meeting. Current suicide risk : Low      Medications changes/added/denied? n/a     Treatment session number: 5     Individual Case Management Visit provided today? Yes      [facility] follow up physician's orders: Continue to follow orders.

## 2023-11-08 NOTE — PSYCH
Subjective:     Patient ID: Ed Bledsoe is a 26 y.o. Female    [facility] Clinical Progress Notes      Specialized Services Documentation  Therapist must complete separate progress note for each specific clinical activity in which the individual participated during the day. Education Therapy     [id number] Ed Bledsoe actively shared in morning assessment and goal review. Presented as Receptive related to readiness to learn. Ed Bledsoe  did complete goal from last treatment day identifying gaining responsibility. did not present with any barriers to learning. Tx Plan Objective: 1.1, 1.2, 1.4, Therapist:  Sameera Goldsmith. Selvin Rico, [address] [facility] Psychotherapy - Healthy Relationships    [id number] Ed Bledsoe  remained attentive throughout and actively  participated in a part psychoeducational group and open processing group focused on the spectrum of healthy relationships and co-dependency qualities.  started group with questions about “Things I love…” to engage the group in conversation. Such questions included favorite thing to cook, best classical movie, and best vacation. This was demonstrated as how such conversations were healthy, supportive, and trustful of each other. After, the group learned about the Drama to [employer] ([employer], victim, and rescuer), and the role it plays in unhealthy relationship dynamics. Group participants were then engaged in discussion on steps to take to avoid contributing to unhealthy interactions:  Notice a pattern  Keep a neutral attitude  Developing personal power  Right to personal agency  Recovery is for those who want it, not for those who need it   FOG (fear, obligation, guilt)  The group was encouraged to engage in open conversation to facilitate beginnings of change process and ways to build stronger relationships.  The group then engaged in open discussion with guided instruction about components of healthy relationships and identifying red, yellow, and green flags. As a whole, the group identified healthy components of a relationship which included:  [address]  Fairness/Equity  Separate Identities   Good Communication    used the following structure to support and lead the group psychoeducation, open ended dialogue for processing, structure, engagement in worksheets and self-reflection, and peer support. Colonel Charles engaged nonverbally as evidenced by taking notes, maintaining eye contact, open body language, and overall attentiveness. Laina Story shared spontaneously when questions were posed by group facilitator. beginning progress noted towards goals. Continue with psychoeducation to further encourage exploration of development of skills necessary for healthy relationships    Tx Plan Objective 1.1, 1.2, 1.4 Therapist: Abran Giraldo. Viraj Holman. 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 Items discussed included Laina Story shared about what occurred yesterday in group and getting upset. She shared how she struggles to be open in groups and has negative core beliefs about self. Particularly if she sounds dumb and has a fear of rejection. However, it was pointed out that she is making some progress as she shared in morning assessment that she wanted to participate more in groups. She continues to remain flat and monotone. 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 Current suicide risk : Low      Medications changes/added/denied? See Chapito Hogan 's note      Treatment session number: 4     Individual Case Management Visit provided today? Yes      [facility] follow up physician's orders: Continue to follow orders.

## 2023-11-08 NOTE — PSYCH
Subjective:     Patient ID: Colby Weber is a 26 y.o. female. [facility] Clinical Progress Notes      Specialized Services Documentation    Group Psychotherapy - Open Processing    [id number]: Colby Weber quietly participated in an open processing group on increasing empowerment and having an opportunity to be heard when one might feel isolated in sharing their experiences.  spent time engaging group participants with open ended questions, reflective questions, and encouragement from other group members. In addition, it is important for the group to be able to receive multiple perspectives and feedback from other group members in a safe environment.  provided space for members to share as they felt comfortable, active listening, encouragement, and offered support to group when warranted. This structure helped support the group in feeling cathartic, gain some interpersonal learning, group cohesiveness, altruism, and instilling hope. Colby Weber engaged nonverbally as evidenced by taking notes, maintaining eye contact, open body language, and overall attentiveness. Limited progress noted towards goal. Continue with open processing therapy to provide space to support individuals in building trust, gain corrective emotional experiences, and cultivate healthier inter-dependency with others.     Tx Plan Objective 1.1, 1.2, 1.4 Therapist: Lalita Rey LCSW

## 2023-11-08 NOTE — PSYCH
PHP MEDICATION MANAGEMENT NOTE        [address] [facility]    Name and Date of Birth:  Colby Weber 75 y.o. 2000 MRN: [id number]    Date of Visit: November 8, 2023    Visit Time    Visit Start Time: 7563   Visit Stop Time: 1010  Total Visit Duration:  30 minutes     The total visit duration detailed above includes: patient engagement, medication management, psychotherapy/counseling, discussion regarding treatment goals, and coordination of care. Note Share Disclaimer: This note was not shared with the patient due to reasonable likelihood of causing patient harm    Allergies   Allergen Reactions    Other      Other reaction(s): Asthma    Pollen Extract Allergic Rhinitis and Wheezing     SUBJECTIVE:    Janie Collins is seen today for a follow up for Major Depressive Disorder, Generalized Anxiety Disorder, and Panic Disorder. She continues to experience ongoing symptoms since beginning PHP. Continues to feel somewhat hopeless and helpless regarding her future. Has felt little benefit from increase of Effexor XR to 225 mg started at the hospital.   And states she had been on that dose previously without improvements. States she had some improvements with Wellbutrin XL, but when her dose was increased to 300 mg (in combination with Effexor  mg) she had nausea and dose reduced back to 150 mg. She expressed frustration with medications and asks about possible medication adjustments. She would like to taper off Effexor and trial another antidepressant. Will start with slow reduction of Effexor XR to 150 mg and monitor response. She continues with passive death wish but no plan or intent. She has noted some improvements since hospitalization, improved ADL's and coming to program.      She denies any side effects from current psychiatric medications. PLAN:    Decrease Effexor to 150 mg daily with plan to continue to taper off as tolerated.     Aware of 24 hour and weekend coverage for urgent situations accessed by calling [facility] main practice number  Continue partial hospitalization program    Diagnoses and all orders for this visit:    Current severe episode of major depressive disorder without psychotic features without prior episode ([address])    Generalized anxiety disorder        Current Outpatient Medications on File Prior to Visit   Medication Sig Dispense Refill    buPROPion (WELLBUTRIN XL) 150 mg 24 hr tablet Take 1 tablet (150 mg total) by mouth daily Do not start before October 31, 2023. 30 tablet 0    cholecalciferol (VITAMIN D3) 1,000 units tablet Take 1 tablet (1,000 Units total) by mouth daily Do not start before October 31, 2023. 30 tablet 0    cyanocobalamin (VITAMIN B-12) 1000 MCG tablet Take 1 tablet (1,000 mcg total) by mouth daily Do not start before October 31, 2023. 30 tablet 0    levalbuterol (XOPENEX HFA) 45 mcg/act inhaler Inhale 1-2 puffs every 4 (four) hours as needed for wheezing 15 g 1    norgestimate-ethinyl estradiol (ORTHO TRI-CYCLEN LO) 0.18/0.215/0.25 MG-25 MCG per tablet Take 1 tablet by mouth daily 84 tablet 3    venlafaxine (EFFEXOR-XR) 75 mg 24 hr capsule Take 3 capsules (225 mg total) by mouth daily Do not start before October 31, 2023. 90 capsule 0     Current Facility-Administered Medications on File Prior to Visit   Medication Dose Route Frequency Provider Last Rate Last Admin    levalbuterol (XOPENEX) inhalation solution 0.63 mg  0.63 mg Nebulization Q8H PRN Mehrdad Clement PA-C   0.63 mg at 02/08/19 1805       HPI ROS Appetite Changes and Sleep:     She reports fluctuating sleep pattern, fluctuating appetite, fluctuating energy levels.  Denies homicidal ideation, denies suicidal ideation    Review Of Systems:   no complaints, all other systems are negative         Mental Status Evaluation:    Appearance:  age appropriate   Behavior:  cooperative   Speech:  normal rate, normal volume   Mood:  depressed Affect:  flat   Thought Process:  goal directed   Associations: intact associations   Thought Content:  negative thinking   Perceptual Disturbances: none   Risk Potential: Suicidal ideation - Yes, passive death wish  Homicidal ideation - None  Potential for aggression - No   Sensorium:  oriented to person, place, and time/date   Memory:  recent and remote memory grossly intact   Consciousness:  alert and awake   Attention: attention span and concentration are age appropriate   Insight:  fair   Judgment: fair   Gait/Station: normal gait/station, normal balance   Motor Activity: no abnormal movements       History Review: The following portions of the patient's history were reviewed and updated as appropriate: psychiatric history, trauma history allergies, current medications, past family history, past medical history, past social history, past surgical history, and problem list   OBJECTIVE:     Vital signs in last 24 hours: There were no vitals filed for this visit. Laboratory Results: I have personally reviewed all pertinent laboratory/tests results. Medications Risks/Benefits:      Risks, Benefits And Possible Side Effects Of Medications:    Discussed risks and benefits of treatment with patient including risk of suicidality, serotonin syndrome, increased QTc interval and SIADH related to treatment with antidepressants;  Risk of induction of manic symptoms in certain patient populations     Controlled Medication Discussion:     Not applicable - controlled prescriptions are not prescribed by this practice    KETAN Rodarte 11/08/23

## 2023-11-09 ENCOUNTER — OFFICE VISIT (OUTPATIENT)
Dept: PSYCHOLOGY | Facility: CLINIC | Age: 23
End: 2023-11-09
Payer: COMMERCIAL

## 2023-11-09 DIAGNOSIS — F32.2 CURRENT SEVERE EPISODE OF MAJOR DEPRESSIVE DISORDER WITHOUT PSYCHOTIC FEATURES WITHOUT PRIOR EPISODE (HCC): Primary | ICD-10-CM

## 2023-11-09 PROCEDURE — G0410 GRP PSYCH PARTIAL HOSP 45-50: HCPCS

## 2023-11-09 PROCEDURE — G0176 OPPS/PHP;ACTIVITY THERAPY: HCPCS

## 2023-11-09 PROCEDURE — G0177 OPPS/PHP; TRAIN & EDUC SERV: HCPCS

## 2023-11-09 NOTE — PSYCH
Subjective:      Patient ID:Karen Mckee Shaka [email or web address] female     [facility] Clinical Progress Notes       Specialized Services Documentation  Therapist must complete separate progress note for each specific clinical activity in which the individual participated during the day. Education Therapy        Time 1230 to [address] attended the psychoeducation group on Discharge Planning / Information. Group members were educated by this writer on the discharge process from [facility] - transitioning to outpatient services, choosing a therapist and psychiatrist, planning for life after discharge from [facility], concerns and feelings towards discharge, and the barriers to same. In addition, the importance of making and keeping appointments was discussed, as well as the importance of being prepared for appointments and to work with their doctors and therapists as an active team member. The group discussed the challenges and benefits of being discharged. Italoalexis German displayed understanding through engagement in the topic with the group . For Brian Porter,  GOOD  progress toward goals was noted, through their engagement in group. Continue psychotherapy to encourage self-awareness and home practice of skills to support wellness.     Treatment Plan Objective 1.1, 1.2, 1.3, 1.4 Therapist: Becky Lopez RN

## 2023-11-09 NOTE — PSYCH
Subjective:     Patient ID: Anibal Montes is a 26 y.o. female. [facility] Clinical Progress Notes      Specialized Services Documentation    Group Psychotherapy - Open Processing    [id number]: Anibal Montes quietly participated in an open processing group on increasing empowerment and having an opportunity to be heard when one might feel isolated in sharing their experiences.  spent time engaging group participants with open ended questions, reflective questions, and encouragement from other group members. In addition, it is important for the group to be able to receive multiple perspectives and feedback from other group members in a safe environment.  provided space for members to share as they felt comfortable, active listening, encouragement, and offered support to group when warranted. This structure helped support the group in feeling cathartic, gain some interpersonal learning, group cohesiveness, altruism, and instilling hope. Anibal Montes did not engage during group as evidenced by appearing disinterested through non participation in group/activities, looking down, and/or getting up and walking out of group. Limited progress noted towards goal. Continue with open processing therapy to provide space to support individuals in building trust, gain corrective emotional experiences, and cultivate healthier inter-dependency with others.     Tx Plan Objective 1.1, 1.2, 1.4 Therapist: Abel Gonzales LCSW

## 2023-11-09 NOTE — PSYCH
Visit Time    Visit Start Time: 0930  Visit Stop Time: 9018  Total Visit Duration: 60 minutes    Subjective:     Patient ID: Oralia Mcdowlel is a 26 y.o. y.o. female. [facility] Clinical Progress Notes      Specialized Services Documentation  Therapist must complete separate progress note for each specific clinical activity in which the individual participated during the day. Allied Therapy Group   Tana palomino shared in music therapy group focused DBT Module Interpersonal Effectiveness and [facility] skill. Engaged in tasks exploring what makes it difficult to share and strategies to improve with supports. Titus Barker participated in discussion related to communication roadblocks passively. She was an attentive participant as group worked together to practice writing out a meaningful interaction using [facility]. She appeared tired. Slow progress toward goal noted. Continue AT to encourage sharing, personal advocacy and practice of wellness tools.         Tx Plan Objective: 1.2,1.4, Therapist:  Malena PIKE

## 2023-11-10 ENCOUNTER — APPOINTMENT (OUTPATIENT)
Dept: PSYCHOLOGY | Facility: CLINIC | Age: 23
End: 2023-11-10
Payer: COMMERCIAL

## 2023-11-10 ENCOUNTER — DOCUMENTATION (OUTPATIENT)
Dept: PSYCHOLOGY | Facility: CLINIC | Age: 23
End: 2023-11-10

## 2023-11-10 NOTE — PSYCH
Assessment/Plan:      Diagnoses and all orders for this visit:     Current severe episode of major depressive disorder without psychotic features without prior episode [facility])        [facility] Treatment Plan      AREAS OF NEED: Temitope Harris is struggling with symptoms related to her diagnosis of Major Depression as evidenced by lack of motivation, decreased energy, anhedonia, changes in appetite, and labored breathing and shakiness at times due to stressors relating to mental health and finances. Date Initiated: 11/03/23     Strengths: "Listening"              LONG TERM GOAL:   Date Initiated: 11/03/23  1.0 While at [facility], I will gain new skills/techniques/education/support/insights which I can use daily to decrease my symptoms and increase my quality of life. Target Date: 12/01/2023   Completion Date: 11/13/2023      SHORT TERM OBJECTIVES:      Date Initiated: 11/03/23  1.1 On a daily basis, I will create and follow a daily routine consisting of at least 2 tasks I would like to complete in the morning, 2 tasks for the afternoon, and at least 1 task by the time I go to bed to feel more productive, form habits, and decrease my stress levels. (included but not limited to: have breakfast, engage in positive affirmations, gratitude lists, paying a bill, cleaning, showering, journaling, meditation, etc.)  Revision Date: n/a - ROUTINE DISCHARGE  Target Date: 11/14/2023  Completion Date: 11/13/2023     Date Initiated: 11/03/23  1.2 To increase self motivation, I will use the acronym DARN-C (Desire, Ability, Reasons, and Need) to change my change talk to a more positive mindset and engage in setting daily SMART goals and schedule rewards for myself when I successfully accomplish said goals. Revision Date: n/a- ROUTINE DISCHARGE  Target Date: 11/14/2023  Completion Date: 11/13/2023      Date Initiated: 11/03/23  1.3 I will take medications as prescribed and share questions and concerns if arise.     Revision Date: n/a- ROUTINE DISCHARGE  Target Date: 11/14/2023  Completion Date: 11/13/2023      Date Initiated: 11/03/23  1.4 I will identify 3 ways my supports can assist in my wellness journey and use them if/when needed. Revision Date: n/a- ROUTINE DISCHARGE  Target Date: 11/14/2023  Completion Date:11/13/2023             7 DAY REVISION:  N/A routine discharge - 11/13/2023    Date Initiated:  Revision Date:   Target Date:   Completion Date:        PSYCHIATRY:  Date Initiated:  11/03/23  Medication Management and Education      Revision Date:       The person(s) responsible for carrying out the plan is Dr. Chuck Fowler MD & KETAN Tamez      NURSING/SYMPTOM EDUCATION:  Date Initiated: 11/03/23       1.1, 1.2. 1.3, 1.4 Provide wellness/symptoms and skill education groups three to five days weekly to educate Aicha Cardona on signs and symptoms of diagnoses, skills to manage stressors, and medication questions that will be addressed by the treatment team.        Revision date: The person(s) responsible for carrying out the plan is Dinora Sykes MS & Maxwell Frank RN      PSYCHOLOGY:   Date Initiated: 11/03/23       1.1, 1.2, 1.4 Provide psychotherapy group 5 times per week to allow opportunity for Aicha Cardona  to explore stressors and ways of coping. Revision Date:   The person(s) responsible for carrying out the plan is KODAK Hwang & [facility]      ALLIED THERAPY:   Date Initiated: 11/03/23  1.1,1.2 Engage Soundra Raw in AT group 5 times daily to encourage development and use of wellness tools to decrease symptoms and promote recovery through meaningful activity.   Revision Date:       The person(s) responsible for carrying out the plan is GLENDY Reeves      CASE MANAGEMENT:   Date Initiated: 11/03/23      1.0 This  will meet with Soundra Raw  3-4 times weekly to assess treatment progress, discharge planning, connection to community    supports and UR as indicated. Revision Date:   The person(s) responsible for carrying out the plan is Essence Granger [place], [place]      TREATMENT REVIEW/COMMENTS:     Traci Hammond successfully discharge from [facility] on Monday 11/13/2023. DISCHARGE CRITERIA: Identify 3 signs of progress and complete relapse prevention plan. DISCHARGE PLAN: Connect with identified outpatient providers. Estimated Length of Stay: 10 treatment days       CLIENT COMMENTS / Please share your thoughts, feelings, need and/or experiences regarding your treatment plan with Staff. Please see follow up note with comments. Signatures can be found on [facility] Treatment plan consent form.

## 2023-11-10 NOTE — PSYCH
Subjective:     Patient ID: Anibal Montes 26 y.o. Female    [facility] Clinical Progress Notes      Specialized Services Documentation  Therapist must complete separate progress note for each specific clinical activity in which the individual participated during the day. Education Therapy     [id number] Anibal Montes engaged throughout the treatment day. Was engaged in learning related to Illness, Medication, Aftercare and Wellness Tools. Staff utilized verbal, written, and demonstration teaching methods. Anibal Montes shared area of learning and set a goal for outside of program to continue therapy. Proceed with routine discharge. Tx Plan Objective: 1.1, 1.2, 1.4, Therapist:  Latanya Peerz. [facility] Psychotherapy - Grief     ([id number]) Anibal Montes participated in a psychotherapy group about Grief and Loss.  used the metaphor of a knotted ball of yarn and explained that grief is not a linear process. We can be in one stage of grief today and another tomorrow, and we vacillate between several positive and unpleasant emotions for quite some time before making peace. Group members were led in a discussion about grief and how we do ourselves a disservice about keeping death/loss so distant. The group discussed why one may avoid grief and loss and the internal experiences that are perceived to be painful to threatening to self. Group discussed the differences between: Grief, Bereavement, and Mourning and different types of losses (Physical, Perceived, Situational, Maturational, Disenfranchised). Group was encouraged to share any experiences they may have with grief and loss death or non-death related lossed (home, divorce, health, loss of innocence, abuse), and how it has impacted their wellness journey. Through this discussion, the five states of grief were discussed - (5 stages: Denial, Anger, Bargaining, Depression, and Acceptance).  - Later in the group, members were introduced to the sixth stage of grief:  Meaning Making coined by Ruel JACKMAN Next, group learned how to let go of guilt in grief. Group participants learned about the “Four Tasks of Mourning” to heal and engaged in discussion about each task: To accept the reality of the loss  To work through the pain of grief  To adjust to life without the   To maintain a connection to the loss while moving on with life    Lastly, group participants were provided with resources they can use to help make me meaning for them as they move through their experiences. Such items included:   Talking about the death of a loved one/talking about the loss   Writing a letter to your loved one  Keeping traditions/ Setting a plate for them at a table at a holiday/special occasion   Creating a memory box/jar  Learning emotional regulation skills  Name what you're going through - mindfulness   Accept your feelings   Labeling when you know you are in each stage of grief (Denial, Anger, Bargaining, Depression, Acceptance)  Therapy     Colonel Charles engaged nonverbally as evidenced by taking notes, maintaining eye contact, open body language, and overall attentiveness. Laina Story also asked some questions to follow up. some  effort towards treatment plan goals. Provide continued psychoeducation and engaged participants in self-reflection surrounding their personal grief journey. Tx Plan Objective 1.1, 1.2, 1.4 Therapist: Abran Giraldo. Viraj Holman. Case Management    ([id number]) Met with Waldemarsami Melvin. Reviewed relapse prevention plan, aftercare plan, and medication list (copies provided). Waldemarsami Charles also attended medication reviews. She will be titrated off Effexor and on 2023 she is to begin 20 mg. Prozac daily, regarding medication.   Laina Story shared that she feels "indifferent, but I really enjoyed being here, the people are amazing" and "Personally I feel nothing has changed", and shared two takeaways from program included: open process and healthy relationships - drama triangle. The pair discussed the possibility of not being ready to change and being open to where she is at in her treatment. AUpon intake Lety's PHQ-9 was a 18 and at discharge their PHQ-9 was a 22. Denied SI, HI, and psychosis. Aftercare providers to receive summary. Current suicide risk : Low     Medications changes/added/denied? No     Treatment session number: 6     Individual Case Management Visit provided today?  Yes     [facility] follow up physician's orders: Proceed with discharge

## 2023-11-10 NOTE — PROGRESS NOTES
[facility] Discharge Instructions:     Disposition: home  Address: [address] [place] [id number]. Diagnosis: Current severe episode of major depressive disorder without psychotic features without priori episode (HCC)   Allergies (Drug/Food): Allergies   Allergen Reactions    Other      Other reaction(s): Asthma    Pollen Extract Allergic Rhinitis and Wheezing       Activity:  No Activity Changes  Diet:no recommendations  Smoking Cessation:not a smoker   Diagnostic/Laboratory Orders: n/a  Vaccines: If you received a vaccine, please notify your family physician on your next visit. For more information, please call [phone]. Follow-up appointments/Referrals:   Sandy Otto. Joselyn Canas LMSW, MDiv  NP Appt. Friday 11/17/2023   [facility], [address] [place] [address], [address] [place] [address]   Phone: [phone]  Fax: [phone]   [email or web address]. Tarun Flores for medication management  Dr. Kanchan Koehler. [facility], [address] [facility]  PH: [phone]    Encouraged to inquire about NP at [facility] & [facility] for medication management - [employer] -MSN CRNP    [facility]: [address] [facility] [address] [facility] / [phone]. - Your  was Jair Lindo and the  is Tyler Maguire     Intake/Referral/Evaluation (Non-Emergency) *NON INSURED FOR FUNDING: [facility]: [phone], [facility]: [phone], [facility]: [phone], [place]: [phone] and [place]: [phone].      Crisis Intervention (Emergency) [place] Service: [facility]: [phone], [place]: [phone], [place]: [phone], [facility]): [phone], [facility]: [phone],  [facility]: [phone] and C/M/P: [phone]. __________________________________________________________________    [facility] Textline: text "HOME" to [address] [address]  ([phone]) or Text "helpline" to [address] [facility]: 988 Call or Text     I, the undersigned, have received and understand the above instructions.         Patient/Rep Signature: __________________________________       Date/Time: ______________       Physician Signature: ____________________________________      Date/Time: ______________             Signature: ________________________________       Date/Time: ______________

## 2023-11-10 NOTE — PROGRESS NOTES
Subjective:     Patient ID: Aicha Cardona 26 y.o. Female    [facility] Clinical Progress Notes      Specialized Services Documentation  Therapist must complete separate progress note for each specific clinical activity in which the individual participated during the day. Case Management     Aicha Cardona is excused from program 11/10/23. Aicha Cardona will return to program on Monday 11/13/2023     Current Suicide Risk: unable to assess    Treatment Session: 6     Medication changes/added/denied? N/a     Individual Case Management Visit Provided Today? N/a      [facility] follow up physician's orders: None at this time.

## 2023-11-13 ENCOUNTER — OFFICE VISIT (OUTPATIENT)
Dept: PSYCHIATRY | Facility: CLINIC | Age: 23
End: 2023-11-13
Payer: COMMERCIAL

## 2023-11-13 ENCOUNTER — OFFICE VISIT (OUTPATIENT)
Dept: PSYCHOLOGY | Facility: CLINIC | Age: 23
End: 2023-11-13
Payer: COMMERCIAL

## 2023-11-13 DIAGNOSIS — F32.2 CURRENT SEVERE EPISODE OF MAJOR DEPRESSIVE DISORDER WITHOUT PSYCHOTIC FEATURES WITHOUT PRIOR EPISODE (HCC): Primary | ICD-10-CM

## 2023-11-13 DIAGNOSIS — F32.81 PMDD (PREMENSTRUAL DYSPHORIC DISORDER): ICD-10-CM

## 2023-11-13 DIAGNOSIS — Z30.41 ENCOUNTER FOR SURVEILLANCE OF CONTRACEPTIVE PILLS: ICD-10-CM

## 2023-11-13 DIAGNOSIS — F41.1 GENERALIZED ANXIETY DISORDER: ICD-10-CM

## 2023-11-13 PROCEDURE — G0410 GRP PSYCH PARTIAL HOSP 45-50: HCPCS

## 2023-11-13 PROCEDURE — 99213 OFFICE O/P EST LOW 20 MIN: CPT | Performed by: NURSE PRACTITIONER

## 2023-11-13 PROCEDURE — G0177 OPPS/PHP; TRAIN & EDUC SERV: HCPCS

## 2023-11-13 PROCEDURE — G0176 OPPS/PHP;ACTIVITY THERAPY: HCPCS

## 2023-11-13 RX ORDER — NORGESTIMATE AND ETHINYL ESTRADIOL
1 KIT DAILY
Qty: 84 TABLET | Refills: 0 | Status: SHIPPED | OUTPATIENT
Start: 2023-11-13

## 2023-11-13 RX ORDER — VENLAFAXINE 37.5 MG/1
37.5 TABLET ORAL DAILY
Qty: 30 TABLET | Refills: 0 | Status: SHIPPED | OUTPATIENT
Start: 2023-11-13 | End: 2023-11-13

## 2023-11-13 RX ORDER — VENLAFAXINE HYDROCHLORIDE 37.5 MG/1
37.5 CAPSULE, EXTENDED RELEASE ORAL DAILY
Qty: 30 CAPSULE | Refills: 0 | Status: SHIPPED | OUTPATIENT
Start: 2023-11-13 | End: 2023-11-13 | Stop reason: SDUPTHER

## 2023-11-13 RX ORDER — BUPROPION HYDROCHLORIDE 150 MG/1
150 TABLET ORAL DAILY
Qty: 30 TABLET | Refills: 0 | Status: SHIPPED | OUTPATIENT
Start: 2023-11-13 | End: 2023-12-13

## 2023-11-13 RX ORDER — FLUOXETINE 20 MG/1
20 TABLET, FILM COATED ORAL DAILY
Qty: 30 TABLET | Refills: 0 | Status: SHIPPED | OUTPATIENT
Start: 2023-11-21

## 2023-11-13 RX ORDER — VENLAFAXINE 37.5 MG/1
37.5 TABLET ORAL DAILY
Qty: 30 TABLET | Refills: 0 | Status: SHIPPED | OUTPATIENT
Start: 2023-11-13 | End: 2023-12-01

## 2023-11-13 NOTE — PSYCH
PHP MEDICATION MANAGEMENT NOTE         [facility]    Name and Date of Birth:  Bolivar Byrd y.o. 2000 MRN: [id number]    Date of Visit: November 13, 2023    Visit Time    Visit Start Time: 1100   Visit Stop Time: 1120  Total Visit Duration:  20 minutes     The total visit duration detailed above includes: patient engagement, medication management, psychotherapy/counseling, discussion regarding treatment goals, and coordination of care. Note Share Disclaimer: This note was not shared with the patient due to reasonable likelihood of causing patient harm    Allergies   Allergen Reactions    Other      Other reaction(s): Asthma    Pollen Extract Allergic Rhinitis and Wheezing     SUBJECTIVE:    iSs Thomson is seen today for a follow up for Major Depressive Disorder and Generalized Anxiety Disorder. She continues to improve gradually since beginning PHP. Patient reports she anticipates discharge from the partial program this week. States she has gained great insight, but continues to struggle with using some of the skills here. However, states it is too stressful to try to work and do the partial program and she must continue to work. She continues with significant depression, and tearful during the interview. However, she denies any active suicidal thoughts, denies any plan or intent. She continues to endorse some intermittent passive death wish. States she does have a safety plan and would be able to reach out for help if suicidal thoughts returned. States she would reach out to her mother, but has a great deal of guilt with mom "I caused her such worry ". She states she is more tearful this week and notes significant mood symptoms during the 2 weeks before her period begins. Symptoms meeting the criteria for PMDD. She continues to state she is interested in medication change.   She has begun to taper off of Effexor, as she states this medication has not been effective for her. She would like to try an SSRI. Given her likely diagnosis of PMDD, she agrees to starting Prozac. With plan for combination of Wellbutrin and Prozac, as she tapers off the Effexor. She denies any side effects from current psychiatric medications. PLAN:  Continue to taper off of Effexor  Wellbutrin  mg  In 1 week will start Prozac 20 mg daily    Aware of 24 hour and weekend coverage for urgent situations accessed by calling [facility] main practice number  Continue partial hospitalization program    Diagnoses and all orders for this visit:    Current severe episode of major depressive disorder without psychotic features without prior episode (HCC)  -     buPROPion (WELLBUTRIN XL) 150 mg 24 hr tablet; Take 1 tablet (150 mg total) by mouth daily  -     FLUoxetine (PROzac) 20 MG tablet; Take 1 tablet (20 mg total) by mouth daily Do not start before November 21, 2023.  -     venlafaxine [facility]) 37.5 mg tablet;  Take 1 tablet (37.5 mg total) by mouth in the morning Take two tablets (75 mg) daily x 7 days, then one tablet (37.5 mg) daily x 7 days, then dc    PMDD (premenstrual dysphoric disorder)    Generalized anxiety disorder      Current Outpatient Medications on File Prior to Visit   Medication Sig Dispense Refill    cholecalciferol (VITAMIN D3) 1,000 units tablet Take 1 tablet (1,000 Units total) by mouth daily Do not start before October 31, 2023. 30 tablet 0    cyanocobalamin (VITAMIN B-12) 1000 MCG tablet Take 1 tablet (1,000 mcg total) by mouth daily Do not start before October 31, 2023. 30 tablet 0    levalbuterol (XOPENEX HFA) 45 mcg/act inhaler Inhale 1-2 puffs every 4 (four) hours as needed for wheezing 15 g 1    [DISCONTINUED] buPROPion (WELLBUTRIN XL) 150 mg 24 hr tablet Take 1 tablet (150 mg total) by mouth daily Do not start before October 31, 2023. 30 tablet 0    [DISCONTINUED] norgestimate-ethinyl estradiol (ORTHO TRI-CYCLEN LO) 0.18/0.215/0.25 MG-25 MCG per tablet Take 1 tablet by mouth daily 84 tablet 3    [DISCONTINUED] venlafaxine (EFFEXOR-XR) 75 mg 24 hr capsule Take 3 capsules (225 mg total) by mouth daily Do not start before October 31, 2023. 90 capsule 0     Current Facility-Administered Medications on File Prior to Visit   Medication Dose Route Frequency Provider Last Rate Last Admin    levalbuterol (XOPENEX) inhalation solution 0.63 mg  0.63 mg Nebulization Q8H PRN Mehrdad Clement PA-C   0.63 mg at 02/08/19 1805       HPI ROS Appetite Changes and Sleep:     She reports fluctuating sleep pattern, fluctuating appetite, fluctuating energy levels. Denies homicidal ideation, denies suicidal ideation    Review Of Systems:   no complaints, all other systems are negative         Mental Status Evaluation:    Appearance:  age appropriate   Behavior:  pleasant, cooperative   Speech:  normal rate, normal volume   Mood:  depressed   Affect:  tearful, flat   Thought Process:  goal directed   Associations: intact associations   Thought Content:  no overt delusions   Perceptual Disturbances: none   Risk Potential: Suicidal ideation - None at present  Homicidal ideation - None  Potential for aggression - No   Sensorium:  oriented to person, place, and time/date   Memory:  recent and remote memory grossly intact   Consciousness:  alert and awake   Attention: decreased concentration and decreased attention span   Insight:  fair   Judgment: improving   Gait/Station: normal gait/station, normal balance   Motor Activity: no abnormal movements       History Review: The following portions of the patient's history were reviewed and updated as appropriate: psychiatric history, trauma history allergies, current medications, past family history, past medical history, past social history, past surgical history, and problem list   OBJECTIVE:     Vital signs in last 24 hours: There were no vitals filed for this visit. Laboratory Results:  I have personally reviewed all pertinent laboratory/tests results. Medications Risks/Benefits:      Risks, Benefits And Possible Side Effects Of Medications:    Discussed risks and benefits of treatment with patient including risk of suicidality, serotonin syndrome, increased QTc interval and SIADH related to treatment with antidepressants;  Risk of induction of manic symptoms in certain patient populations     Controlled Medication Discussion:     Not applicable - controlled prescriptions are not prescribed by this practice    KETAN Whiting 11/13/23

## 2023-11-13 NOTE — BH CRISIS PLAN
Client Name: Maribell Rolle         Client YOB: 2000      Treatment Team (include name and contact information): Follow-up appointments/Referrals:   Sandy Cotter LMSW, MDiv  NP Appt. Friday 11/17/2023   [facility], [address] [facility], [address] [facility]   Phone: [phone]  Fax: [phone]   [email or web address] Meagan Perez for medication management  [facility], [address] [facility]  PH: [phone] [facility] Provider  Ilene Amos MD  [address] [facility] [address] [phone]    Type of Plan   * Plans for all individuals 15 yo and above must be signed by the client. Plan Type: adolescent/adult (14 and over) Initial      My Personal Strengths are (in the client's own words):  "Open minded, compassionate, and listens well"    The stressors and triggers that may put me at risk are:  "My period, social media, and holidays/my birthday"    Coping skills I can use to keep myself calm and safe: Other (describe) "call a friend, be with my cats, and sleep."     Coping skills/supports I can use to maintain abstinence from substance use:   Not Applicable    The people that provide me with help and support: (Include name, contact, and how they can help)   Support person #1: Joya Herrera - Mother     * Phone number:  [phone]     * How can they help me? " I can call her whatever, whenever."     Support person #2:Carissa Duran - Family Friend    * Phone number:  [phone]    * How can they help me?  "Will give the best advice."     Support person #3: April Fuentes     * Phone number: in cell phone    * How can they help me? "I can talk to her about anything."     In the past, the following has helped me in times of crisis:    Calling a friend and Other: sleep, and spending time with my pets      If it is an emergency and you need immediate help, call 9-1-1    If there is a possibility of danger to yourself or others, call the following crisis hotline resources:     Adult Crisis Numbers  Suicide Prevention Hotline - Dial 9-8-8  [facility]: [address] [facility] [address]: [address]: [address]: [phone]  [address]: [phone]  [facility]: [address] [place] [address] [address]: [address]: [phone] (daytime). [phone] (after hours, weekends, holidays)         Please note: Some counties do not have a separate number for Child/Adolescent specific crisis. If your county is not listed under Child/Adolescent, please call the adult number for your county     National Talk to Text Line   All Ages - [phone]    In the event your feelings become unmanageable, and you cannot reach your support system, you will call 911 immediately or go to the nearest hospital emergency room.

## 2023-11-13 NOTE — PSYCH
Subjective:     Patient ID: Juve Parikh is a [facility] y.o. female. [facility] Clinical Progress Notes      Specialized Services Documentation  Therapist must complete separate progress note for each specific clinical activity in which the individual participated during the day. Group Psychotherapy [id number]     This group consisted of roughly 20 minutes which focused on the science/benefits of yoga/Pietro Chi (with a Pietro Chi practice included) and 40 minutes of learning the science behind/practicing Emotional Freedom Tapping. The EFT discussion began with a conversation focusing around the understanding the science behind emotional freedom tapping and in which ways it can help improve lives (sleep, stress, diet, etc). We then discussed the technique in detail and practiced it multiple times as a group. Everyone was also provided with EFT/Yoga packets which described the practice and efficacy of each. Following reflecting on the EFT practice, the group then began discussion on the research-based benefits of yoga and Pietro Chi. We then practiced Niesha Pete as a group. The Niesha Pete practice was also followed by a period of reflection/discussion. Juve Parikh participated by engaging in both exercises and sharing how she felt. Sowmya Coates continues to work on treatment goals. Continue psychotherapy groups to encourage further exploration of needs, personal awareness, and skills. Tx Plan Objective: 1.1,1.2, 1.4   Therapist: Kitty Duncan, [address] [facility]   [id number] Juve Parikh actively shared in morning assessment and goal review. Presented as Receptive related to readiness to learn. Juve Parikh did complete goal from last treatment day identifying gaining support. did not present with any barriers to learning.      Tx Plan Objective: 1.1,1.2, 1.4   Therapist: Kitty Duncan, MS

## 2023-11-13 NOTE — PSYCH
Subjective:     Patient ID: Jac Munoz is a 26 y.o. female. [facility] Clinical Progress Notes      Specialized Services Documentation    Group Psychotherapy - Open Processing  [id number] - Jac Munoz participated in an open processing group on increasing empowerment, increasing self-advocacy, and identifying supports and safe places, providing an opportunity to be heard when one might feel isolated in sharing their experiences.  spent time engaging group participants with open ended questions, reflective questions, and encouragement from other group members. In addition, it is important for the group to be able to receive multiple perspectives and feedback from other group members in a safe environment.  provided space for members to share as they felt comfortable, active listening, encouragement, and offered support to group when warranted. This structure helped support the group in feeling cathartic, gain some interpersonal learning, group cohesiveness, altruism, and instilling hope. Jac Munoz actively participated in group discussion, engaged with  and other group members, and exhibited good progress towards goal. Continue with open processing therapy to provide space to support individuals in building trust, gain corrective emotional experiences, and cultivate healthier interdependency with others.     Treatment Plan Objectives: 1.1, 1.2  Therapist: Anthony Dubosi LCSW

## 2023-11-13 NOTE — PSYCH
Subjective:     Patient ID: Carla Arredondo is a 26 y.o. female. [facility] Discharge Summary:     Admission Date: 11/03/2023  Patient was referred by [facility]   Discharge Date: 11/10/23   Was this a routine discharge? yes     Diagnosis: Axis I: Current severe episode of major depressive disorder without psychotic features without prior episode [facility])     Treating Physician: Dr. Florinda Richardson MD      Treatment Complications: This is noted as not a treatment complication, but an emerging barrier that could be explained as to why Carla Arredondo chose to stay only 6 tx days. - Due to her work schedule, FT 3 pm - 11 pm, and coming to [facility], it created difficulty for Vance Fontenot to be fully engaged in her treatment. She often presented as tired, however, she did report she does like PHP. I    Presenting Need:     Per Dr. Florinda Richardson MD: Carla Arredondo is a 26 y.o. female with depression, anxiety, asthma. referred by [facility] psychiatry unit where she was admitted as a voluntary commitment because has increased depression, increased anxiety, feeling hopeless and helpless, sleep disturbances, appetite disturbances, crying spells and difficulties with ADL. She has been feeling more depressed and anxious despite that she has been compliant with her medications. Onset of symptoms was  a few weeks ago with gradually worsening course since that time. Psychosocial Stressors: family. She stated that she has a history of depression and anxiety for a long time, she does not enjoy things any longer she has no motivation, she does not go out with her friends as before. She  still working and she likes her job. She saw a therapist when she was a child when her parents  but no other history of therapy. She complies with treatment. She feels her mother is supportive.   Mary Lou Decker feels depressed, poor eye contact, anhedonia, feels hopeless, has sleep disturbances and decreased energy. She has fleeting suicidal thoughts without plan or intent, she denies any hallucinations or paranoid thinking. She denies any history of manic episodes. Her PHQ-9 is 18. Per this writer: Ludwin Monet is a 26year old female referred by [address] [place] [address] after she was admitted from 10/26/2023 thru 10/30/2023 due to worsening depression, lack of motivation, decrease in ADLs, and worsening anxiety. During the interview, Delfina Bills was guarded, flat, and constricted in her emotions. Ludwin Monet is a single, overtly appearing  female, only child, residing with her mother, Quintin Bonner. Delfina Bills was adopted and does not know her biological parents or any past histories. Delfina Bills reported her adoptive parents  when she was about 7 or 8 and has no relationship with her adoptive father. She stated she really did not want to come to [facility]. 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 She also recently graduated with her BA in Biology. She likes her job. . She did share with this writer her relationship with her mother "has been through the ringer, but now it's good." She reported growing up she would sneak out of the house, hook up with random people, and has sent explicit photos to older men. She shared that her mother caught her about 3 times. She showers about 2 - 3 times a week, and doesn't consistently brush her teeth. She reports she sleeps too much and has a decreased appetite and food intake. Lety's symptoms include: breathing feeling labored, lack of motivation/energy, anhedonia, sometimes feeling shaky, and passive SI without plan or intent. Denies any hallucinations and paranoid thinking. Reports no substance use or caffeine intake. She wants to find medications that are best for her.       Per Ludwin Monet: "I don't have a good self-image," "I'm adopted if that matters," "I don't know, I don't really have any interests in anything," "I was just hospitalized and they recommended this was the best course of action," and "I don't even know when this started."       Strengths: "Listening"      Course of treatment includes:    group counseling, medication management, individual case management, allied therapy, psychoeducation, and psychiatric evaluation    Treatment Progress: Brian Porter attended 6 days in [facility] in which Lula Richardson challenged negative thoughts, engaging in healthy coping strategies and learned ways to increase motivation and sense of self. During their time in [place] Cecile was an active participant in program and in individual work.  and Lula Richardson addressed the following treatment objectives in case management: On a daily basis, Lula Richardson will create and follow a daily routine consisting of at least 2 tasks she would like to complete in the morning, 2 tasks for the afternoon, and at least 1 task by the time she goes to bed to feel more productive, form habits, and decrease my stress levels. (included but not limited to: have breakfast, engage in positive affirmations, gratitude lists, paying a bill, cleaning, showering, journaling, meditation, etc.),  To increase self motivation, she will use the acronym DARN-C (Desire, Ability, Reasons, and Need) to change my change talk to a more positive mindset and engage in setting daily SMART goals and schedule rewards for herself when she successfully accomplish said goals, taking medications as prescribed and sharing questions/concerns if arise, and utilizing supports if/when needed. Their response to treatment was fair as evidence by a shortened stay in PHP and presenting as guarded for most of engagement in [facility]. However, it is important to note there were areas of glimmers that  saw and encouraged Lula Quinteroas to continue to use a markers of progress and celebrating little successes.  Throughout their time in [facility] had groups on Anger and emotional regulation, control, open processing, WRAP, effective conflict, Wise Mind, mindfulness, healthy relationships, discharge planning and information, Evaline Regan, grief and loss, and EFT/Yoga. Janie Collins also attended medication reviews. She will be titrated off Effexor and on 11/28/2023 she is to begin 20 mg. Prozac daily, regarding medication. Janie Collins shared that she feels "indifferent, but I really enjoyed being here, the people are amazing" and "Personally I feel nothing has changed", and shared two takeaways from program included: open process and healthy relationships - drama triangle. The pair discussed the possibility of not being ready to change and being open to where she is at in her treatment. AUpon intake Lety's PHQ-9 was a 18 and at discharge their PHQ-9 was a 22. Denied SI, HI, and psychosis. Aftercare providers to receive summary. Aftercare recommendations include: Follow-up appointments/Referrals:   Sandy Coronado LMSW, MDiv  NP Appt. Friday 11/17/2023   [facility], [address] [place] [address]   Phone: [phone]  Fax: [phone]   [email or web address] [email or web address] Richardson for medication management  Dr. Allyson Leventhal.    [address] Will: [phone]     Encouraged to inquire about NP at [facility] for medication management - Earl Washington -MSN CRNP    Discharge Medications include:  Current Outpatient Medications:     buPROPion (WELLBUTRIN XL) 150 mg 24 hr tablet, Take 1 tablet (150 mg total) by mouth daily Do not start before October 31, 2023., Disp: 30 tablet, Rfl: 0    cholecalciferol (VITAMIN D3) 1,000 units tablet, Take 1 tablet (1,000 Units total) by mouth daily Do not start before October 31, 2023., Disp: 30 tablet, Rfl: 0    cyanocobalamin (VITAMIN B-12) 1000 MCG tablet, Take 1 tablet (1,000 mcg total) by mouth daily Do not start before October 31, 2023., Disp: 30 tablet, Rfl: 0    levalbuterol (XOPENEX HFA) 45 mcg/act inhaler, Inhale 1-2 puffs every 4 (four) hours as needed for wheezing, Disp: 15 g, Rfl: 1    norgestimate-ethinyl estradiol (ORTHO TRI-CYCLEN LO) 0.18/0.215/0.25 MG-25 MCG per tablet, Take 1 tablet by mouth daily, Disp: 84 tablet, Rfl: 3    venlafaxine (EFFEXOR-XR) 75 mg 24 hr capsule, Take 3 capsules (225 mg total) by mouth daily Do not start before October 31, 2023., Disp: 90 capsule, Rfl: 0    Current Facility-Administered Medications:     levalbuterol (XOPENEX) inhalation solution 0.63 mg, 0.63 mg, Nebulization, Q8H PRN, Mehrdad Clement PA-C, 0.63 mg at 02/08/19 9222

## 2023-11-13 NOTE — PSYCH
[facility] Clinical Progress Notes      Specialized Services Documentation  Therapist must complete separate progress note for each specific clinical activity in which the individual participated during the day.        [facility] follow up physician's orders:   Date: November 13, 2023  Time: 9:20 AM  [address] [facility] Abelino Ovalles MD

## 2023-11-14 ENCOUNTER — APPOINTMENT (OUTPATIENT)
Dept: PSYCHOLOGY | Facility: CLINIC | Age: 23
End: 2023-11-14
Payer: COMMERCIAL

## 2023-11-15 ENCOUNTER — APPOINTMENT (OUTPATIENT)
Dept: PSYCHOLOGY | Facility: CLINIC | Age: 23
End: 2023-11-15
Payer: COMMERCIAL

## 2023-11-16 ENCOUNTER — APPOINTMENT (OUTPATIENT)
Dept: PSYCHOLOGY | Facility: CLINIC | Age: 23
End: 2023-11-16
Payer: COMMERCIAL

## 2023-11-17 ENCOUNTER — APPOINTMENT (OUTPATIENT)
Dept: PSYCHOLOGY | Facility: CLINIC | Age: 23
End: 2023-11-17
Payer: COMMERCIAL

## 2023-11-20 ENCOUNTER — TELEPHONE (OUTPATIENT)
Dept: PSYCHIATRY | Facility: CLINIC | Age: 23
End: 2023-11-20

## 2023-11-20 NOTE — TELEPHONE ENCOUNTER
Josue Carmen  requested a call back to discuss scheduling an appt with Alfred Polanco. They can be reached at P# [phone]. Thank you.

## 2023-11-28 NOTE — TELEPHONE ENCOUNTER
Writer rec another call from pt regarding needing an appt with provider and has not heard back. Please call pt and schedule.     Thank you

## 2023-11-30 ENCOUNTER — TELEPHONE (OUTPATIENT)
Dept: PSYCHIATRY | Facility: CLINIC | Age: 23
End: 2023-11-30

## 2023-11-30 NOTE — TELEPHONE ENCOUNTER
Called patient for a f/u appt with Sera and upon review of the chart came upon this encounter. Jennifer Zapata was just wondering if you would like to transfer care of this patient to your office. If so Dr Sharmaine Duarte will discharge her from her care. Dr Sharmaine Duarte was not aware of this change and will approve of the transfer.

## 2023-12-06 ENCOUNTER — DOCUMENTATION (OUTPATIENT)
Dept: PSYCHIATRY | Facility: CLINIC | Age: 23
End: 2023-12-06

## 2023-12-06 NOTE — PSYCH
PSYCHIATRIC TRANSFER OF CARE SUMMARY    [facility]    Name and Date of Birth:  Ventura Sanabria 26 y.o. 2000    Admission Date: most recently 09/06/23, patient previously saw other [address] [place] [address] providers starting 2014    Discharge Date: 12/6/2023     Referral source: family physician    Discharge Type: Transfer to another provider    Discharge Diagnosis:   Current severe episode of major depressive disorder without psychotic features   PMDD (premenstrual dysphoric disorder)  Generalized anxiety disorder    Treating Physician: Dr. Jaren Eng, DO     Treatment Complications: Did not return for follow up. Requested discharge/transfer. Admit with discharge: No    Prognosis at time of discharge: Guarded    Presenting Problems/Pertinent Findings:      As per Dr. Jayme Irvin HPI during initial evaluation at [address] [place] [address] (after transfer from [facility] clinic) on 10/26/23: "Ventura Sanabria is a 26 y.o. female, single, and presently living with her mother and pets, employed as Medical Technologist, with past medical history significant for asthma, eczema, with past psychiatric history significant for anxiety and depression, with suicide risk factors including limited social support, depressive symptoms, anxiety and age (15-24) with no prior psychiatric admissions, and no prior suicide attempts or gestures. Ventura Sanabria is presenting today for transfer to [place] office from [facility]. Patient was previously seen for transfer of care with this provider in 09/2023 and previously followed with Dr. Sandi Ibrahim through [facility] since 04/2023.      Cheryl San reports "the last 2 weeks are worse," I've been crying more." She reports increased sleep, going to sleep around 1:00am until 11:00am. Patient continues to work 3:00pm-11:00pm, patient endorses "I am always tired." Patient notes decreased memory notably at work. Patient endorses some decreased concentration at work - making small mistakes. Patient reports decreased oral intake - eats 1 meal per day and 1 apple averaging. Patient endorses continued anhedonia - did go to 1 movie with a friend and enjoyed it somewhat. Patient reports increased feelings of guilt, because "my mom is even more worried about me."  Patient endorses passive suicidal ideation, notes "I won't do it," because of her mother and denies a plan. Patient endorses the continued feelings of life is pointless and unable to see a future for herself. The patient endorses self care deficit has worsened with no motivation to shower, brush teeth, or eat, among other ADLs. On initial evaluation by this writer on 09/06/23, patient endorsed:  Patient endorses feelings of guilt due to not feeling productive. Patient denies feelings of worthlessness, she endorses feelings of hopelessness. Patient endorses decreased energy and decreased motivation. Patient endorses passive death wishes and passive suicidal ideation, patient reports if her mother were to pass away she would "maybe," end her life and identifies mother as protective factor. Patient endorses these thoughts started in middle school. Her mother endorses these thoughts started when she was forced to go to counseling with her father after their relationship became estranged. Mother states patient would make statements like "the world would be better without me." The patient states her father "chose his wife over me," and mother has had sole custody of patient since 1st grade. Patient has low self esteem and endorses not liking herself. She and her mother report she has never seen herself positively.      Patient endorses anxiety symptoms related to social encounters, stating "its hard to talk to people." Patient reports a feeling of inner tension and feels "I should be doing something." Patient has excessive worry about "the economy," "how much gas I use," because she is worried about her and her mother's finance and how it effects the world and worries about climate change. She reports spending around 1 hour per day digesting media around these topics and spends time ruminating about them. Patient endorses a history of disordered eating history, she reports only want to eat "healthy stuff," and she would count calories and restrict, she would become angry if foods were "unhealthy," which started in middle school and has improved. Patient reports she ate this way "because I was bigger than all of my friends," and she was diagnosed as obese in school. Patient denies history of binging, purging, or compensatory behaviors. Patient and mother deny texture/consistency or other aspects of food causing restriction. Patient denies history of symptoms related to obsessive-compulsive disorder. Patient does not endorse trauma-related symptoms of flashbacks, nightmares, avoidance, of hypervigilance. The patient denies history of or current manic related symptoms including decreased need for sleep, increased energy and mood, irritability, distractibility, increased goal directed behaviors, rapid speech or thoughts, grandiosity, and impulsivity. Patient endorses history of voices which were self deprecating and self derogatory, occurring a couple of years ago during depressed mood. Patient denies current or history of visual hallucinations. Patient denies history of or demonstrate symptoms consistent with negative symptoms of psychosis. On follow up evaluation by this writer on 10/11/23, patient endorsed:  Patient endorses continued low motivation and low energy. She endorses hypersomnia, recently sleeping until 11:30am and going to bed around 1:00am. Patient denies frequent awakenings of difficulty falling asleep.  Patient notes decreased appetite after increase of Wellbutrin XL to 300 mg daily, patient reports appetite is back to baseline. Patient endorses self care has been decreased, with showers 2-3 times per week, and teeth brushing every 2-3 days. Patient has persistent feelings of guilt, hopelessness, and feelings of "pointless," feeling to life. "I just don't see the point to living," patient endorses the thoughts started "at least 4 years ago," and have been persistent since. Patient endorses she did not disclose this information to provider previously as she did not want to upset her mother to the severity of symptoms. Patient has continued passive death wishes, passive suicidal ideation, denies active suicidal ideation and denies plan. The patient does not endorse current manic related symptoms including decreased need for sleep, increased energy and mood, irritability, distractibility, increased goal directed behaviors, rapid speech or thoughts, grandiosity, and impulsivity. Patient endorses continued loud monologue of her own voice saying self deprecating and self derogatory things. Patient denies this is another's or an unidentifiable voice and feels the thoughts are her own and in her own voice. Patient denies current auditory or visual hallucinations, paranoid ideations, and does not demonstrate symptoms consistent with negative symptoms of psychosis at time of evaluation. Discussed what would feeling "better," look like to patient and she notes "better hygiene, caring about myself." Provided support and worked to identify small, achievable goals for patient.  Patient notes she has difficulty thinking of her future, and thought "I wouldn't be here by now," stating she did not want to end her life, but was hopeful something bad would happen to her."    Past Psychiatric History:   Prior psychiatric diagnoses: Depression and anxiety  Inpatient hospitalizations: patient denies  Suicide attempts/self-harm: patient denies  Violent/aggressive behavior: patient denies  Outpatient psychiatric providers: [facility], Dr. Rahul Avendaño, and prior to PCP, Dr. Brant Velásquez, saw [facility] providers in 2014  Past/current psychotherapy: on waitlist for [facility] psychotherapy  Previously saw counselor, Aldair Rojas, as an adolescent for 2 years including group sessions and sessions with father  Other Services: patient denies  Psychiatric medication trial:   Antidepressants: Effexor, Wellbutrin    Traumatic History:   Abuse: none reported  Other Traumatic Events: parent's divorce as adolescent     Past Medical History:    Past Medical History:   Diagnosis Date    Asthma exacerbation, mild     last assessed - [id number]    Chronic tonsillitis     Eczema     Fracture of phalanx of right index finger     Menorrhagia     last assessed - [id number]    Mild asthma with acute exacerbation 2/8/2019    Obstructive sleep apnea of child     Orthostatic dizziness     last assessed - [id number]    Premenarchal     Seasonal allergies     Resolved - [id number]    Tinea corporis 5/15/2020        Past Surgical History:   Procedure Laterality Date    TONSILLECTOMY AND ADENOIDECTOMY         Allergies: Allergies   Allergen Reactions    Other      Other reaction(s): Asthma    Pollen Extract Allergic Rhinitis and Wheezing       Substance Abuse History:   Patient reported no current substance use. Patient denied history of cocaine, opioids, methamphetamines, or marijuana use. Patient denied inpatient or outpatient rehabilitation services. Denied history of alcohol, illict substance, or tobacco abuse. Denied past legal actions or arrests secondary to substance intoxication. The patient denied prior DWIs/DUIs. Social History     Substance and Sexual Activity   Drug Use Never     Social History     Substance and Sexual Activity   Alcohol Use Never       Family Psychiatric History:     Family History   Adopted: Yes   Family history unknown: Yes       Social History/Trauma History/Past Psychiatric History:  Early life/developmental: Denied a history of milestone/developmental delay. Denied a history of in-utero exposure to toxins/illicit substances. Patient and mother denied history of IEP/504 plans, denied additional supports or special education. Marital history: Single   Children: no  Living arrangement: Lives in a home with mother, 1 dog, and 4 cats. Support system: identifies mother and friends as the biggest source of support  Education: college graduate - biology  Occupational History: works as a medical technologist at [employer]  Other Pertinent History: denied legal issues, denied  history  Access to firearms: patient denied    Social History     Socioeconomic History    Marital status: Single     Spouse name: Not on file    Number of children: Not on file    Years of education: Not on file    Highest education level:  Bachelor's degree (e.g., BA, AB, BS)   Occupational History    Not on file   Tobacco Use    Smoking status: Never    Smokeless tobacco: Never    Tobacco comments:     No tobacco/smoke exposure   Vaping Use    Vaping Use: Never used   Substance and Sexual Activity    Alcohol use: Never    Drug use: Never    Sexual activity: Yes     Partners: Male     Birth control/protection: Condom Male, OCP   Other Topics Concern    Not on file   Social History Narrative    Activities: Musical instrument    Adopted child    Adopted child    Born in [facility]    Brushes teeth twice a day    Dental care, regularly    Flosses teeth regularly    Lives with mother (single parent)    Parents are     Pets/Animals: Cat    Pets/Animals: Dog    Sleeps 8 - 10 hours a day     Social Determinants of Health     Financial Resource Strain: Not on file   Food Insecurity: Not on file   Transportation Needs: Not on file   Physical Activity: Not on file   Stress: Not on file   Social Connections: Not on file   Intimate Partner Violence: Not on file   Housing Stability: Not on file       Therapist:  none, referred to [facility] for psychotherapy and is pending intake    Course of Treatment: Psychiatric Evaluation, Medication Management, and Referral for psychotherapy, Referral for Partial Hospitalization Program    Patient was following through \[facility] [place] [facility]""[facility] clinic from 03/2023. Patient had transfer of care evaluation with this writer on 09/06/23. At time of transfer evaluation, patient was diagnosed with severe episode of MDD and anxiety. Patient was instructed to increase Wellbutrin XL to 300 mg daily and continue Effexor  mg daily. Referral for psychotherapy was confirmed at this visit. Prior to follow up, patient contacted provider to discuss possible adverse effects of Wellbutrin XL increase. Medication was decreased back to 150 mg daily and patient confirmed she had not previously increased Effexor XR as prescribed in Spring 2023, and maintained on 150 mg daily. Close follow up was recommended and appointment was scheduled for 10/11/23. On 10/11/23, patient endorsed continued and worsening depressive symptoms effecting self care and level of functioning. Patient was referred to [facility]. Patient was also recommended to follow up at [facility] office in the resident clinic moving forward and was agreeable. On 10/26/23, patient presented for follow up/initial appointment at [facility] office, and met criteria for inpatient psychiatric admission at time of evaluation in setting of worsening depressed mood, passive suicidal ideation, and self care deficit. Patient completed and signed [phone] with this writer and was transferred to emergency room via S services. Patient was admitted to hospitals, she was monitored and signed a 72 hour notice on second day of admission. Patient was referred for PHP at time of discharge. Patient called office and spoke with this writer on 11/01/23 who confirmed recommendation for PHP and encouraged patient to attend. [facility] and attended until 11/13/23.   During PHP, patient began cross taper from Effexor XR to Prozac and continued Wellbutrin  mg daily. Patient requested follow up with provider seen at [address] [place] [address] program, Tammy Diaz, [address] [place] [address]. Patient has follow up scheduled with requested provider in 02/2024. Summary of Treatment Progress:     Haydee Jordan was seen for initial psychiatric evaluation and medication management. Patient underwent medication adjustments and recommended for [facility] and at later date inpatient admission secondary to symptom severity. Patient attended [facility] after inpatient discharge and was continued on Wellbutrin  mg daily and undergoing a taper from Effexor XR to Prozac at time of transfer. Lethality Risk at the time of discharge from clinic: LOW. At the time of the most recent psychiatric assessment, Haydee Jordan was referred for inpatient admission and was admitted. Per chart review of most recent PHP evaluation, patient demonstrated ability to act in a self-preserving manner as evidenced by volitionally presenting to the clinic, seeking treatment. To mitigate future risk, patient should adhere to treatment recommendations, avoid alcohol/illicit substance use, utilize community-based resources and familiar support, and prioritize mental health treatment.      Suicide/Homicide Risk Assessment at last office visit on 10/26/23:   Risk of Harm to Self:  The following ratings are based on assessment at the time of the interview  Demographic risk factors include: age: young adult (15-24)  Historical Risk Factors include: chronic depressive symptoms, chronic anxiety symptoms  Recent Specific Risk Factors include: diagnosis of mood disorder, current depressive symptoms, current anxiety symptoms, passive death wishes, feelings of guilt or self blame, hopelessness  Protective Factors: no current suicidal ideation, access to mental health treatment, compliant with medications, compliant with mental health treatment, no substance use problems, opportunities to participate in community, resiliency, responsibilities and duties to others, restricted access to lethal means, stable living environment, stable job, supportive family  Weapons: none. The following steps have been taken to ensure weapons are properly secured: not applicable     Risk of Harm to Others: The following ratings are based on assessment at the time of the interview  Demographic Risk Factors include: [age] years of age. Historical Risk Factors include: none. Recent Specific Risk Factors include: concomitant mood disorder, multiple stressors. Protective Factors: no current homicidal ideation, access to mental health treatment, compliant with medications, compliant with mental health treatment, no substance use problems, resilience, responsibilities and duties to others, restricted access to lethal means, safe and stable living environment, supportive family  Weapons: none. The following steps have been taken to ensure weapons are properly secured: not applicable     History Review: The following portions of the patient's history were reviewed and updated as appropriate: allergies, current medications, past family history, past medical history, past social history, past surgical history, and problem list.. Reviewed by chart on 12/06/23, reviewed in person with patient at last appointment on 10/26/23 . MENTAL STATUS EVALUATION (at time of most recent visit on 10/26/23):      Appearance age appropriate, marginal grooming and hygiene, poor eye contact, wearing glasses   Behavior cooperative, calm, sitting comfortably   Speech normal rate, normal pitch, clear, coherent, soft   Mood depressed   Affect constricted, tearful, depressed   Thought Processes organized, coherent, decreased rate of thoughts   Associations intact associations   Thought Content no overt delusions, negative thinking, ruminating thoughts   Perceptual Disturbances: Denies auditory or visual hallucinations and Does not appear to be responding to internal stimuli Abnormal Thoughts  Risk Potential Reports passive suicidal ideation, denies SI with plan, denies HI   Orientation oriented to person, place, time/date, and situation   Memory recent and remote memory grossly intact   Consciousness alert and awake   Attention Span Concentration Span attention span and concentration are age appropriate   Intellect appears to be of average intelligence   Insight fair   Judgement fair   Muscle Strength and  Gait normal muscle strength and normal muscle tone, normal gait and normal balance   Motor Activity no abnormal movements   Language Appears grossly intact   Fund of Knowledge Appears within normal limits     To what extent did Juan Or achieve her goals?: Some      Criteria for Discharge: Requested to follow up with another psychiatric provider. Aftercare Recommendations: Follow up with therapist recommended. Follow up with psychiatrist recommended.       Discharge Medications:     Current Outpatient Medications:     buPROPion (WELLBUTRIN XL) 150 mg 24 hr tablet, Take 1 tablet (150 mg total) by mouth daily, Disp: 30 tablet, Rfl: 0    cholecalciferol (VITAMIN D3) 1,000 units tablet, Take 1 tablet (1,000 Units total) by mouth daily Do not start before October 31, 2023., Disp: 30 tablet, Rfl: 0    cyanocobalamin (VITAMIN B-12) 1000 MCG tablet, Take 1 tablet (1,000 mcg total) by mouth daily Do not start before October 31, 2023., Disp: 30 tablet, Rfl: 0    FLUoxetine (PROzac) 20 MG tablet, Take 1 tablet (20 mg total) by mouth daily Do not start before November 21, 2023., Disp: 30 tablet, Rfl: 0    levalbuterol (XOPENEX HFA) 45 mcg/act inhaler, Inhale 1-2 puffs every 4 (four) hours as needed for wheezing, Disp: 15 g, Rfl: 1    Tri-Lo-Sprintec 0.18/0.215/0.25 MG-25 MCG per tablet, TAKE ONE TABLET BY MOUTH EVERY DAY, Disp: 84 tablet, Rfl: 0    venlafaxine (EFFEXOR) 37.5 mg tablet, Take 1 tablet (37.5 mg total) by mouth in the morning for 18 days Take two tablets (75 mg) daily x 7 days, then one tablet (37.5 mg) daily x 7 days, then dc, Disp: 30 tablet, Rfl: 0    Current Facility-Administered Medications:     levalbuterol (XOPENEX) inhalation solution 0.63 mg, 0.63 mg, Nebulization, Q8H PRN, Mehrdad Clement PA-C, 0.63 mg at 02/08/19 [id number]     Describe ability and willingness to work and solve mental problems:     Able to solve her mental health problems    Aditi Castellon DO 12/06/23   Psychiatry Resident, PGY-III

## 2023-12-14 DIAGNOSIS — F32.2 CURRENT SEVERE EPISODE OF MAJOR DEPRESSIVE DISORDER WITHOUT PSYCHOTIC FEATURES WITHOUT PRIOR EPISODE (HCC): ICD-10-CM

## 2023-12-14 NOTE — TELEPHONE ENCOUNTER
Medication Refill Request     Name of Medication Wellbutrin XL  Dose/Frequency 150mg Take 1 tablet by mouth daily  Quantity 30 Tablets  Verified pharmacy   [x]  Verified ordering Provider   [x]  Does patient have enough for the next 3 days? Yes [] No [x]  Does patient have a follow-up appointment scheduled?  Yes [x] No []   If so when is appointment: 2/1/24

## 2023-12-15 RX ORDER — BUPROPION HYDROCHLORIDE 150 MG/1
150 TABLET ORAL DAILY
Qty: 30 TABLET | Refills: 0 | Status: SHIPPED | OUTPATIENT
Start: 2023-12-15 | End: 2024-01-14

## 2023-12-19 ENCOUNTER — TELEPHONE (OUTPATIENT)
Dept: PSYCHIATRY | Facility: CLINIC | Age: 23
End: 2023-12-19

## 2023-12-22 DIAGNOSIS — F32.2 CURRENT SEVERE EPISODE OF MAJOR DEPRESSIVE DISORDER WITHOUT PSYCHOTIC FEATURES WITHOUT PRIOR EPISODE (HCC): ICD-10-CM

## 2023-12-22 RX ORDER — FLUOXETINE 20 MG/1
20 TABLET, FILM COATED ORAL DAILY
Qty: 30 TABLET | Refills: 0 | Status: SHIPPED | OUTPATIENT
Start: 2023-12-22

## 2023-12-22 NOTE — TELEPHONE ENCOUNTER
Medication Refill Request     Name of Medication Prozac  Dose/Frequency 20MG Take 1 tablet by mouth daily  Quantity 30 Tablets  Verified pharmacy   [x]  Verified ordering Provider   [x]  Does patient have enough for the next 3 days? Yes [] No [x]  Does patient have a follow-up appointment scheduled? Yes [x] No []   If so when is appointment: 2/1/24

## 2024-01-17 DIAGNOSIS — F32.2 CURRENT SEVERE EPISODE OF MAJOR DEPRESSIVE DISORDER WITHOUT PSYCHOTIC FEATURES WITHOUT PRIOR EPISODE (HCC): ICD-10-CM

## 2024-01-17 RX ORDER — FLUOXETINE 20 MG/1
20 TABLET, FILM COATED ORAL DAILY
Qty: 90 TABLET | Refills: 1 | Status: SHIPPED | OUTPATIENT
Start: 2024-01-17

## 2024-01-17 RX ORDER — FLUOXETINE 20 MG/1
20 TABLET, FILM COATED ORAL DAILY
Qty: 30 TABLET | Refills: 0 | Status: SHIPPED | OUTPATIENT
Start: 2024-01-17 | End: 2024-01-17 | Stop reason: SDUPTHER

## 2024-01-17 RX ORDER — BUPROPION HYDROCHLORIDE 150 MG/1
150 TABLET ORAL DAILY
Qty: 90 TABLET | Refills: 1 | Status: SHIPPED | OUTPATIENT
Start: 2024-01-17

## 2024-01-17 RX ORDER — BUPROPION HYDROCHLORIDE 150 MG/1
150 TABLET ORAL DAILY
Qty: 30 TABLET | Refills: 0 | Status: SHIPPED | OUTPATIENT
Start: 2024-01-17 | End: 2024-01-17 | Stop reason: SDUPTHER

## 2024-01-17 NOTE — TELEPHONE ENCOUNTER
Medication Refill Request     Name of Medication Wllbutrin XL  Dose/Frequency 150mg Take 1 tablet by mouth daily  Quantity 30 Tablets  Verified pharmacy   [x]  Verified ordering Provider   [x]  Does patient have enough for the next 3 days? Yes [] No [x]  Does patient have a follow-up appointment scheduled? Yes [x] No []   If so when is appointment: 2/1/24    Medication Refill Request     Name of Medication Prozac  Dose/Frequency 20mg Take 1 tablet by mouth daily  Quantity 30 Tablets  Verified pharmacy   [x]  Verified ordering Provider   [x]  Does patient have enough for the next 3 days? Yes [] No [x]  Does patient have a follow-up appointment scheduled? Yes [x] No []   If so when is appointment: 2/1/24  Patient requested a 90 day supply

## 2024-01-29 DIAGNOSIS — Z30.41 ENCOUNTER FOR SURVEILLANCE OF CONTRACEPTIVE PILLS: ICD-10-CM

## 2024-01-29 RX ORDER — NORGESTIMATE AND ETHINYL ESTRADIOL 7DAYSX3 LO
1 KIT ORAL DAILY
Qty: 84 TABLET | Refills: 0 | Status: SHIPPED | OUTPATIENT
Start: 2024-01-29

## 2024-02-01 ENCOUNTER — OFFICE VISIT (OUTPATIENT)
Dept: PSYCHIATRY | Facility: CLINIC | Age: 24
End: 2024-02-01

## 2024-02-01 DIAGNOSIS — F32.2 CURRENT SEVERE EPISODE OF MAJOR DEPRESSIVE DISORDER WITHOUT PSYCHOTIC FEATURES WITHOUT PRIOR EPISODE (HCC): Primary | ICD-10-CM

## 2024-02-01 DIAGNOSIS — F32.81 PMDD (PREMENSTRUAL DYSPHORIC DISORDER): ICD-10-CM

## 2024-02-01 DIAGNOSIS — F41.1 GENERALIZED ANXIETY DISORDER: ICD-10-CM

## 2024-02-01 NOTE — PSYCH
MEDICATION MANAGEMENT NOTE        [facility]    Name and Date of Birth:  Lety Sanderson 23 y.o. 2000 MRN: [id number]    Date of Visit: February 1, 2024    Visit Time    Visit Start Time: 1300   Visit Stop Time: 1330  Total Visit Duration:  30 minutes     The total visit duration detailed above includes: patient engagement, medication management, psychotherapy/counseling, discussion regarding treatment goals, and coordination of care.      Note Share Disclaimer:     This note was not shared with the patient due to reasonable likelihood of causing patient harm    Allergies   Allergen Reactions    Other      Other reaction(s): Asthma    Pollen Extract Allergic Rhinitis and Wheezing     SUBJECTIVE:    Lety is seen today for a follow up for Major Depressive Disorder and Generalized Anxiety Disorder. She continues to improve gradually since the last visit.  I last saw Lety at the [facility] program.  In November she was started on Prozac in place of her Effexor and in combination with her Wellbutrin.  She continues to report she does better since starting the Prozac and is stable on the 20 mg dose.  She has been back to work and states that is going well.  She struggles with her schedule.  She works evening shift and has trouble falling asleep afterwards and then getting up in the morning.  She is otherwise reporting increased mood with occasional sadness.  Will have a day in the month where she is tearful all day.  It may last 1 or 2 days, but then she recovers.  We did discuss transitioning her from the combination of Wellbutrin and Prozac to Prozac monotherapy.  However, she does note some symptoms of seasonal affective disorder and so we will not make any changes until the spring.  Anxiety and social anxiety symptoms are baseline.  Lives with mom and getting along well with her.  Has a boyfriend and it is getting more serious.  Affect is noticeably brighter.  No  suicidal thoughts or passive death wish since the partial program.    She denies any side effects from current psychiatric medications.      HPI ROS Appetite Changes and Sleep:     She reports fluctuating sleep pattern, fluctuating appetite, fluctuating energy levels. Denies homicidal ideation, denies suicidal ideation    Review Of Systems:   no complaints, all other systems are negative         Mental Status Evaluation:    Appearance:  age appropriate   Behavior:  pleasant, cooperative   Speech:  normal rate, normal volume   Mood:  improved   Affect:  brighter   Thought Process:  goal directed   Associations: intact associations   Thought Content:  no overt delusions   Perceptual Disturbances: none   Risk Potential: Suicidal ideation - None  Homicidal ideation - None  Potential for aggression - No   Sensorium:  oriented to person, place, and time/date   Memory:  recent and remote memory grossly intact   Consciousness:  alert and awake   Attention: attention span and concentration are age appropriate   Insight:  age appropriate   Judgment: age appropriate   Gait/Station: normal gait/station, normal balance   Motor Activity: no abnormal movements       History Review:The following portions of the patient's history were reviewed and updated as appropriate: psychiatric history, trauma history allergies, current medications, past family history, past medical history, past social history, past surgical history, and problem list   OBJECTIVE:     Vital signs in last 24 hours:    There were no vitals filed for this visit.  Laboratory Results: I have personally reviewed all pertinent laboratory/tests results.      Suicide/Homicide Risk Assessment:    The following interventions are recommended: no intervention changes needed. Although patient's acute lethality risk is low, long-term/chronic lethality risk is mildly elevated in the presence of depression and anxiety symptoms. At the current moment, Ltey is future-oriented,  forward-thinking, and demonstrates ability to act in a self-preserving manner as evidenced by volitionally presenting to the clinic today, seeking treatment. To mitigate future risk, patient should adhere to the recommendations below, avoid alcohol/illicit substance use, utilize community-based resources and familiar support and prioritize mental health treatment. Presently, patient denies active suicidal/homicidal ideation in addition to thoughts of self-injury; contracts for safety.  At conclusion of evaluation, patient is amenable to the recommendations below. Patient is amenable to calling/contacting the outpatient office including this writer if any acute adverse effects of their medication regimen arise in addition to any comments or concerns pertaining to their psychiatric management.  Patient is amenable to calling/contacting crisis and/or attending to the nearest emergency department if their clinical condition deteriorates to assure their safety and stability, stating that they are able to appropriately confide in their individual therapist and mom regarding their psychiatric state.    Assessment/Plan:     PLAN:  Diagnoses and all orders for this visit:    Current severe episode of major depressive disorder without psychotic features without prior episode (HCC)    Generalized anxiety disorder    PMDD (premenstrual dysphoric disorder)         Treatment Recommendations/Precautions:    She will track mood as it relates to her monthly cycle  Continue Prozac 20 mg daily  Continue Wellbutrin  mg daily  She may try melatonin for sleep  She continues with a therapist through [facility]  Continue to follow-up with family doctor for other medical issues.  Medication management every 2 months  Aware of 24 hour and weekend coverage for urgent situations accessed by calling [facility] main practice number  Patient advised to call 911 if feeling suicidal or homicidal  before acting out on their thoughts and they expressed understanding.    Medications Risks/Benefits      Risks, Benefits And Possible Side Effects Of Medications:    Discussed risks and benefits of treatment with patient including risk of suicidality, serotonin syndrome, increased QTc interval and SIADH related to treatment with antidepressants; Risk of induction of manic symptoms in certain patient populations         Controlled Medication Discussion:     Not applicable - controlled prescriptions are not prescribed by this practice    Psychotherapy Provided:     Individual psychotherapy provided: Medications, treatment progress and treatment plan reviewed with Lety.  Reassurance and supportive therapy provided.   Crisis/safety plan discussed with Lety.     Treatment Plan:    Completed and signed during the session: Not applicable - Treatment Plan not due at this session    This note was not shared with the patient due to reasonable likelihood of causing patient harm      KETAN Calhoun 02/01/24

## 2024-02-01 NOTE — BH CRISIS PLAN
Client Name: Lety Sanderson       Client YOB: 2000    NithinMathew Safety Plan      Creation Date: 2/1/24 Update Date: 2/1/24   Created By: KETAN Calhoun Last Updated By: KETAN Calhoun      Step 1: Warning Signs:   Warning Signs   My period   Social media   Holidays/my birth            Step 2: Internal Coping Strategies:   Internal Coping Strategies   Call a friend   With my   Sleep            Step 3: People and social settings that provide distraction:   Name Contact Information   Minor Sauceda [phone]   Carissa in cell phone   Nicole Number and self    Places   As above           Step 4: People whom I can ask for help during a crisis:      Name Contact Information    Komal Sanderson [phone]      Step 5: Professionals or agencies I can contact during a crisis:      [facility]/Agency Name Phone Emergency Contact    [facility] [phone]       Local Emergency Department Emergency Department Phone Emergency Department Address    [facility] in [place]          Crisis Phone Numbers:   Suicide Prevention Lifeline: Call or Text  693 Crisis Text Line: Text HOME to 848-290   Please note: Some [facility] do not have a separate number for Child/Adolescent specific crisis. If your county is not listed under Child/Adolescent, please call the adult number for your county      Adult Crisis Numbers: Child/Adolescent Crisis Numbers   [facility]: [phone] [facility]: [phone]   [facility]: [phone] [facility]: [phone]   [facility]: [phone] [place]: [phone]   [facility]: [phone] [place]: [phone]   [place] [facility]: [phone]   [facility]: [phone]   [facility]: [phone]   [facility]: [phone] (daytime) [phone] (after hours, weekends, holidays)      Step 6: Making the environment safer (plan for lethal means safety):   Patient did not  identify any lethal methods: Yes     Optional: What is most important to me and worth living for?   Family cats and friends     Yordan Safety Plan. María Weller and Neto Carmona. Used with permission of the authors.

## 2024-02-19 ENCOUNTER — TELEPHONE (OUTPATIENT)
Dept: PSYCHIATRY | Facility: CLINIC | Age: 24
End: 2024-02-19

## 2024-02-19 NOTE — TELEPHONE ENCOUNTER
Writer called and left message to reschedule follow up appointment with Jovita Delgado due to provider schedule changing.

## 2024-02-21 NOTE — PROGRESS NOTES
Assessment/Plan   Problem List Items Addressed This Visit    None  Visit Diagnoses       Well woman exam    -  Primary    Encounter for surveillance of contraceptive pills        Relevant Medications    norgestimate-ethinyl estradiol (Tri-Lo-Sprintec) 0.18/0.215/0.25 MG-25 MCG per tablet            Discussion  I have discussed the importance of monthly self-breast exams, exercise and healthy diet.    Encourage safe sexual practices; STI testing - declines  Contraception - MADHU    The current ASCCP guidelines were reviewed. Patient's last pap was 2022 and therefore, a pap with HPV cotesting is not  indicated at this time.     All questions have been answered to her satisfaction  RTO for APE or sooner if needed    Subjective     HPI   Lety Sanderson is a 23 y.o. female who presents for annual well woman exam.     Menarche: 14; LMP - 02/14/24; Periods are reg q 28 days and last  5 days; No excessive bleeding; No intermenstrual bleeding or spotting; Cramps are tolerable.    No vulvar itch/burn; No vaginal itch/burn; No abn discharge or odor;     No urinary sx - burning/pain/frequency/hematuria    (-) SBEs - no family history of breast CA.    No abd/pelvic pain or HAs;     Pt is sexually active in a mutually monog sexual relationship; No issues with intercourse; She declines sti/hiv/hep testing; Feels safe at home.    Current contraception: MADHU  Condom use: no  Gardasil - completed    (+) PCP for routine Bw/care;      Review of Systems   Constitutional:  Negative for fatigue.   Eyes:  Negative for photophobia and visual disturbance.   Respiratory:  Negative for cough and shortness of breath.    Cardiovascular:  Negative for chest pain and palpitations.   Gastrointestinal:  Negative for abdominal pain, blood in stool, constipation, diarrhea, nausea and rectal pain.   Genitourinary:  Negative for dyspareunia, dysuria, flank pain, frequency, genital sores, menstrual problem, pelvic pain, urgency, vaginal bleeding, vaginal  discharge and vaginal pain.   Musculoskeletal:  Negative for arthralgias and back pain.   Skin:  Negative for rash.   Neurological:  Negative for weakness and headaches.       The following portions of the patient's history were reviewed and updated as appropriate: allergies, current medications, past family history, past medical history, past social history, past surgical history, and problem list.         OB History          0    Para   0    Term   0       0    AB   0    Living   0         SAB   0    IAB   0    Ectopic   0    Multiple   0    Live Births   0                 Past Medical History:   Diagnosis Date    Asthma exacerbation, mild     last assessed - 2016    Chronic tonsillitis     Depression     Eczema     Fracture of phalanx of right index finger     Menorrhagia     last assessed - 2014    Mild asthma with acute exacerbation 2019    Obstructive sleep apnea of child     Orthostatic dizziness     last assessed - 2015    Premenarchal     Seasonal allergies     Resolved - 2017    Tinea corporis 05/15/2020       Past Surgical History:   Procedure Laterality Date    TONSILLECTOMY AND ADENOIDECTOMY         Family History   Adopted: Yes   Family history unknown: Yes       Social History     Socioeconomic History    Marital status: Single     Spouse name: Not on file    Number of children: Not on file    Years of education: Not on file    Highest education level: Bachelor's degree (e.g., BA, AB, BS)   Occupational History    Not on file   Tobacco Use    Smoking status: Never    Smokeless tobacco: Never    Tobacco comments:     No tobacco/smoke exposure   Vaping Use    Vaping status: Never Used   Substance and Sexual Activity    Alcohol use: Never    Drug use: Never    Sexual activity: Yes     Partners: Male     Birth control/protection: Condom Male, OCP   Other Topics Concern    Not on file   Social History Narrative    Activities: Musical instrument    Adopted child     "Adopted child    Born in [place]    Brushes teeth twice a day    Dental care, regularly    Flosses teeth regularly    Lives with mother (single parent)    Parents are     Pets/Animals: Cat    Pets/Animals: Dog    Sleeps 8 - 10 hours a day     Social Determinants of Health     Financial Resource Strain: Not on file   Food Insecurity: Not on file   Transportation Needs: Not on file   Physical Activity: Not on file   Stress: Not on file   Social Connections: Not on file   Intimate Partner Violence: Not on file   Housing Stability: Not on file         Current Outpatient Medications:     buPROPion (WELLBUTRIN XL) 150 mg 24 hr tablet, Take 1 tablet (150 mg total) by mouth daily, Disp: 90 tablet, Rfl: 1    FLUoxetine (PROzac) 20 MG tablet, Take 1 tablet (20 mg total) by mouth daily, Disp: 90 tablet, Rfl: 1    levalbuterol (XOPENEX HFA) 45 mcg/act inhaler, Inhale 1-2 puffs every 4 (four) hours as needed for wheezing, Disp: 15 g, Rfl: 1    norgestimate-ethinyl estradiol (Tri-Lo-Sprintec) 0.18/0.215/0.25 MG-25 MCG per tablet, Take 1 tablet by mouth daily, Disp: 84 tablet, Rfl: 3    cholecalciferol (VITAMIN D3) 1,000 units tablet, Take 1 tablet (1,000 Units total) by mouth daily Do not start before October 31, 2023., Disp: 30 tablet, Rfl: 0    cyanocobalamin (VITAMIN B-12) 1000 MCG tablet, Take 1 tablet (1,000 mcg total) by mouth daily Do not start before October 31, 2023., Disp: 30 tablet, Rfl: 0    Current Facility-Administered Medications:     levalbuterol (XOPENEX) inhalation solution 0.63 mg, 0.63 mg, Nebulization, Q8H PRN, Mehrdad Clement PA-C, 0.63 mg at 02/08/19 1805    Allergies   Allergen Reactions    Other      Other reaction(s): Asthma    Pollen Extract Allergic Rhinitis and Wheezing       Objective   Vitals:    02/26/24 1120   BP: 98/56   BP Location: Left arm   Patient Position: Sitting   Cuff Size: Standard   Weight: 57.2 kg (126 lb)   Height: 5' 2\" (1.575 m)     Physical Exam  Vitals and nursing note " reviewed.   Constitutional:       Appearance: Normal appearance. She is well-developed and normal weight.   HENT:      Head: Normocephalic and atraumatic.   Eyes:      Conjunctiva/sclera: Conjunctivae normal.   Cardiovascular:      Rate and Rhythm: Normal rate and regular rhythm.      Heart sounds: Normal heart sounds.   Pulmonary:      Effort: Pulmonary effort is normal.      Breath sounds: Normal breath sounds.   Chest:   Breasts:     Breasts are symmetrical.      Right: Normal. No inverted nipple, mass, nipple discharge, skin change or tenderness.      Left: Normal. No inverted nipple, mass, nipple discharge, skin change or tenderness.   Abdominal:      General: Abdomen is flat. There is no distension.      Palpations: Abdomen is soft. There is no mass.      Tenderness: There is no abdominal tenderness. There is no right CVA tenderness or left CVA tenderness.   Genitourinary:     General: Normal vulva.      Exam position: Lithotomy position.      Pubic Area: No rash or pubic lice.       Labia:         Right: No rash or tenderness.         Left: No rash or tenderness.       Urethra: No urethral pain.      Vagina: Normal. No vaginal discharge.      Cervix: Normal.      Uterus: Normal. No uterine prolapse.       Adnexa: Right adnexa normal and left adnexa normal.        Right: No mass or tenderness.          Left: No mass or tenderness.     Musculoskeletal:         General: No tenderness. Normal range of motion.      Cervical back: Normal range of motion. No tenderness.      Right lower leg: No edema.      Left lower leg: No edema.   Lymphadenopathy:      Cervical: No cervical adenopathy.      Upper Body:      Right upper body: No supraclavicular or axillary adenopathy.      Left upper body: No supraclavicular or axillary adenopathy.      Lower Body: No right inguinal adenopathy. No left inguinal adenopathy.   Skin:     General: Skin is warm and dry.   Neurological:      Mental Status: She is alert and oriented to  person, place, and time.      Motor: No weakness.   Psychiatric:         Mood and Affect: Mood normal.         Behavior: Behavior normal.         Thought Content: Thought content normal.         Judgment: Judgment normal.         There are no Patient Instructions on file for this visit.

## 2024-02-26 ENCOUNTER — ANNUAL EXAM (OUTPATIENT)
Dept: OBGYN CLINIC | Facility: CLINIC | Age: 24
End: 2024-02-26
Payer: COMMERCIAL

## 2024-02-26 VITALS
HEIGHT: 62 IN | DIASTOLIC BLOOD PRESSURE: 56 MMHG | SYSTOLIC BLOOD PRESSURE: 98 MMHG | BODY MASS INDEX: 23.19 KG/M2 | WEIGHT: 126 LBS

## 2024-02-26 DIAGNOSIS — Z01.419 WELL WOMAN EXAM: Primary | ICD-10-CM

## 2024-02-26 DIAGNOSIS — Z30.41 ENCOUNTER FOR SURVEILLANCE OF CONTRACEPTIVE PILLS: ICD-10-CM

## 2024-02-26 PROCEDURE — S0612 ANNUAL GYNECOLOGICAL EXAMINA: HCPCS

## 2024-02-26 RX ORDER — NORGESTIMATE AND ETHINYL ESTRADIOL 7DAYSX3 LO
1 KIT ORAL DAILY
Qty: 84 TABLET | Refills: 3 | Status: SHIPPED | OUTPATIENT
Start: 2024-02-26

## 2024-04-09 DIAGNOSIS — F32.2 CURRENT SEVERE EPISODE OF MAJOR DEPRESSIVE DISORDER WITHOUT PSYCHOTIC FEATURES WITHOUT PRIOR EPISODE (HCC): ICD-10-CM

## 2024-04-09 RX ORDER — FLUOXETINE 20 MG/1
20 TABLET, FILM COATED ORAL DAILY
Qty: 90 TABLET | Refills: 1 | Status: SHIPPED | OUTPATIENT
Start: 2024-04-09

## 2024-04-09 RX ORDER — BUPROPION HYDROCHLORIDE 150 MG/1
150 TABLET ORAL DAILY
Qty: 90 TABLET | Refills: 1 | Status: SHIPPED | OUTPATIENT
Start: 2024-04-09

## 2024-04-09 NOTE — TELEPHONE ENCOUNTER
Medication:   Wellbutrin xl 150mg  Prozac 20mg      Pharmacy: [facility] - [place] - [address] [place] [address] [address]

## 2024-06-06 ENCOUNTER — TELEMEDICINE (OUTPATIENT)
Dept: PSYCHIATRY | Facility: CLINIC | Age: 24
End: 2024-06-06

## 2024-06-06 DIAGNOSIS — F41.1 GENERALIZED ANXIETY DISORDER: ICD-10-CM

## 2024-06-06 DIAGNOSIS — F32.2 CURRENT SEVERE EPISODE OF MAJOR DEPRESSIVE DISORDER WITHOUT PSYCHOTIC FEATURES WITHOUT PRIOR EPISODE (HCC): Primary | ICD-10-CM

## 2024-06-06 DIAGNOSIS — F32.81 PMDD (PREMENSTRUAL DYSPHORIC DISORDER): ICD-10-CM

## 2024-06-06 DIAGNOSIS — R53.83 FATIGUE, UNSPECIFIED TYPE: ICD-10-CM

## 2024-06-06 RX ORDER — BUPROPION HYDROCHLORIDE 150 MG/1
150 TABLET ORAL DAILY
Qty: 90 TABLET | Refills: 1 | Status: SHIPPED | OUTPATIENT
Start: 2024-06-06

## 2024-06-06 RX ORDER — FLUOXETINE 20 MG/1
40 TABLET, FILM COATED ORAL DAILY
Qty: 180 TABLET | Refills: 1 | Status: SHIPPED | OUTPATIENT
Start: 2024-06-06

## 2024-06-06 NOTE — PSYCH
Virtual Regular Visit    Verification of patient location:    Patient is located at Home in the following state in which I hold an active license [place]      Assessment/Plan:    Problem List Items Addressed This Visit       Current severe episode of major depressive disorder without psychotic features without prior episode (HCC)    Relevant Medications    buPROPion (WELLBUTRIN XL) 150 mg 24 hr tablet    FLUoxetine (PROzac) 20 MG tablet         Reason for visit is   Chief Complaint   Patient presents with    Virtual Regular Visit          Encounter provider KETAN Calhoun      Recent Visits  No visits were found meeting these conditions.  Showing recent visits within past 7 days and meeting all other requirements  Today's Visits  Date Type Provider Dept   06/06/24 Telemedicine KETAN Calhoun [facility] [address]   Showing today's visits and meeting all other requirements  Future Appointments  No visits were found meeting these conditions.  Showing future appointments within next 150 days and meeting all other requirements       The patient was identified by name and date of birth. Lety Sanderson was informed that this is a telemedicine visit and that the visit is being conducted throughthe [employer] platform. She agrees to proceed..  My office door was closed. No one else was in the room.  She acknowledged consent and understanding of privacy and security of the video platform. The patient has agreed to participate and understands they can discontinue the visit at any time.    Patient is aware this is a billable service.     MEDICATION MANAGEMENT NOTE        [facility]    Name and Date of Birth:  Lety Sanderson 24 y.o. 2000 MRN: [id number]    Date of Visit: June 6, 2024    Visit Time    Visit Start Time: 1400   Visit Stop Time: 1430  Total Visit Duration:  30 minutes     The total visit duration detailed above includes: patient engagement,  medication management, psychotherapy/counseling, discussion regarding treatment goals, and coordination of care.      Note Share Disclaimer:     This note was not shared with the patient due to reasonable likelihood of causing patient harm    Allergies   Allergen Reactions    Other      Other reaction(s): Asthma    Pollen Extract Allergic Rhinitis and Wheezing     SUBJECTIVE:    Lety is seen today for a follow up for Major Depressive Disorder, Generalized Anxiety Disorder, and PMDD . She reports that she continues to experience significant symptoms since the last visit.  Since last visit she presents significantly more depressed.  Today she rates her depression 8 or 9 out of 10, 10 being worst.  Admits to passive death wish but denies any active suicidal ideation.  Has been doing individual therapy but does not feel it has been very effective.  Had initially noted some response from Prozac, mood has worsened over the past week or so.  She does continue to note significant worsening of her mood in the winter between when she ovulates and when her period starts.  This is consistent with her PMDD diagnosis.  I discussed again with her increasing her Prozac, as this is shown to be helpful for PMDD.  She is presenting a little more hopeless and less optimistic as compared to last visit.  She continues with her boyfriend and is doing well in that relationship.  Things are going well at work, but she has a sense that she should be doing more.  Will take walks and walk with mom and is able to vent to her.  She is noting her nails are more brittle, and some hair thinning around her forehead.  She states her appetite has remained good.  Denies any other changes in medications or medical presentation.  Will order CBC, CMP, calcium level, iron level to rule out any medical cause.  Did discuss it may be stress related also.  Did encourage her to follow up with her PCP also.    She denies any side effects from current  psychiatric medications.      HPI ROS Appetite Changes and Sleep:     She reports fluctuating sleep pattern, fluctuating appetite, fluctuating energy levels. Denies homicidal ideation, denies suicidal ideation    Review Of Systems:   all other systems are negative         Mental Status Evaluation:    Appearance:  age appropriate   Behavior:  cooperative, guarded   Speech:  slow   Mood:  depressed   Affect:  flat   Thought Process:  goal directed   Associations: perseverative   Thought Content:  negative thinking, ruminating thoughts   Perceptual Disturbances: none   Risk Potential: Suicidal ideation - Yes, passive death wish  Homicidal ideation - None  Potential for aggression - No   Sensorium:  oriented to person, place, and time/date   Memory:  recent and remote memory grossly intact   Consciousness:  alert and awake   Attention: decreased concentration and decreased attention span   Insight:  limited   Judgment: limited   Gait/Station: normal gait/station, normal balance   Motor Activity: no abnormal movements       History Review:The following portions of the patient's history were reviewed and updated as appropriate: psychiatric history, trauma history allergies, current medications, past family history, past medical history, past social history, past surgical history, and problem list   OBJECTIVE:     Vital signs in last 24 hours:    There were no vitals filed for this visit.  Laboratory Results: I have personally reviewed all pertinent laboratory/tests results.      Suicide/Homicide Risk Assessment:    The following interventions are recommended: no intervention changes needed. Although patient's acute lethality risk is low, long-term/chronic lethality risk is mildly elevated in the presence of worsening depression, passive death wish but no active suicidal thoughts. At the current moment, Lety is future-oriented, forward-thinking, and demonstrates ability to act in a self-preserving manner as evidenced by  volitionally presenting to the clinic today, seeking treatment. To mitigate future risk, patient should adhere to the recommendations below, avoid alcohol/illicit substance use, utilize community-based resources and familiar support and prioritize mental health treatment. Presently, patient denies active suicidal/homicidal ideation in addition to thoughts of self-injury; contracts for safety.  At conclusion of evaluation, patient is amenable to the recommendations below. Patient is amenable to calling/contacting the outpatient office including this writer if any acute adverse effects of their medication regimen arise in addition to any comments or concerns pertaining to their psychiatric management.  Patient is amenable to calling/contacting crisis and/or attending to the nearest emergency department if their clinical condition deteriorates to assure their safety and stability, stating that they are able to appropriately confide in their family and friends regarding their psychiatric state.    Assessment/Plan:     PLAN:  Diagnoses and all orders for this visit:    Current severe episode of major depressive disorder without psychotic features without prior episode (HCC)  -     buPROPion (WELLBUTRIN XL) 150 mg 24 hr tablet; Take 1 tablet (150 mg total) by mouth daily  -     FLUoxetine (PROzac) 20 MG tablet; Take 2 tablets (40 mg total) by mouth daily    Generalized anxiety disorder    PMDD (premenstrual dysphoric disorder)         Treatment Recommendations/Precautions:    Increase Prozac to 40 mg daily for worsening depression and symptoms of PMDD  Wellbutrin  mg daily for depression  Continue with individual therapy every 2 weeks  Follow-up with PCP for ongoing medical issues  Lab tests ordered and instructions given to patient.  Medication management every 6 weeks  Aware of 24 hour and weekend coverage for urgent situations accessed by calling [facility] main practice number  Patient  advised to call 911 if feeling suicidal or homicidal before acting out on their thoughts and they expressed understanding.    Medications Risks/Benefits      Risks, Benefits And Possible Side Effects Of Medications:    Discussed risks and benefits of treatment with patient including risk of suicidality, serotonin syndrome, increased QTc interval and SIADH related to treatment with antidepressants; Risk of induction of manic symptoms in certain patient populations         Controlled Medication Discussion:     Not applicable - controlled prescriptions are not prescribed by this practice    Psychotherapy Provided:     Individual psychotherapy provided: Medications, treatment progress and treatment plan reviewed with Lety.  Medication changes discussed with Lety.  Medication education provided to Lety.  Supportive therapy provided.   Crisis/safety plan discussed with Lety.     Treatment Plan:    Completed and signed during the session: Not applicable - Treatment Plan not due at this session    This note was not shared with the patient due to reasonable likelihood of causing patient harm      KETAN Calhoun 06/06/24

## 2024-07-03 ENCOUNTER — APPOINTMENT (OUTPATIENT)
Dept: LAB | Age: 24
End: 2024-07-03
Payer: COMMERCIAL

## 2024-07-03 DIAGNOSIS — R53.83 FATIGUE, UNSPECIFIED TYPE: ICD-10-CM

## 2024-07-03 DIAGNOSIS — F33.2 SEVERE EPISODE OF RECURRENT MAJOR DEPRESSIVE DISORDER, WITHOUT PSYCHOTIC FEATURES (HCC): ICD-10-CM

## 2024-07-03 LAB
25(OH)D3 SERPL-MCNC: 46.4 NG/ML (ref 30–100)
ALBUMIN SERPL BCG-MCNC: 4 G/DL (ref 3.5–5)
ALP SERPL-CCNC: 38 U/L (ref 34–104)
ALT SERPL W P-5'-P-CCNC: 11 U/L (ref 7–52)
ANION GAP SERPL CALCULATED.3IONS-SCNC: 8 MMOL/L (ref 4–13)
AST SERPL W P-5'-P-CCNC: 17 U/L (ref 13–39)
BASOPHILS # BLD AUTO: 0.02 THOUSANDS/ÂΜL (ref 0–0.1)
BASOPHILS NFR BLD AUTO: 1 % (ref 0–1)
BILIRUB SERPL-MCNC: 0.26 MG/DL (ref 0.2–1)
BUN SERPL-MCNC: 19 MG/DL (ref 5–25)
CALCIUM SERPL-MCNC: 9.2 MG/DL (ref 8.4–10.2)
CHLORIDE SERPL-SCNC: 102 MMOL/L (ref 96–108)
CO2 SERPL-SCNC: 27 MMOL/L (ref 21–32)
CREAT SERPL-MCNC: 0.76 MG/DL (ref 0.6–1.3)
EOSINOPHIL # BLD AUTO: 0.02 THOUSAND/ÂΜL (ref 0–0.61)
EOSINOPHIL NFR BLD AUTO: 1 % (ref 0–6)
ERYTHROCYTE [DISTWIDTH] IN BLOOD BY AUTOMATED COUNT: 14 % (ref 11.6–15.1)
FERRITIN SERPL-MCNC: 3 NG/ML (ref 11–307)
GFR SERPL CREATININE-BSD FRML MDRD: 110 ML/MIN/1.73SQ M
GLUCOSE SERPL-MCNC: 76 MG/DL (ref 65–140)
HCT VFR BLD AUTO: 37.2 % (ref 34.8–46.1)
HGB BLD-MCNC: 11.8 G/DL (ref 11.5–15.4)
IMM GRANULOCYTES # BLD AUTO: 0 THOUSAND/UL (ref 0–0.2)
IMM GRANULOCYTES NFR BLD AUTO: 0 % (ref 0–2)
IRON SATN MFR SERPL: 7 % (ref 15–50)
IRON SERPL-MCNC: 52 UG/DL (ref 50–212)
LYMPHOCYTES # BLD AUTO: 1.53 THOUSANDS/ÂΜL (ref 0.6–4.47)
LYMPHOCYTES NFR BLD AUTO: 44 % (ref 14–44)
MCH RBC QN AUTO: 27.6 PG (ref 26.8–34.3)
MCHC RBC AUTO-ENTMCNC: 31.7 G/DL (ref 31.4–37.4)
MCV RBC AUTO: 87 FL (ref 82–98)
MONOCYTES # BLD AUTO: 0.33 THOUSAND/ÂΜL (ref 0.17–1.22)
MONOCYTES NFR BLD AUTO: 10 % (ref 4–12)
NEUTROPHILS # BLD AUTO: 1.56 THOUSANDS/ÂΜL (ref 1.85–7.62)
NEUTS SEG NFR BLD AUTO: 44 % (ref 43–75)
NRBC BLD AUTO-RTO: 0 /100 WBCS
PLATELET # BLD AUTO: 252 THOUSANDS/UL (ref 149–390)
PMV BLD AUTO: 10.2 FL (ref 8.9–12.7)
POTASSIUM SERPL-SCNC: 4.3 MMOL/L (ref 3.5–5.3)
PROT SERPL-MCNC: 7 G/DL (ref 6.4–8.4)
RBC # BLD AUTO: 4.28 MILLION/UL (ref 3.81–5.12)
SODIUM SERPL-SCNC: 137 MMOL/L (ref 135–147)
TIBC SERPL-MCNC: 784 UG/DL (ref 250–450)
UIBC SERPL-MCNC: 732 UG/DL (ref 155–355)
WBC # BLD AUTO: 3.46 THOUSAND/UL (ref 4.31–10.16)

## 2024-07-03 PROCEDURE — 80053 COMPREHEN METABOLIC PANEL: CPT

## 2024-07-03 PROCEDURE — 82728 ASSAY OF FERRITIN: CPT

## 2024-07-03 PROCEDURE — 83550 IRON BINDING TEST: CPT

## 2024-07-03 PROCEDURE — 36415 COLL VENOUS BLD VENIPUNCTURE: CPT

## 2024-07-03 PROCEDURE — 83540 ASSAY OF IRON: CPT

## 2024-07-03 PROCEDURE — 82306 VITAMIN D 25 HYDROXY: CPT

## 2024-07-03 PROCEDURE — 85025 COMPLETE CBC W/AUTO DIFF WBC: CPT

## 2024-07-16 ENCOUNTER — TELEMEDICINE (OUTPATIENT)
Dept: PSYCHIATRY | Facility: CLINIC | Age: 24
End: 2024-07-16
Payer: COMMERCIAL

## 2024-07-16 DIAGNOSIS — F32.81 PMDD (PREMENSTRUAL DYSPHORIC DISORDER): ICD-10-CM

## 2024-07-16 DIAGNOSIS — F32.2 CURRENT SEVERE EPISODE OF MAJOR DEPRESSIVE DISORDER WITHOUT PSYCHOTIC FEATURES WITHOUT PRIOR EPISODE (HCC): Primary | ICD-10-CM

## 2024-07-16 DIAGNOSIS — E55.9 VITAMIN D DEFICIENCY: ICD-10-CM

## 2024-07-16 DIAGNOSIS — F41.1 GENERALIZED ANXIETY DISORDER: ICD-10-CM

## 2024-07-16 PROCEDURE — 99214 OFFICE O/P EST MOD 30 MIN: CPT | Performed by: NURSE PRACTITIONER

## 2024-07-16 RX ORDER — FLUOXETINE HYDROCHLORIDE 60 MG/1
60 TABLET, FILM COATED ORAL; ORAL DAILY
Qty: 90 TABLET | Refills: 1 | Status: SHIPPED | OUTPATIENT
Start: 2024-07-16

## 2024-07-16 NOTE — BH TREATMENT PLAN
TREATMENT PLAN (Medication Management Only)        [facility]    Name and Date of Birth:  Lety Sanderson 24 y.o. 2000  Date of Treatment Plan: July 16, 2024  Diagnosis/Diagnoses:    1. Current severe episode of major depressive disorder without psychotic features without prior episode (HCC)    2. Vitamin D deficiency    3. PMDD (premenstrual dysphoric disorder)    4. Generalized anxiety disorder      Strengths/Personal Resources for Self-Care: ability to communicate well, ability to reason, good understanding of illness.  Area/Areas of need (in own words): depressive symptoms  1. Long Term Goal: improve depression.  Target Date:6 months - 1/16/2025  Person/Persons responsible for completion of goal: Lety  2.  Short Term Objective (s) - How will we reach this goal?:   A. Provider new recommended medication/dosage changes and/or continue medication(s): continue current medications as prescribed.  B. Attend medication management appointments regularly.  C.  Referred for individual therapy .  Target Date:6 months - 1/16/2025  Person/Persons Responsible for Completion of Goal: Lety  Progress Towards Goals: continuing treatment  Treatment Modality: medication management every 6 weeks  Review due 180 days from date of this plan: 6 months - 1/16/2025  Expected length of service: ongoing treatment  My Physician/PA/NP and I have developed this plan together and I agree to work on the goals and objectives. I understand the treatment goals that were developed for my treatment.

## 2024-07-16 NOTE — PSYCH
Virtual Regular Visit    Verification of patient location:    Patient is located at Home in the following state in which I hold an active license [place]      Assessment/Plan:    Problem List Items Addressed This Visit       Vitamin D deficiency    Current severe episode of major depressive disorder without psychotic features without prior episode (HCC) - Primary    Relevant Medications    FLUoxetine 60 MG TABS    Anxiety disorder    Relevant Medications    FLUoxetine 60 MG TABS    PMDD (premenstrual dysphoric disorder)    Relevant Medications    FLUoxetine 60 MG TABS         Reason for visit is   Chief Complaint   Patient presents with    Virtual Regular Visit          Encounter provider KETAN Calhoun      Recent Visits  No visits were found meeting these conditions.  Showing recent visits within past 7 days and meeting all other requirements  Today's Visits  Date Type Provider Dept   07/16/24 Telemedicine KETAN Calhoun [facility] [address]   Showing today's visits and meeting all other requirements  Future Appointments  No visits were found meeting these conditions.  Showing future appointments within next 150 days and meeting all other requirements       The patient was identified by name and date of birth. Lety Sanderson was informed that this is a telemedicine visit and that the visit is being conducted throughthe [employer] platform. She agrees to proceed..  My office door was closed. No one else was in the room.  She acknowledged consent and understanding of privacy and security of the video platform. The patient has agreed to participate and understands they can discontinue the visit at any time.    Patient is aware this is a billable service.     MEDICATION MANAGEMENT NOTE        [facility]    Name and Date of Birth:  Lety Sanderson 24 y.o. 2000 MRN: [id number]    Date of Visit: July 16, 2024    Visit Time    Visit Start Time:  "1230   Visit Stop Time: 1300  Total Visit Duration:  30 minutes     The total visit duration detailed above includes: patient engagement, medication management, psychotherapy/counseling, discussion regarding treatment goals, and coordination of care.      Note Share Disclaimer:     This note was not shared with the patient due to reasonable likelihood of causing patient harm    Allergies   Allergen Reactions    Other      Other reaction(s): Asthma    Pollen Extract Allergic Rhinitis and Wheezing     SUBJECTIVE:    Lety is seen today for a follow up for Major Depressive Disorder and Generalized Anxiety Disorder. She continues to experience significant symptoms since the last visit.  Since last visit, I did increase her Prozac to 40 mg daily.  She reports that this dose increase has helped with the severity of her symptoms of PMDD.  But her overall depression has remained \"the same \".  Answers all my questions with the same spot response, the same.  She is denying any suicidal thoughts with plan or intent, ongoing passive death wish.  Feels kind of stuck in her life.  States things with her boyfriend are going okay, things at home are going okay, work is going okay-but not really feeling enjoyment in her life and hoping there is more.  She did feel better initially after the partial program earlier this year.  But improvements were not sustained.  She is on a waiting list for individual therapy.  She is willing for another dose increase of her Prozac to 60 mg.  At last visit, I also ordered lab work and does indicate she has low iron and low ferritin and I encouraged her to follow up with her PCP, as this may be the cause of her fatigue.  She verbalizes understanding.    She denies any side effects from current psychiatric medications.      HPI ROS Appetite Changes and Sleep:     She reports fluctuating sleep pattern, fluctuating appetite, fluctuating energy levels. Denies homicidal ideation, denies suicidal " ideation    Review Of Systems:   reports tiredness, all other systems are negative         Mental Status Evaluation:    Appearance:  age appropriate   Behavior:  pleasant, cooperative   Speech:  normal rate, normal volume   Mood:  depressed   Affect:  flat   Thought Process:  goal directed   Associations: intact associations   Thought Content:  no overt delusions   Perceptual Disturbances: none   Risk Potential: Suicidal ideation - None  Homicidal ideation - None  Potential for aggression - No   Sensorium:  oriented to person, place, and time/date   Memory:  recent memory intact   Consciousness:  alert and awake   Attention: attention span and concentration are age appropriate   Insight:  fair   Judgment: fair   Gait/Station: normal gait/station, normal balance   Motor Activity: no abnormal movements       History Review:The following portions of the patient's history were reviewed and updated as appropriate: psychiatric history, trauma history allergies, current medications, past family history, past medical history, past social history, past surgical history, and problem list   OBJECTIVE:     Vital signs in last 24 hours:    There were no vitals filed for this visit.  Laboratory Results: I have personally reviewed all pertinent laboratory/tests results.      Suicide/Homicide Risk Assessment:    The following interventions are recommended: no intervention changes needed. Although patient's acute lethality risk is low, long-term/chronic lethality risk is mildly elevated in the presence of ongoing depression. At the current moment, Lety is future-oriented, forward-thinking, and demonstrates ability to act in a self-preserving manner as evidenced by volitionally presenting to the clinic today, seeking treatment. To mitigate future risk, patient should adhere to the recommendations below, avoid alcohol/illicit substance use, utilize community-based resources and familiar support and prioritize mental health  treatment. Presently, patient denies active suicidal/homicidal ideation in addition to thoughts of self-injury; contracts for safety.  At conclusion of evaluation, patient is amenable to the recommendations below. Patient is amenable to calling/contacting the outpatient office including this writer if any acute adverse effects of their medication regimen arise in addition to any comments or concerns pertaining to their psychiatric management.  Patient is amenable to calling/contacting crisis and/or attending to the nearest emergency department if their clinical condition deteriorates to assure their safety and stability, stating that they are able to appropriately confide in their family and friends regarding their psychiatric state.    Assessment/Plan:     PLAN:  Diagnoses and all orders for this visit:    Current severe episode of major depressive disorder without psychotic features without prior episode (HCC)  -     FLUoxetine 60 MG TABS; Take 1 tablet (60 mg total) by mouth daily    Vitamin D deficiency    PMDD (premenstrual dysphoric disorder)    Generalized anxiety disorder         Treatment Recommendations/Precautions:    Increase Prozac to 60 mg daily for worsening depression and for PMDD  Continue Wellbutrin  mg daily  She is being referred for individual therapy  She is encouraged to follow up with her PCP for low iron and ferritin and fatigue.  Medication management every 6 weeks  Aware of 24 hour and weekend coverage for urgent situations accessed by calling [facility] main practice number  Patient advised to call 911 if feeling suicidal or homicidal before acting out on their thoughts and they expressed understanding.    Medications Risks/Benefits      Risks, Benefits And Possible Side Effects Of Medications:    Discussed risks and benefits of treatment with patient including risk of suicidality, serotonin syndrome, increased QTc interval and SIADH related to treatment with  antidepressants; Risk of induction of manic symptoms in certain patient populations         Controlled Medication Discussion:     Not applicable - controlled prescriptions are not prescribed by this practice    Psychotherapy Provided:     Individual psychotherapy provided: Medications, treatment progress and treatment plan reviewed with Lety.  Medication changes discussed with Lety.  Medication education provided to Lety.  Reassurance and supportive therapy provided.      Treatment Plan:    Completed and signed during the session: Not applicable - Treatment Plan not due at this session    This note was not shared with the patient due to reasonable likelihood of causing patient harm      KETAN Calhoun 07/16/24

## 2024-07-18 ENCOUNTER — OFFICE VISIT (OUTPATIENT)
Dept: INTERNAL MEDICINE CLINIC | Facility: CLINIC | Age: 24
End: 2024-07-18
Payer: COMMERCIAL

## 2024-07-18 VITALS
HEIGHT: 62 IN | HEART RATE: 97 BPM | WEIGHT: 122 LBS | DIASTOLIC BLOOD PRESSURE: 60 MMHG | SYSTOLIC BLOOD PRESSURE: 110 MMHG | BODY MASS INDEX: 22.45 KG/M2 | OXYGEN SATURATION: 97 %

## 2024-07-18 DIAGNOSIS — F32.2 CURRENT SEVERE EPISODE OF MAJOR DEPRESSIVE DISORDER WITHOUT PSYCHOTIC FEATURES WITHOUT PRIOR EPISODE (HCC): ICD-10-CM

## 2024-07-18 DIAGNOSIS — J45.20 MILD INTERMITTENT ASTHMA WITHOUT COMPLICATION: ICD-10-CM

## 2024-07-18 DIAGNOSIS — D70.9 NEUTROPENIA, UNSPECIFIED TYPE (HCC): ICD-10-CM

## 2024-07-18 DIAGNOSIS — E61.1 IRON DEFICIENCY: Primary | ICD-10-CM

## 2024-07-18 DIAGNOSIS — F41.1 GENERALIZED ANXIETY DISORDER: ICD-10-CM

## 2024-07-18 PROCEDURE — 99214 OFFICE O/P EST MOD 30 MIN: CPT | Performed by: INTERNAL MEDICINE

## 2024-07-18 RX ORDER — FERROUS SULFATE 324(65)MG
324 TABLET, DELAYED RELEASE (ENTERIC COATED) ORAL EVERY OTHER DAY
Qty: 45 TABLET | Refills: 0 | Status: SHIPPED | OUTPATIENT
Start: 2024-07-18

## 2024-07-18 NOTE — PATIENT INSTRUCTIONS
-Your iron levels are very low.  You will be started on ferrous sulfate 324 mg every other day for 3 months.  At the end of this please go for a repeat iron profile and a CBC.  -You have also been given a lab prescription to check a urinalysis in addition to an occult stool for sources of blood loss.

## 2024-07-18 NOTE — ASSESSMENT & PLAN NOTE
-Patient has been given lab slip to check a urinalysis and fecal occult stool.  -Start ferrous sulfate 324 mg every other day for 3 months followed by a repeat iron profile.

## 2024-07-18 NOTE — PROGRESS NOTES
Name: Lety Sanderson      : 2000      MRN: [id number]  Encounter Provider: Keny Reese MD  Encounter Date: 2024   Enc[facility] department[facility]    Assessment & Plan     1. Iron deficiency  Assessment & Plan:  -Patient has been given lab slip to check a urinalysis and fecal occult stool.  -Start ferrous sulfate 324 mg every other day for 3 months followed by a repeat iron profile.  Orders:  -     Occult Blood, Fecal Immunochemical; Future  -     ferrous sulfate 324 (65 Fe) mg; Take 1 tablet (324 mg total) by mouth every other day  -     Iron Panel (Includes Ferritin, Iron Sat%, Iron, and TIBC); Future; Expected date: 10/18/2024  -     Urinalysis with microscopic; Future  2. Mild intermittent asthma without complication  Assessment & Plan:  -Well controlled   -Continue inhalers as prescribed   3. Current severe episode of major depressive disorder without psychotic features without prior episode (HCC)  Assessment & Plan:  -Med management per psychiatry.  4. Generalized anxiety disorder  Assessment & Plan:  -Well controlled   -Appreciate psychiatry follow-up   5. Neutropenia, unspecified type (HCC)  Assessment & Plan:  -Very mild neutropenia with an ANC of 1500.  Repeat CBC in 3 months.  Orders:  -     CBC and differential; Future; Expected date: 10/18/2024         Subjective      HPI  Patient presents today for follow-up of her labs.  She reports she recently had blood work ordered through her psychiatrist which revealed significant iron deficiency.  Patient denies any history of menorrhagia, blood in her stools or hematuria.  She reports she rarely eats red meat but does eat chicken every other day.  She denies any abdominal symptoms such as bloating, diarrhea or constipation.    All other systems negative except for pertinent findings noted in HPI.       Current Outpatient Medications on File Prior to Visit   Medication Sig   • buPROPion (WELLBUTRIN XL) 150 mg 24 hr  "tablet Take 1 tablet (150 mg total) by mouth daily   • FLUoxetine 60 MG TABS Take 1 tablet (60 mg total) by mouth daily   • levalbuterol (XOPENEX HFA) 45 mcg/act inhaler Inhale 1-2 puffs every 4 (four) hours as needed for wheezing   • norgestimate-ethinyl estradiol (Tri-Lo-Sprintec) 0.18/0.215/0.25 MG-25 MCG per tablet Take 1 tablet by mouth daily   • cholecalciferol (VITAMIN D3) 1,000 units tablet Take 1 tablet (1,000 Units total) by mouth daily Do not start before October 31, 2023.   • cyanocobalamin (VITAMIN B-12) 1000 MCG tablet Take 1 tablet (1,000 mcg total) by mouth daily Do not start before October 31, 2023.       Objective     /60   Pulse 97   Ht 5' 2\" (1.575 m)   Wt 55.3 kg (122 lb)   LMP 07/03/2024   SpO2 97%   BMI 22.31 kg/m²     BP Readings from Last 3 Encounters:   07/18/24 110/60   02/26/24 98/56   11/03/23 106/73        Wt Readings from Last 3 Encounters:   07/18/24 55.3 kg (122 lb)   02/26/24 57.2 kg (126 lb)   11/03/23 58.7 kg (129 lb 6.4 oz)       Physical Exam    General: NAD  HEENT: NCAT, EOMI, normal conjunctiva  Cardiovascular: RRR, normal S1 and S2, no m/r/g  Pulmonary: Normal respiratory effort, no wheezes, rales or rhonchi  Extremities: No lower extremity edema  Skin: Normal skin color, no rashes     Keny Reese MD  "

## 2024-08-20 ENCOUNTER — TELEPHONE (OUTPATIENT)
Dept: BEHAVIORAL/MENTAL HEALTH CLINIC | Facility: CLINIC | Age: 24
End: 2024-08-20

## 2024-08-20 NOTE — TELEPHONE ENCOUNTER
Pt contacted the office in regards to therapy services and stated they still would like to have therapy with Isabelle. Writer contacted the [facility] office to discuss this. Pt was made aware that the 9/5 at 11am with Isabelle will be switched to NP and if pt cancels again, they will be placed back on the TT wait list.

## 2024-08-27 ENCOUNTER — TELEMEDICINE (OUTPATIENT)
Dept: PSYCHIATRY | Facility: CLINIC | Age: 24
End: 2024-08-27
Payer: COMMERCIAL

## 2024-08-27 DIAGNOSIS — E61.1 IRON DEFICIENCY: ICD-10-CM

## 2024-08-27 DIAGNOSIS — F32.81 PMDD (PREMENSTRUAL DYSPHORIC DISORDER): ICD-10-CM

## 2024-08-27 DIAGNOSIS — F34.1 PRIMARY DYSTHYMIA EARLY ONSET: ICD-10-CM

## 2024-08-27 DIAGNOSIS — F41.1 GENERALIZED ANXIETY DISORDER: ICD-10-CM

## 2024-08-27 DIAGNOSIS — F32.2 CURRENT SEVERE EPISODE OF MAJOR DEPRESSIVE DISORDER WITHOUT PSYCHOTIC FEATURES WITHOUT PRIOR EPISODE (HCC): Primary | ICD-10-CM

## 2024-08-27 PROCEDURE — 99214 OFFICE O/P EST MOD 30 MIN: CPT | Performed by: NURSE PRACTITIONER

## 2024-08-27 NOTE — PSYCH
Virtual Regular Visit    Verification of patient location:    Patient is located at Home in the following state in which I hold an active license [place]      Assessment/Plan:    Problem List Items Addressed This Visit       Primary dysthymia early onset    Current severe episode of major depressive disorder without psychotic features without prior episode (HCC) - Primary    Anxiety disorder    PMDD (premenstrual dysphoric disorder)    Iron deficiency         Reason for visit is   Chief Complaint   Patient presents with    Virtual Regular Visit          Encounter provider KETAN Calhoun      Recent Visits  No visits were found meeting these conditions.  Showing recent visits within past 7 days and meeting all other requirements  Today's Visits  Date Type Provider Dept   08/27/24 Telemedicine KETAN Calhoun [facility] [address]   Showing today's visits and meeting all other requirements  Future Appointments  No visits were found meeting these conditions.  Showing future appointments within next 150 days and meeting all other requirements       The patient was identified by name and date of birth. Lety Sanderson was informed that this is a telemedicine visit and that the visit is being conducted throughthe Docebo platform. She agrees to proceed..  My office door was closed. No one else was in the room.  She acknowledged consent and understanding of privacy and security of the video platform. The patient has agreed to participate and understands they can discontinue the visit at any time.    Patient is aware this is a billable service.     MEDICATION MANAGEMENT NOTE        [facility]    Name and Date of Birth:  Lety Sanderson 24 y.o. 2000 MRN: [id number]    Date of Visit: August 27, 2024    Total Visit Duration:  30 minutes     The total visit duration detailed above includes: patient engagement, medication management,  "psychotherapy/counseling, discussion regarding treatment goals, and coordination of care.      Note Share Disclaimer:     This note was not shared with the patient due to reasonable likelihood of causing patient harm    Allergies   Allergen Reactions    Other      Other reaction(s): Asthma    Pollen Extract Allergic Rhinitis and Wheezing     SUBJECTIVE:    Leyt is seen today for a follow up for Major Depressive Disorder, Generalized Anxiety Disorder, Panic Disorder, and Social Phobia. She continues to improve gradually since the last visit.  She reports some mild improvement since last visit, when we increased her Prozac to 60 mg.  She states her mom notes some decrease in irritability.  She notes some decrease in the frequency and intensity of symptoms of PMDD and between ovulation and the start of her period.  She states she still feels quite negative.  Denying any active suicidal thoughts but does occasionally have passive death wish or wishing she were never born.  She works evening shift, has some friends at work, does not feel she is living up to her potential in her current position.  She has a boyfriend states that relationship is going well.  states he is an optimist and she is a pessimist so they balance each other out.  She had to cancel to therapy appointments due to being called into work, she very much wants to start therapy and confirms that she will not miss her next appointment.  States sleep is \"too good\" states she likes to sleep.  Appetite is fair.  Motivation is low.    She denies any side effects from current psychiatric medications.      HPI ROS Appetite Changes and Sleep:     She reports fluctuating sleep pattern, fluctuating appetite, fluctuating energy levels. Denies homicidal ideation, denies suicidal ideation    Review Of Systems:   no complaints, all other systems are negative , except as noted above         Mental Status Evaluation:    Appearance:  age appropriate   Behavior:  " pleasant, cooperative   Speech:  normal rate, normal volume   Mood:  depressed   Affect:  flat   Thought Process:  goal directed   Associations: intact associations   Thought Content:  no overt delusions   Perceptual Disturbances: none   Risk Potential: Suicidal ideation - None, but passive death wish occasionally  Homicidal ideation - None  Potential for aggression - No   Sensorium:  oriented to person, place, and time/date   Memory:  recent and remote memory grossly intact   Consciousness:  alert and awake   Attention: decreased concentration and decreased attention span   Insight:  limited   Judgment: limited   Gait/Station: normal gait/station, normal balance   Motor Activity: no abnormal movements       History Review:The following portions of the patient's history were reviewed and updated as appropriate: psychiatric history, trauma history allergies, current medications, past family history, past medical history, past social history, past surgical history, and problem list   OBJECTIVE:     Vital signs in last 24 hours:    There were no vitals filed for this visit.  Laboratory Results: I have personally reviewed all pertinent laboratory/tests results.      Suicide/Homicide Risk Assessment:    The following interventions are recommended: no intervention changes needed. Although patient's acute lethality risk is low, long-term/chronic lethality risk is mildly elevated in the presence of ongoing depression and passive death wish. At the current moment, Lety is future-oriented, forward-thinking, and demonstrates ability to act in a self-preserving manner as evidenced by volitionally presenting to the clinic today, seeking treatment. To mitigate future risk, patient should adhere to the recommendations below, avoid alcohol/illicit substance use, utilize community-based resources and familiar support and prioritize mental health treatment. Presently, patient denies active suicidal/homicidal ideation in addition to  thoughts of self-injury; contracts for safety.  At conclusion of evaluation, patient is amenable to the recommendations below. Patient is amenable to calling/contacting the outpatient office including this writer if any acute adverse effects of their medication regimen arise in addition to any comments or concerns pertaining to their psychiatric management.  Patient is amenable to calling/contacting crisis and/or attending to the nearest emergency department if their clinical condition deteriorates to assure their safety and stability, stating that they are able to appropriately confide in their family and friends regarding their psychiatric state.    Assessment/Plan:     PLAN:  Diagnoses and all orders for this visit:    Current severe episode of major depressive disorder without psychotic features without prior episode (HCC)    Generalized anxiety disorder    PMDD (premenstrual dysphoric disorder)    Iron deficiency    Primary dysthymia early onset         Treatment Recommendations/Precautions:    Continue Prozac 60 mg daily for depression and anxiety and symptoms of PMDD  Continue Wellbutrin  mg daily for depression.  Medication management every 2 months  Referral for individual psychotherapy  Aware of 24 hour and weekend coverage for urgent situations accessed by calling [facility] main practice number  Patient advised to call 911 if feeling suicidal or homicidal before acting out on their thoughts and they expressed understanding.    Medications Risks/Benefits      Risks, Benefits And Possible Side Effects Of Medications:    Discussed risks and benefits of treatment with patient including risk of suicidality, serotonin syndrome, increased QTc interval and SIADH related to treatment with antidepressants; Risk of induction of manic symptoms in certain patient populations         Controlled Medication Discussion:     Not applicable - controlled prescriptions are not prescribed by this  practice    Psychotherapy Provided:     Individual psychotherapy provided: Medications, treatment progress and treatment plan reviewed with Lety.  Medication changes discussed with Lety.  Medication education provided to Ltey.     Treatment Plan:    Completed and signed during the session: Not applicable - Treatment Plan not due at this session    This note was not shared with the patient due to reasonable likelihood of causing patient harm      KETAN Calhoun 08/27/24

## 2024-09-05 ENCOUNTER — OFFICE VISIT (OUTPATIENT)
Dept: BEHAVIORAL/MENTAL HEALTH CLINIC | Facility: CLINIC | Age: 24
End: 2024-09-05
Payer: COMMERCIAL

## 2024-09-05 DIAGNOSIS — F32.2 CURRENT SEVERE EPISODE OF MAJOR DEPRESSIVE DISORDER WITHOUT PSYCHOTIC FEATURES WITHOUT PRIOR EPISODE (HCC): Primary | ICD-10-CM

## 2024-09-05 DIAGNOSIS — F34.1 PRIMARY DYSTHYMIA EARLY ONSET: ICD-10-CM

## 2024-09-05 DIAGNOSIS — F41.1 GENERALIZED ANXIETY DISORDER: ICD-10-CM

## 2024-09-05 PROCEDURE — 90791 PSYCH DIAGNOSTIC EVALUATION: CPT

## 2024-09-05 NOTE — PSYCH
" Behavioral Health Psychotherapy Assessment    Date of Initial Psychotherapy Assessment: 09/05/24  Referral Source: KETAN Calhoun  Has a release of information been signed for the referral source? NA    Preferred Name: Lety Sanderson  Preferred Pronouns: She/her  YOB: 2000 Age: 24 y.o.  Sex assigned at birth: female   Gender Identity: Female  Race:   Preferred Language: English    Emergency Contact:  Full Name:   Komal Sanderson     Relationship to Client: Mother  Contact information: [phone]     Primary Care Physician:  Keny Reese MD  [id number] [place] [id number] [phone]  Has a release of information been signed? NA    Physical Health History:  Past surgical procedures: Denies.   Do you have a history of any of the following: other Denies.   Do you have any mobility issues? No    Relevant Family History:  Denies states \"I'm adopted and don't know my biological family\".     Presenting Problem (What brings you in?)  Lety is a 24 year old female seeking therapy services referred by her psychiatrist, Jovita ACEVES. Lety is seeking help due to her severe depression, anxiety, and self-confidence issues. 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 She spoke about not liking the shift she works due to her being the only one there, which increases her isolation, as well as working on-call hours/weekends/holidays. She explained that her main source of support is her boyfriend, Yazan, as well as her mother Komal and her aunt Carissa. Lety has two close friends Nicole and Chichi that she also leans on for support. Lety reports having suicidal ideation \"everyday\", no plan or intent. She stated it's feelings of hopelessness such as \"I wish I could get struck by lightning\" or \"I wish I could get hit by a car\". She has never done anything to try to hurt herself, but is genuinely not happy with life. " "Lety explained that she struggles with her self-confidence and not feeling like she's \"smart\". She explained that she is adopted and feels pressures of life to try to be \"successful\". Lety and therapist spoke about what she may want her goals to be and will cover this in next session with the treatment plan.     Mental Health Advance Directive:  Do you currently have a Mental Health Advance Directive?no    Diagnosis:   Diagnosis ICD-10-CM Associated Orders   1. Current severe episode of major depressive disorder without psychotic features without prior episode ([facility])  F32.2       2. Generalized anxiety disorder  F41.1       3. Primary dysthymia early onset  F34.1           Initial Assessment:     Current Mental Status:    Appearance: appropriate      Behavior/Manner: cooperative      Affect/Mood:  Depressed    Speech:  Normal    Sleep:  Normal    Oriented to: oriented to self, oriented to place and oriented to time       Clinical Symptoms    Depression: yes      Anxiety: yes      Depression Symptoms: restlessness, suicidal ideation, excessive crying, social isolation, fatigue, poor concentration and irritable      Anxiety Symptoms: excessive worry, irritable, difficulty controlling worry and restlessness      Have you ever been assaultive to others or the environment: No      Have you ever been self-injurious: No      Counseling History:  Previous Counseling or Treatment  (Mental Health or Drug & Alcohol): Yes    Previous Counseling Details:  [facility] -Since middle/high school until recently (2024)   [facility] (partial program) - October 2023  Have you previously taken psychiatric medications: Yes    Previous Medications Attempted:  See chart.    Suicide Risk Assessment  Have you ever had a suicide attempt: No    Have you had incidents of suicidal ideation: Yes    Are you currently experiencing suicidal thoughts: Yes    Additional Suicide Risk Information:  Passsive thoughts, no " "plan or intent. Passive thoughts include \"I wish lightning would strike me or I would get hit by a car\". She has not done anything in order to put herself in danger, but doesn't feel any self-worth in life and states that \"I'm not smart\".     Substance Abuse/Addiction Assessment:  Alcohol: No    Heroin: No    Fentanyl: No    Opiates: No    Cocaine: No    Amphetamines: No    Hallucinogens: No    Club Drugs: No    Benzodiazepines: No    Other Rx Meds: No    Marijuana: No    Tobacco/Nicotine: No    Have you experienced blackouts as a result of substance use: No    Have you had any periods of abstinence: No    Have you experienced symptoms of withdrawal: No    Have you ever overdosed on any substances?: No    Are you currently using any Medication Assisted Treatment for Substance Use: No      Compulsive Behaviors:  Compulsive Behaviors:  Pornography  Compulsive Behavior Information:  Previous addiction in younger years of sending pictures of herself to older men when in middle and high school. Does not do that anymore and hasn't \"for years\". Reports that she does not have any other behaviors currently.      Disordered Eating History:  Do you have a history of disordered eating: No      Social Determinants of Health:    SDOH:  Financial instability, social isolation, stress, unemployment/underemployment and housing needs/homelessness    Trauma and Abuse History:    Have you ever been abused: No      Legal History:    Have you ever been arrested  or had a DUI: No      Have you been incarcerated: No      Are you currently on parole/probation: No      Any current Children and Youth involvement: No      Any pending legal charges: No      Relationship History:    Current marital status: single      Natural Supports:  Other, mother and friends    Other natural supports:  Yazan- boyfriend    Relationship History:  Slade - friends   Komal and Carissa - mother and aunt     Employment History    Are you currently " employed: Yes      Longest period of employment:  [employer]    Employer/ Job title:  Medical Technologist    Future work goals:  Wanting to explore careers and find what she wants to do in life long term.    Sources of income/financial support:  Work     History:      Status: no history of  duty  Educational History:     Have you ever been diagnosed with a learning disability: No      Highest level of education:  Bachelor's Degree    School attended/attending:  [employer]    Have you ever had an IEP or 504-plan: No      Do you need assistance with reading or writing: No      Recommended Treatment:     Psychotherapy:  Individual sessions    Frequency:  2 times    Session frequency:  Monthly      Visit start and stop times:    09/05/24  Start Time: 1100  Stop Time: 1200  Total Visit Time: 60 minutes

## 2024-09-19 ENCOUNTER — SOCIAL WORK (OUTPATIENT)
Dept: BEHAVIORAL/MENTAL HEALTH CLINIC | Facility: CLINIC | Age: 24
End: 2024-09-19
Payer: COMMERCIAL

## 2024-09-19 DIAGNOSIS — F34.1 PRIMARY DYSTHYMIA EARLY ONSET: ICD-10-CM

## 2024-09-19 DIAGNOSIS — F41.1 GENERALIZED ANXIETY DISORDER: Primary | ICD-10-CM

## 2024-09-19 DIAGNOSIS — F32.2 CURRENT SEVERE EPISODE OF MAJOR DEPRESSIVE DISORDER WITHOUT PSYCHOTIC FEATURES WITHOUT PRIOR EPISODE (HCC): ICD-10-CM

## 2024-09-19 PROCEDURE — 90837 PSYTX W PT 60 MINUTES: CPT

## 2024-09-19 NOTE — BH TREATMENT PLAN
"Outpatient Behavioral Health Psychotherapy Treatment Plan    Lety Sanderson  2000     Date of Initial Psychotherapy Assessment: 09/05/2024   Date of Current Treatment Plan: 09/19/24  Treatment Plan Target Date: 03/19/2025  Treatment Plan Expiration Date: 03/19/2025    Diagnosis:   1. Generalized anxiety disorder        2. 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 Primary dysthymia early onset            Area(s) of Need: Housing, New job    Long Term Goal 1 (in the client's own words): \" I don't feel happy in the current job I'm in. I want to find another job.\"    Stage of Change: Preparation    Target Date for completion: 03/19/2025     Anticipated therapeutic modalities: Engagement Strategies, Client-centered Therapy, Cognitive Behavioral Therapy, Solution-Focused Therapy, and Supportive Psychotherapy     People identified to complete this goal: Lety and this therapistIsabelle.       Objective 1: (identify the means of measuring success in meeting the objective): A. Lety will utilize online job boards to search for jobs she would like to apply to. BJeff Davidson will work on applying to at least 5 jobs per week in order to hopefully complete this goal. CJeff Davidson and therapist will work on interviewing skills/tips when in biweekly sessions. D. Lety and therapist will work on self-confidence as well as being able to \"sell herself\" in regards to job interviewing.        Long Term Goal 2 (in the client's own words): \"I want to have my own apartment to share with my boyfriend, but I need a day shift job\"    Stage of Change: Preparation    Target Date for completion: 03/19/2025     Anticipated therapeutic modalities: Engagement Strategies, Client-centered Therapy, Cognitive Behavioral Therapy, Solution-Focused Therapy, and Supportive Psychotherapy     People identified to complete this goal: Lety and this therapistIsabelle.       Objective " 1: (identify the means of measuring success in meeting the objective): A. Lety and her boyfriend will continue to look online/in person at apartments in order to prepare financially and get ideas where to live. B. Lety and her boyfriend will discuss what options they would like when it comes to amenities as well as pet friendly, etc. C. Lety and her boyfriend would like to keep the rent no more than $1600.     I am currently under the care of a [facility] psychiatric provider: yes    My [facility] psychiatric provider is: KETAN Calhoun    I am currently taking psychiatric medications: Yes, as prescribed    I feel that I will be ready for discharge from mental health care when I reach the following (measurable goal/objective): When Lety is able to apply learned skills/techniques without prompting or support.     For children and adults who have a legal guardian:   Has there been any change to custody orders and/or guardianship status? NA. If yes, attach updated documentation.    I have created my Crisis Plan and have been offered a copy of this plan    Behavioral Health Treatment Plan [facility]: Diagnosis and Treatment Plan explained to Lety Sanderson acknowledges an understanding of their diagnosis. Lety Sanderson agrees to this treatment plan.    I have been offered a copy of this Treatment Plan. yes         .

## 2024-09-19 NOTE — BH CRISIS PLAN
Client Name: Lety Sanderson       Client YOB: 2000    NithinMathew Safety Plan      Creation Date: 9/19/24 Update Date: 9/19/25   Created By: Isabelle Reeder LPC Last Updated By: Isabelle Reeder LPC      Step 1: Warning Signs:   Warning Signs   My period   Social media   Holidays/my birthday            Step 2: Internal Coping Strategies:   Internal Coping Strategies   Call a friend   With my boyfrend   Sleep            Step 3: People and social settings that provide distraction:   Name Contact Information   Mom- Komal Number in cell phone   Aunt- Carissa Number in cell phone   Best friend- Nicole Number in cell phone    Places   Nature           Step 4: People whom I can ask for help during a crisis:      Name Contact Information    Komal Sanderson - Mother [phone]    Carissa - Aunt [phone]      Step 5: Professionals or agencies I can contact during a crisis:      Clinican/Agency Name Phone Emergency Contact    KETAN Calhoun [phone]     Isabelle Reeder -[phone]       Local Emergency Department Emergency Department Phone Emergency Department Address    [facility] [phone] [address] [place] [address] 180        Crisis Phone Numbers:   Suicide Prevention Lifeline: Call or Text  888 Crisis Text Line: Text HOME to 968-173   Please note: Some [facility] do not have a separate number for Child/Adolescent specific crisis. If your county is not listed under Child/Adolescent, please call the adult number for your county      Adult Crisis Numbers: Child/Adolescent Crisis Numbers   [facility]: [phone] [facility]: [phone]   [facility]: [phone] [facility]: [phone]   [facility]: [phone] [place]: [phone]   [facility]: [phone] [place] [facility]: [phone]   [place] [facility]: [phone]   [facility]: [phone]   [facility]: [phone]   [facility]: [phone]  (daytime) [phone] (after hours, weekends, holidays)      Step 6: Making the environment safer (plan for lethal means safety):   Patient did not identify any lethal methods: Yes     Optional: What is most important to me and worth living for?   Family and friends     Yordan Safety Plan. María Weller and Neto Carmona. Used with permission of the authors.

## 2024-09-19 NOTE — PSYCH
"Behavioral Health Psychotherapy Progress Note    Psychotherapy Provided: Individual Psychotherapy     1. Generalized anxiety disorder        2. 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 Primary dysthymia early onset            Goals addressed in session: Goal 1 and Goal 2     DATA:     Lety and therapist spent most of the session working on and completing the treatment plan and the crisis plan. Therapist and Lety spent the last 15 minutes speaking about her wants/needs in regards to needing help with resume/job hunting/interviewing skills. Lety is going on a vacation to [place] when she would have had next session. She and therapist will continue to work on her goals and discuss progress/stressors in next scheduled session.     During this session, this clinician used the following therapeutic modalities: Engagement Strategies, Client-centered Therapy, Cognitive Behavioral Therapy, Solution-Focused Therapy, and Supportive Psychotherapy    Substance Abuse was not addressed during this session. If the client is diagnosed with a co-occurring substance use disorder, please indicate any changes in the frequency or amount of use: N/A. Stage of change for addressing substance use diagnoses: No substance use/Not applicable    ASSESSMENT:  Lety Sanderson presents with a Depressed mood.     her affect is Normal range and intensity and Flat, which is congruent, with her mood and the content of the session. The client has made progress on their goals.     Lety Sanderson presents with a none risk of suicide, none risk of self-harm, and none risk of harm to others.    For any risk assessment that surpasses a \"low\" rating, a safety plan must be developed.    A safety plan was indicated: no  If yes, describe in detail     PLAN: Between sessions, Lety Sanderson will continue to work on the above mentioned for next session. At the next session, the " therapist will use Engagement Strategies, Client-centered Therapy, Cognitive Behavioral Therapy, Solution-Focused Therapy, and Supportive Psychotherapy to address her stressors.    Behavioral Health Treatment Plan and Discharge Planning: Lety Sanderson is aware of and agrees to continue to work on their treatment plan. They have identified and are working toward their discharge goals. yes    Visit start and stop times:    09/19/24  Start Time: 1100  Stop Time: 1202  Total Visit Time: 62 minutes

## 2024-09-28 ENCOUNTER — TELEPHONE (OUTPATIENT)
Dept: OTHER | Facility: OTHER | Age: 24
End: 2024-09-28

## 2024-09-28 NOTE — TELEPHONE ENCOUNTER
"Pt stated, \" I need my medication refilled.\"     norgestimate-ethinyl estradiol (Tri-Lo-Sprintec) 0.18/0.215/0.25 MG-25 MCG per tabletDose: 1 tablet Route: Oral Frequency: Daily  Dispense Quantity: 84 tablet Refil       Please follow up, thank you!  "

## 2024-09-30 DIAGNOSIS — Z30.41 ENCOUNTER FOR SURVEILLANCE OF CONTRACEPTIVE PILLS: ICD-10-CM

## 2024-09-30 RX ORDER — NORGESTIMATE AND ETHINYL ESTRADIOL 7DAYSX3 LO
1 KIT ORAL DAILY
Qty: 84 TABLET | Refills: 3 | Status: CANCELLED | OUTPATIENT
Start: 2024-09-30

## 2024-09-30 RX ORDER — NORGESTIMATE AND ETHINYL ESTRADIOL 7DAYSX3 LO
1 KIT ORAL DAILY
Qty: 84 TABLET | Refills: 0 | Status: SHIPPED | OUTPATIENT
Start: 2024-09-30 | End: 2024-10-08

## 2024-10-08 DIAGNOSIS — Z30.41 ENCOUNTER FOR SURVEILLANCE OF CONTRACEPTIVE PILLS: ICD-10-CM

## 2024-10-08 RX ORDER — NORGESTIMATE AND ETHINYL ESTRADIOL
1 KIT DAILY
Qty: 84 TABLET | Refills: 1 | Status: SHIPPED | OUTPATIENT
Start: 2024-10-08

## 2024-10-25 DIAGNOSIS — Z00.6 ENCOUNTER FOR EXAMINATION FOR NORMAL COMPARISON OR CONTROL IN CLINICAL RESEARCH PROGRAM: ICD-10-CM

## 2024-10-29 NOTE — PSYCH
Date of Initial Treatment Plan: 1/13/2014  Date of Current Treatment Plan: 8/24/2016  Treatment Plan 5  Strengths/Personal Resources for Self Care: Nice, active, devoted, committed, self-disciplined, tolerant, sense of humor, good friend, adaptable  Diagnosis:   Axis I: Adjustment Disorder with depressiona dn anxiety     Area of Needs: I am angry (with myself) and I am indecisive  Long Term Goals:   I want to have an opinion about things  Target Date: 12/24/2016      I want to feel better  Short Term Objectives:   Goal 1:   I will continue to use therapy to express my feelings about life, friends, school, and my future  I will practice making decisions and having opinions  I will discuss my career interests, and ambitions in therapy  Target Date: 11/24/2016      Goal 2:   I will use therapy to explore my thoughts and feelings about my self image and my sexuality  GOAL 1: Modality: Individual 2 x per month Target Date: 12/24/2016       The person(s) responsible for carrying out the plan is Elmer Saul  GOAL 2: Modality: Individual 2 x per month Target Date: 12/24/2016       The person(s) responsible for carrying out the plan is Tamra Owen  The first scheduled review date is 12/24/2016  The expected length of service is 6 mos  1  Adjustment disorder with mixed anxiety and depressed mood (309 28) (F43 23)   2  Adopted child   3  Allergic rhinitis (477 9) (J30 9)   4  Asthma (493 90) (J45 909)   5  Asthma exacerbation, mild (493 92) (J45 901)   6  Fatigue (780 79) (R53 83)   7  Menorrhagia (626 2) (N92 0)   8  Obesity (278 00) (E66 9)   9  Orthostatic dizziness (780 4) (R42)   10  Pharyngitis (462) (J02 9)   11  Seasonal allergies (477 9) (J30 2)   12   Vitamin D insufficiency (268 9) (E55 9)     Electronically signed by : Zoltan Cisneros LCSW; Aug 24 2016  5:41PM EST                       (Author)
Progress Note  Psychotherapy Provided [facility]: Individual Psychotherapy +50 minutes provided today  Goals addressed in session:   1/2  Pt was seen for Individual Therapy Session  Carisa Conrad completed PHQ-9  She admitted that she has had thoughts of suicidality, stating that her mother is not in agreement with medication for depression as she fears that it will "intensify" these thoughts  Carisa Conrad denied intent or a plan and is open to talking about feelings of guilt and anger at herself for her recent decisions in session  A: Carisa Conrad presents as depressed despite a low PHQ-9 score  Her mother is fearful of medication, but is very concerned for her daughter's health and well-being  P: Upcoming sessions will be used to continue to process Lety's feelings about the above and her level of stability will also be assessed  Dee Dee will be referred to this clinic's Child Psychiatrist        Pain Scale and Suicide Risk [facility]: Current Pain Assessment: no pain   Current suicide risk is low   Behavioral Health Treatment Plan [employer]: Diagnosis and Treatment Plan explained to patient, patient relates understanding diagnosis and is agreeable to Treatment Plan            Signatures   Electronically signed by : Aylin Gomez LCSW; Aug 24 2016  6:47PM EST                       (Author)
none
bipolar affective disorder

## 2024-10-31 ENCOUNTER — SOCIAL WORK (OUTPATIENT)
Dept: BEHAVIORAL/MENTAL HEALTH CLINIC | Facility: CLINIC | Age: 24
End: 2024-10-31
Payer: COMMERCIAL

## 2024-10-31 DIAGNOSIS — F32.2 CURRENT SEVERE EPISODE OF MAJOR DEPRESSIVE DISORDER WITHOUT PSYCHOTIC FEATURES WITHOUT PRIOR EPISODE (HCC): ICD-10-CM

## 2024-10-31 DIAGNOSIS — F34.1 PRIMARY DYSTHYMIA EARLY ONSET: ICD-10-CM

## 2024-10-31 DIAGNOSIS — F41.1 GENERALIZED ANXIETY DISORDER: Primary | ICD-10-CM

## 2024-10-31 PROCEDURE — 90837 PSYTX W PT 60 MINUTES: CPT

## 2024-10-31 NOTE — PSYCH
Behavioral Health Psychotherapy Progress Note    Psychotherapy Provided: Individual Psychotherapy     1. Generalized anxiety disorder        2. 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 Primary dysthymia early onset            Goals addressed in session: Goal 1 and Goal 2     DATA:     Lety and therapist spoke about her current progress/stressors. She explained that she has been working on a few things since the last therapy session. She has been able to apply for a couple more jobs. One within her company that's during day shift and another through [facility]. She spoke about how she struggled with the interview at [employer] due to her getting lost to the interview. The instructions on the application with the address weren't clear and she couldn't find the right office. She explained that once she got there, the interviewer insulted her and caused her to feel really negative about it. She apologized, but doesn't think she will get the job. Therapist and Lety spoke about the mistakes that were made and what happened and how she did the best with what she had. She explained that due to prepping, getting ready, and getting there/lost, she was anxious, and after the interview she cried. She stated that even if she was offered the job, she wouldn't take it not only because it pays less than the job she has now, but due to the supervisor being rude to her and snappy. Therapist agreed and spoke about importance of workplace/job satisfaction and how it can highly affect mental health. Lety explained that other than this, she continues to work on her interviewing skills as well as her needing to work on her self-confidence/self-esteem. She and therapist processed some of this and discussed how to be able to be more confident while explaining her work experience. She will also work on updating her resume from her job descriptions in the past. Lastly,  "Lety spoke about how she feels anxious and unsure at times with her family and boyfriend as they don't have aligning political views. She is worried for the election and has anxiety about what the world will be like after the election. Lety and therapist spoke about ways to de-escalate her anxiety as well as self-care activities to do if needed. She agreed. Lety and therapist will continue to discuss the above mentioned and progress in upcoming sessions.     During this session, this clinician used the following therapeutic modalities: Engagement Strategies, Client-centered Therapy, Cognitive Behavioral Therapy, Motivational Interviewing, Solution-Focused Therapy, and Supportive Psychotherapy    Substance Abuse was not addressed during this session. If the client is diagnosed with a co-occurring substance use disorder, please indicate any changes in the frequency or amount of use: N/A. Stage of change for addressing substance use diagnoses: No substance use/Not applicable    ASSESSMENT:  Lety Sanderson presents with a Dysthymic mood.     her affect is Normal range and intensity and Flat, which is congruent, with her mood and the content of the session. The client has made progress on their goals.     Lety Sanderson presents with a none risk of suicide, none risk of self-harm, and none risk of harm to others.    For any risk assessment that surpasses a \"low\" rating, a safety plan must be developed.    A safety plan was indicated: no  If yes, describe in detail     PLAN: Between sessions, Lety Sanderson will continue to work on the above mentioned for next session. At the next session, the therapist will use Engagement Strategies, Client-centered Therapy, Cognitive Behavioral Therapy, Motivational Interviewing, Solution-Focused Therapy, and Supportive Psychotherapy to address her stressors.    Behavioral Health Treatment Plan and Discharge Planning: Lety Sanderson is aware of and agrees to continue to work " on their treatment plan. They have identified and are working toward their discharge goals. yes    Visit start and stop times:    10/31/24  Start Time: 1102  Stop Time: 1200  Total Visit Time: 58 minutes

## 2024-11-14 ENCOUNTER — SOCIAL WORK (OUTPATIENT)
Dept: BEHAVIORAL/MENTAL HEALTH CLINIC | Facility: CLINIC | Age: 24
End: 2024-11-14
Payer: COMMERCIAL

## 2024-11-14 DIAGNOSIS — F41.1 GENERALIZED ANXIETY DISORDER: Primary | ICD-10-CM

## 2024-11-14 DIAGNOSIS — F34.1 PRIMARY DYSTHYMIA EARLY ONSET: ICD-10-CM

## 2024-11-14 DIAGNOSIS — F32.2 CURRENT SEVERE EPISODE OF MAJOR DEPRESSIVE DISORDER WITHOUT PSYCHOTIC FEATURES WITHOUT PRIOR EPISODE (HCC): ICD-10-CM

## 2024-11-14 PROCEDURE — 90837 PSYTX W PT 60 MINUTES: CPT

## 2024-11-14 NOTE — PSYCH
Behavioral Health Psychotherapy Progress Note    Psychotherapy Provided: Individual Psychotherapy     1. Generalized anxiety disorder        2. 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 Primary dysthymia early onset            Goals addressed in session: Goal 1 and Goal 2     DATA:     Lety and therapist spoke about her current progress/stressors. She spoke about how she is still waiting to hear back from her job about the interview she did for a day shift position. Lety explained that she has been hoping to get the position as it's still with the same company and she will have more of a social life. She spoke about how she has been struggling with this due to not being able to spend time with peers, her boyfriend, or family. Lety explained that she doesn't have many activities she does and feels like she can't do much when she has to work. She expressed that she is hoping to get this position not only to grow, but to be able to open up more socially. Lety explained that she has been working on trying to update her resume and add in previous experience. She is having a hard time doing this. Therapist suggested using the companies she worked for's current listings as job responsibilities. She tried that and couldn't find all of them, to which this therapist suggested her to utilize her college's career center to help her through this as well as looking online, utilizing AI, etc. She seemed to like this idea and will try it. Therapist agreed. Lety explained that she has been struggling with finding a place to move. She has money saved away, but isn't sure if she wants to rent/buy. Therapist and Lety spoke about utilizing resources in regards to this such as online sources, social media, etc. She agreed. She has been struggling at home due to her parents and the election as well as her mother having surgery and being home all the  "time. Lety spoke about her looking forward to getting their own place to move to and hopefully being able to feel less stressed there. Lastly, Lety and therapist spoke about issues with her self-confidence/self-esteem as well as symptoms of anxiety. Therapist provided psychoeducation in regards to MARIANELA by utilizing the DSM-V. She seemed to agree that she has this, especially with the being on edge as well as irritable and muscle tension. She explained that she feels like this gets worse the week before her period as well. Therapist and Lety spoke about having a conversation with her psychiatrist when it comes to a possible \"as needed\" medication for her anxiety or something longer term. She agreed. She sees her psychiatrist in the next couple of weeks and will discuss it then. Therapist agreed. Other than this, Lety stated that she struggles around the holidays and isn't looking forward to them. She does have a favorite dish she is looking forward to though. Therapist and Lety spoke about coping skills and supports to utilize if needed. She agreed. Lety and therapist will continue to discuss the above mentioned and progress in upcoming sessions.     During this session, this clinician used the following therapeutic modalities: Engagement Strategies, Client-centered Therapy, Cognitive Behavioral Therapy, Solution-Focused Therapy, and Supportive Psychotherapy    Substance Abuse was not addressed during this session. If the client is diagnosed with a co-occurring substance use disorder, please indicate any changes in the frequency or amount of use: N/A. Stage of change for addressing substance use diagnoses: No substance use/Not applicable    ASSESSMENT:  Lety Sanderson presents with a Dysthymic mood.     her affect is Normal range and intensity and Flat, which is congruent, with her mood and the content of the session. The client has made progress on their goals.     Lety Sanderson presents with a none " "risk of suicide, none risk of self-harm, and none risk of harm to others.    For any risk assessment that surpasses a \"low\" rating, a safety plan must be developed.    A safety plan was indicated: no  If yes, describe in detail     PLAN: Between sessions, Lety Sanderson will continue to work on the above mentioned for next session. At the next session, the therapist will use Engagement Strategies, Client-centered Therapy, Cognitive Behavioral Therapy, Solution-Focused Therapy, and Supportive Psychotherapy to address her stressors.    Behavioral Health Treatment Plan and Discharge Planning: Lety Sanderson is aware of and agrees to continue to work on their treatment plan. They have identified and are working toward their discharge goals. yes    Visit start and stop times:    11/14/24  Start Time: 1100  Stop Time: 1205  Total Visit Time: 65 minutes  "

## 2024-11-26 ENCOUNTER — TELEMEDICINE (OUTPATIENT)
Dept: PSYCHIATRY | Facility: CLINIC | Age: 24
End: 2024-11-26
Payer: COMMERCIAL

## 2024-11-26 DIAGNOSIS — F34.1 PRIMARY DYSTHYMIA EARLY ONSET: ICD-10-CM

## 2024-11-26 DIAGNOSIS — F32.2 CURRENT SEVERE EPISODE OF MAJOR DEPRESSIVE DISORDER WITHOUT PSYCHOTIC FEATURES WITHOUT PRIOR EPISODE (HCC): ICD-10-CM

## 2024-11-26 DIAGNOSIS — F41.1 GENERALIZED ANXIETY DISORDER: Primary | ICD-10-CM

## 2024-11-26 DIAGNOSIS — F32.81 PMDD (PREMENSTRUAL DYSPHORIC DISORDER): ICD-10-CM

## 2024-11-26 PROCEDURE — 99214 OFFICE O/P EST MOD 30 MIN: CPT | Performed by: NURSE PRACTITIONER

## 2024-11-26 RX ORDER — LORAZEPAM 0.5 MG/1
0.5 TABLET ORAL DAILY PRN
Qty: 30 TABLET | Refills: 3 | Status: SHIPPED | OUTPATIENT
Start: 2024-11-26

## 2024-11-26 RX ORDER — BUPROPION HYDROCHLORIDE 150 MG/1
150 TABLET ORAL DAILY
Qty: 90 TABLET | Refills: 1 | Status: SHIPPED | OUTPATIENT
Start: 2024-11-26

## 2024-11-26 RX ORDER — FLUOXETINE HYDROCHLORIDE 60 MG/1
60 TABLET, FILM COATED ORAL; ORAL DAILY
Qty: 90 TABLET | Refills: 1 | Status: SHIPPED | OUTPATIENT
Start: 2024-11-26

## 2024-11-26 NOTE — ASSESSMENT & PLAN NOTE
Orders:    FLUoxetine 60 MG TABS; Take 1 tablet (60 mg total) by mouth daily    buPROPion (WELLBUTRIN XL) 150 mg 24 hr tablet; Take 1 tablet (150 mg total) by mouth daily

## 2024-11-26 NOTE — ASSESSMENT & PLAN NOTE
Orders:    LORazepam (Ativan) 0.5 mg tablet; Take 1 tablet (0.5 mg total) by mouth daily as needed for anxiety

## 2024-11-26 NOTE — PSYCH
Virtual Regular Visit    Verification of patient location:    Patient is located at Home in the following state in which I hold an active license [place]      Assessment/Plan:    Problem List Items Addressed This Visit       Primary dysthymia early onset    Relevant Medications    LORazepam (Ativan) 0.5 mg tablet    FLUoxetine 60 MG TABS    buPROPion (WELLBUTRIN XL) 150 mg 24 hr tablet    Current severe episode of major depressive disorder without psychotic features without prior episode (HCC)    Relevant Medications    LORazepam (Ativan) 0.5 mg tablet    FLUoxetine 60 MG TABS    buPROPion (WELLBUTRIN XL) 150 mg 24 hr tablet    Anxiety disorder - Primary    Relevant Medications    LORazepam (Ativan) 0.5 mg tablet    FLUoxetine 60 MG TABS    buPROPion (WELLBUTRIN XL) 150 mg 24 hr tablet    PMDD (premenstrual dysphoric disorder)    Relevant Medications    LORazepam (Ativan) 0.5 mg tablet    FLUoxetine 60 MG TABS    buPROPion (WELLBUTRIN XL) 150 mg 24 hr tablet         Reason for visit is   Chief Complaint   Patient presents with    Virtual Regular Visit          Encounter provider KETAN Calhoun      Recent Visits  No visits were found meeting these conditions.  Showing recent visits within past 7 days and meeting all other requirements  Today's Visits  Date Type Provider Dept   11/26/24 Telemedicine KETAN Calhoun [facility]   Showing today's visits and meeting all other requirements  Future Appointments  No visits were found meeting these conditions.  Showing future appointments within next 150 days and meeting all other requirements       The patient was identified by name and date of birth. Lety JANES Mirzasona was informed that this is a telemedicine visit and that the visit is being conducted throughthe Epic Embedded platform. She agrees to proceed..  My office door was closed. No one else was in the room.  She acknowledged consent and understanding of privacy and security of the video platform.  The patient has agreed to participate and understands they can discontinue the visit at any time.    Patient is aware this is a billable service.     MEDICATION MANAGEMENT NOTE        [facility]    Name and Date of Birth:  Lety Sanderson 24 y.o. 2000 MRN: [id number]    Date of Visit: November 26, 2024    Reason for Visit: Psychiatric medication management follow-up visit    Allergies   Allergen Reactions    Other      Other reaction(s): Asthma    Pollen Extract Allergic Rhinitis and Wheezing         Assessment & Plan  Generalized anxiety disorder    Orders:    LORazepam (Ativan) 0.5 mg tablet; Take 1 tablet (0.5 mg total) by mouth daily as needed for anxiety    Current severe episode of major depressive disorder without psychotic features without prior episode (HCC)    Orders:    FLUoxetine 60 MG TABS; Take 1 tablet (60 mg total) by mouth daily    buPROPion (WELLBUTRIN XL) 150 mg 24 hr tablet; Take 1 tablet (150 mg total) by mouth daily    PMDD (premenstrual dysphoric disorder)         Primary dysthymia early onset                SUBJECTIVE:    Lety is seen today for a follow up for Major Depressive Disorder and Generalized Anxiety Disorder. She continues to experience ongoing symptoms since the last visit.  She continues to report depressed mood and minimal improvement.  She reports some increase in anxiety especially the week before her period.  She notes feeling greatly stressed and having some physical symptoms of neck feeling warm and tingling.  Will be more irritable during this window.  She is frustrated with work and tried to get on day shift but was unable to.  She is still doing well with her boyfriend.  She is living at home with mom and seems to be adjusting okay, but long-term goal is to get out on her own.  We discussed medication options.  I also reviewed with her Spravato as a potential treatment.  She will research this and continue to  consider it.  She is agreeable to as needed Ativan for the occasional anxiety in the week before her period when she feels completely overwhelmed and distraught.  She is no history of substance abuse.  She does not wish to start a medication that she take every day since her symptoms are so infrequent.  She continues to follow-up in therapy with Isabelle and does feel this has been helpful.      She denies any side effects from current psychiatric medications.      HPI ROS Appetite Changes and Sleep:     She reports fluctuating sleep pattern, fluctuating appetite, fluctuating energy levels. Denies homicidal ideation, denies suicidal ideation    Review Of Systems:   no complaints, all other systems are negative , except as noted above         Mental Status Evaluation:    Appearance:  age appropriate   Behavior:  pleasant, cooperative   Speech:  slow   Mood:  depressed   Affect:  flat   Thought Process:  goal directed   Associations: concrete associations   Thought Content:  no overt delusions   Perceptual Disturbances: none   Risk Potential: Suicidal ideation - None  Homicidal ideation - None  Potential for aggression - No   Sensorium:  oriented to person, place, and time/date   Memory:  recent and remote memory grossly intact   Consciousness:  alert and awake   Attention: decreased concentration and decreased attention span   Insight:  fair   Judgment: fair   Gait/Station: normal gait/station, normal balance   Motor Activity: no abnormal movements       History Review:The following portions of the patient's history were reviewed and updated as appropriate: psychiatric history, trauma history allergies, current medications, past family history, past medical history, past social history, past surgical history, and problem list   OBJECTIVE:     Vital signs in last 24 hours:    There were no vitals filed for this visit.  Laboratory Results: I have personally reviewed all pertinent laboratory/tests results.      Treatment  Recommendations/Precautions:    Start Ativan 0.5 mg p.o. twice daily as needed for anxiety  Continue Prozac 60 mg daily for depression and anxiety and PMDD  Continue Wellbutrin  mg daily for depression.  Aware of need to follow up with family physician for medical issues  Aware of 24 hour and weekend coverage for urgent situations accessed by calling [facility] main practice number  Medication management every 3 months  Continue psychotherapy with own therapist  Patient advised to call 911 if feeling suicidal or homicidal before acting out on their thoughts and they expressed understanding.    Medications Risks/Benefits      Risks, Benefits And Possible Side Effects Of Medications:    Discussed risks and benefits of treatment with patient including risk of suicidality, serotonin syndrome, increased QTc interval and SIADH related to treatment with antidepressants; Risk of induction of manic symptoms in certain patient populations and risks of misuse, abuse or dependence, sedation and respiratory depression related to treatment with benzodiazepine medications         Controlled Medication Discussion:     No recent records found for controlled prescriptions according to [place] Prescription Drug Monitoring Program  Not applicable - controlled prescriptions are not prescribed by this practice    Psychotherapy Provided:     Individual psychotherapy provided: Medications, treatment progress and treatment plan reviewed with Lety.  Medication changes discussed with Lety.  Medication education provided to Lety.  Reassurance and supportive therapy provided.      Treatment Plan:    Completed and signed during the session: Not applicable - Treatment Plan not due at this session      Total Visit Duration:  30 minutes     The total visit duration detailed above includes: patient engagement, medication management, psychotherapy/counseling, discussion regarding treatment goals, and  coordination of care.      Note Share Disclaimer:     This note was not shared with the patient due to this is a psychotherapy note    KETAN Calhoun 11/26/24    This note was completed in part utilizing Dragon dictation Software. Grammatical, translation, syntax errors, random word insertions, spelling mistakes, and incomplete sentences may be an occasional consequence of this system secondary to software limitations with voice recognition, ambient noise, and hardware issues. If you have any questions or concerns about the content, text, or information contained within the body of this dictation, please contact the provider for clarification.

## 2024-12-12 ENCOUNTER — SOCIAL WORK (OUTPATIENT)
Dept: BEHAVIORAL/MENTAL HEALTH CLINIC | Facility: CLINIC | Age: 24
End: 2024-12-12
Payer: COMMERCIAL

## 2024-12-12 DIAGNOSIS — F41.1 GENERALIZED ANXIETY DISORDER: Primary | ICD-10-CM

## 2024-12-12 DIAGNOSIS — F34.1 PRIMARY DYSTHYMIA EARLY ONSET: ICD-10-CM

## 2024-12-12 DIAGNOSIS — F32.2 CURRENT SEVERE EPISODE OF MAJOR DEPRESSIVE DISORDER WITHOUT PSYCHOTIC FEATURES WITHOUT PRIOR EPISODE (HCC): ICD-10-CM

## 2024-12-12 PROCEDURE — 90837 PSYTX W PT 60 MINUTES: CPT

## 2024-12-12 NOTE — PSYCH
"Behavioral Health Psychotherapy Progress Note    Psychotherapy Provided: Individual Psychotherapy     1. Generalized anxiety disorder        2. 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 Primary dysthymia early onset            Goals addressed in session: Goal 1 and Goal 2     DATA:     Lety and therapist spoke about her current progress/stressors. She spoke about how she has continued to wait to hear about the job and if she got it still. Her job is still interviewing people for the position. Lety explained that she hasn't been able to find many jobs within her career that are open. She spoke about how she has been feeling very depressed and down on herself. She exhibits poor self-esteem/self-confidence in herself as well as her future. She makes passive SI comments stating that she'd rather die and that she dreams of being hit by a bus, but wouldn't act on it. She states that she has had these thoughts since childhood and states that they are her \"baseline\". To combat her depression and anxiety, she recently had a medication change. She states that she is scared and has yet to try it due to this and how her family doesn't \"believe\" in medications to help her mental health. Her provider prescribed her Ketamine and she wants to be able to take it when she's home and knows how it can affect her. Therapist agreed and explained that of she has any questions or concerns to contact the office. She agreed. Lety also spoke about how she feels like she isn't smart or successful. She explained that she isn't sure what she wants in life and is unsure of what she wants to do career wise. She spoke about the things she does want, but doesn't know how to get to them. Therapist and Lety spoke about ways to work on this an envision her future such as a \"vision board\" or saving pictures/links to things that she wants/needs as well as things that would " "possibly describe her when she is doing well. She agreed. Lety and therapist spoke about \"soul searching\" and being able to dig deep and look what could be holding her back. She explained that she wants to continue to work on this in upcoming sessions. Therapist agreed. Lety and therapist will continue to discuss the above mentioned and progress in upcoming sessions.    During this session, this clinician used the following therapeutic modalities: Engagement Strategies, Client-centered Therapy, Cognitive Behavioral Therapy, Solution-Focused Therapy, and Supportive Psychotherapy    Substance Abuse was not addressed during this session. If the client is diagnosed with a co-occurring substance use disorder, please indicate any changes in the frequency or amount of use: N/A. Stage of change for addressing substance use diagnoses: No substance use/Not applicable    ASSESSMENT:  Lety Sanderson presents with a Dysthymic mood.     her affect is Normal range and intensity and Flat, which is congruent, with her mood and the content of the session. The client has made progress on their goals.     Lety Sanderson presents with a none risk of suicide, none risk of self-harm, and none risk of harm to others.    For any risk assessment that surpasses a \"low\" rating, a safety plan must be developed.    A safety plan was indicated: no  If yes, describe in detail     PLAN: Between sessions, Lety Sanderson will continue to work on the above mentioned for next session. At the next session, the therapist will use Engagement Strategies, Client-centered Therapy, Cognitive Behavioral Therapy, Solution-Focused Therapy, and Supportive Psychotherapy to address her stressors.    Behavioral Health Treatment Plan and Discharge Planning: Lety Sanderson is aware of and agrees to continue to work on their treatment plan. They have identified and are working toward their discharge goals. yes    Depression Follow-up Plan Completed: No    Visit " start and stop times:    12/12/24  Start Time: 1100  Stop Time: 1204  Total Visit Time: 64 minutes

## 2024-12-26 ENCOUNTER — OFFICE VISIT (OUTPATIENT)
Dept: URGENT CARE | Age: 24
End: 2024-12-26
Payer: COMMERCIAL

## 2024-12-26 VITALS
RESPIRATION RATE: 18 BRPM | TEMPERATURE: 97.5 F | HEIGHT: 62 IN | HEART RATE: 83 BPM | BODY MASS INDEX: 23 KG/M2 | WEIGHT: 125 LBS | OXYGEN SATURATION: 100 %

## 2024-12-26 DIAGNOSIS — J02.9 SORE THROAT: Primary | ICD-10-CM

## 2024-12-26 DIAGNOSIS — R11.0 NAUSEA: ICD-10-CM

## 2024-12-26 LAB — S PYO AG THROAT QL: NEGATIVE

## 2024-12-26 PROCEDURE — 87070 CULTURE OTHR SPECIMN AEROBIC: CPT

## 2024-12-26 PROCEDURE — 99213 OFFICE O/P EST LOW 20 MIN: CPT

## 2024-12-26 PROCEDURE — 87880 STREP A ASSAY W/OPTIC: CPT

## 2024-12-26 RX ORDER — ONDANSETRON 4 MG/1
4 TABLET, FILM COATED ORAL EVERY 8 HOURS PRN
Qty: 20 TABLET | Refills: 0 | Status: SHIPPED | OUTPATIENT
Start: 2024-12-26

## 2024-12-26 RX ORDER — VITAMIN B COMPLEX
1000 TABLET ORAL DAILY
COMMUNITY

## 2024-12-26 NOTE — LETTER
December 26, 2024     Patient: Lety Sanderson   YOB: 2000   Date of Visit: 12/26/2024       To Whom it May Concern:    Lety Sanderson was seen in my clinic on 12/26/2024. She may return on 12/27/2024.     If you have any questions or concerns, please don't hesitate to call.         Sincerely,          KETAN Pearl        CC: No Recipients

## 2024-12-26 NOTE — PROGRESS NOTES
Assessment/Plan  Rapid strep performed in office found to be negative, throat culture results pending and should be available in [facility] within 48 hours.  COVID/flu cultures testing offered, declined at this time, advised that this would not alter management.   Please begin Zofran as needed for nausea.   Ensure good hydration.   May alternate Tylenol/ibuprofen as needed for fever.  May use Cepacol lozenges, Chloraseptic throat spray, warm salt water gargles and hot tea with honey as needed for sore throat.  Follow up with primary care provider if symptoms do not resolve within 1-2 weeks.      Sore throat [J02.9]  1. Sore throat  POCT rapid ANTIGEN strepA    Throat culture    Throat culture      2. Nausea  ondansetron (ZOFRAN) 4 mg tablet    CANCELED: COVID/FLU      Patient Education     Sore Throat, Adult ED   General Information   You came to the Emergency Department (ED) for a sore throat. Your sore throat is likely caused by a virus. Most of the time, a sore throat will go away without antibiotics in a week or two.  You may be waiting for some test results. The staff will contact you if there are concerning results. If you have strep throat, which is caused by bacteria, you will need to take an antibiotic.  What care is needed at home?   Call your regular doctor to let them know you were in the ED. Make a follow-up appointment if you were told to.  To help ease a sore throat you can:  Use a sore throat spray.  Suck on hard candy or throat lozenges.  Gargle with warm saltwater a few times each day.  You may want to take medicine like acetaminophen, ibuprofen, or naproxen for pain.  If you decide to take over-the-counter cough or cold medicines, follow the directions on the label carefully. Be sure you do not take more than one medicine that contains acetaminophen. Also, if you have a heart problem or high blood pressure, check with your doctor before you take any of these medicines.  Wash your hands often. This  will help keep others healthy.  When do I need to get emergency help?   Call for an ambulance right away if:   You have trouble breathing.  Your neck, tongue, or throat is swelling.  You are drooling because you cannot swallow your saliva.  When do I need to call the doctor?   You have very bad pain in your throat that keeps you from eating or drinking anything.  There are large, painful lumps in your neck.  You have blisters in the back of your throat.  You have new or worsening symptoms.  Last Reviewed Date   2020-10-23  Consumer Information Use and Disclaimer   This generalized information is a limited summary of diagnosis, treatment, and/or medication information. It is not meant to be comprehensive and should be used as a tool to help the user understand and/or assess potential diagnostic and treatment options. It does NOT include all information about conditions, treatments, medications, side effects, or risks that may apply to a specific patient. It is not intended to be medical advice or a substitute for the medical advice, diagnosis, or treatment of a health care provider based on the health care provider's examination and assessment of a patient’s specific and unique circumstances. Patients must speak with a health care provider for complete information about their health, medical questions, and treatment options, including any risks or benefits regarding use of medications. This information does not endorse any treatments or medications as safe, effective, or approved for treating a specific patient. [employer] and its affiliates disclaim any warranty or liability relating to this information or the use thereof. The use of this information is governed by the Terms of Use, available at https://www.wolterskluwer.com/en/know/clinical-effectiveness-terms   Copyright   Copyright © 2024 [employer] and its affiliates and/or licensors. All rights reserved.         Subjective:     Patient ID: Lety JANES  Kin is a 24 y.o. female.      Reason For Visit / Chief Complaint  Chief Complaint   Patient presents with    Sore Throat     X 5 days    Vomiting         Sore Throat   This is a new problem. The current episode started in the past 7 days (x 5 days). The problem has been unchanged. Neither side of throat is experiencing more pain than the other. There has been no fever. Associated symptoms include vomiting. Pertinent negatives include no abdominal pain, congestion, coughing, diarrhea, drooling, ear discharge, ear pain, headaches, hoarse voice, plugged ear sensation, neck pain, shortness of breath, stridor, swollen glands or trouble swallowing. She has had no exposure to strep or mono. She has tried nothing for the symptoms. The treatment provided no relief.   Vomiting   Associated symptoms include myalgias. Pertinent negatives include no abdominal pain, arthralgias, chest pain, coughing, diarrhea, dizziness, fever or headaches.           Past Medical History:   Diagnosis Date    Allergic     Asthma exacerbation, mild     last assessed - 06Oct2016    Chronic tonsillitis     Depression     Eczema     Fracture of phalanx of right index finger     Menorrhagia     last assessed - 05Nov2014    Mild asthma with acute exacerbation 02/08/2019    Obstructive sleep apnea of child     Orthostatic dizziness     last assessed - 20Nov2015    Premenarchal     Seasonal allergies     Resolved - [id number]    Tinea corporis 05/15/2020       Past Surgical History:   Procedure Laterality Date    TONSILLECTOMY AND ADENOIDECTOMY         Family History   Adopted: Yes   Family history unknown: Yes       Review of Systems   Constitutional:  Positive for fatigue. Negative for fever.   HENT:  Positive for sore throat. Negative for congestion, drooling, ear discharge, ear pain, hoarse voice, postnasal drip, rhinorrhea, sinus pressure, sinus pain, sneezing and trouble swallowing.    Eyes: Negative.  Negative for pain, discharge, redness and  "itching.   Respiratory: Negative.  Negative for apnea, cough, choking, chest tightness, shortness of breath, wheezing and stridor.    Cardiovascular: Negative.  Negative for chest pain and palpitations.   Gastrointestinal:  Positive for nausea and vomiting. Negative for abdominal pain and diarrhea.        X 2 episodes of vomiting in 3 days   Endocrine: Negative.  Negative for polydipsia, polyphagia and polyuria.   Genitourinary: Negative.  Negative for decreased urine volume and flank pain.   Musculoskeletal:  Positive for myalgias. Negative for arthralgias, back pain, gait problem, joint swelling, neck pain and neck stiffness.   Skin: Negative.  Negative for color change and rash.   Allergic/Immunologic: Negative.  Negative for environmental allergies.   Neurological: Negative.  Negative for dizziness, facial asymmetry, light-headedness, numbness and headaches.   Hematological: Negative.  Negative for adenopathy.   Psychiatric/Behavioral: Negative.         Objective:    Pulse 83   Temp 97.5 °F (36.4 °C)   Resp 18   Ht 5' 2\" (1.575 m)   Wt 56.7 kg (125 lb)   LMP 12/18/2024   SpO2 100%   BMI 22.86 kg/m²     Physical Exam  Vitals and nursing note reviewed.   Constitutional:       General: She is not in acute distress.     Appearance: Normal appearance. She is not ill-appearing, toxic-appearing or diaphoretic.      Interventions: She is not intubated.  HENT:      Head: Normocephalic and atraumatic.      Right Ear: Hearing, tympanic membrane, ear canal and external ear normal. Tympanic membrane is not erythematous or bulging.      Left Ear: Hearing, tympanic membrane, ear canal and external ear normal. Tympanic membrane is not erythematous or bulging.      Nose: Nose normal. No congestion or rhinorrhea.      Mouth/Throat:      Mouth: Mucous membranes are moist. No oral lesions.      Pharynx: Uvula midline. No pharyngeal swelling, oropharyngeal exudate, posterior oropharyngeal erythema, uvula swelling or postnasal " drip.      Tonsils: No tonsillar exudate or tonsillar abscesses. 1+ on the right. 1+ on the left.   Eyes:      Extraocular Movements: Extraocular movements intact.      Conjunctiva/sclera: Conjunctivae normal.      Pupils: Pupils are equal, round, and reactive to light.   Neck:      Thyroid: No thyroid mass, thyromegaly or thyroid tenderness.   Cardiovascular:      Rate and Rhythm: Normal rate and regular rhythm.      Pulses: Normal pulses.      Heart sounds: Normal heart sounds, S1 normal and S2 normal. Heart sounds not distant. No murmur heard.     No friction rub. No gallop.   Pulmonary:      Effort: Pulmonary effort is normal. No tachypnea, bradypnea, accessory muscle usage, prolonged expiration, respiratory distress or retractions. She is not intubated.      Breath sounds: Normal breath sounds. No stridor, decreased air movement or transmitted upper airway sounds. No decreased breath sounds, wheezing, rhonchi or rales.   Abdominal:      General: Abdomen is flat. Bowel sounds are normal.      Palpations: Abdomen is soft.      Tenderness: There is no abdominal tenderness. There is no right CVA tenderness, left CVA tenderness, guarding or rebound.   Musculoskeletal:         General: Normal range of motion.      Cervical back: Full passive range of motion without pain, normal range of motion and neck supple. No tenderness. No spinous process tenderness or muscular tenderness. Normal range of motion.   Lymphadenopathy:      Cervical: No cervical adenopathy.      Right cervical: No superficial cervical adenopathy.     Left cervical: No superficial cervical adenopathy.   Skin:     General: Skin is warm and dry.      Capillary Refill: Capillary refill takes less than 2 seconds.   Neurological:      General: No focal deficit present.      Mental Status: She is alert and oriented to person, place, and time.      Cranial Nerves: No cranial nerve deficit.   Psychiatric:         Mood and Affect: Mood normal.          Behavior: Behavior normal.

## 2024-12-26 NOTE — PATIENT INSTRUCTIONS
Rapid strep performed in office found to be negative, throat culture results pending and should be available in [facility] within 48 hours.  COVID/flu cultures testing offered, declined at this time, advised that this would not alter management.   Please begin Zofran as needed for nausea.   Ensure good hydration.   May alternate Tylenol/ibuprofen as needed for fever.  May use Cepacol lozenges, Chloraseptic throat spray, warm salt water gargles and hot tea with honey as needed for sore throat.  Follow up with primary care provider if symptoms do not resolve within 1-2 weeks.

## 2024-12-28 LAB — BACTERIA THROAT CULT: NORMAL

## 2025-01-09 ENCOUNTER — SOCIAL WORK (OUTPATIENT)
Dept: BEHAVIORAL/MENTAL HEALTH CLINIC | Facility: CLINIC | Age: 25
End: 2025-01-09
Payer: COMMERCIAL

## 2025-01-09 DIAGNOSIS — F41.1 GENERALIZED ANXIETY DISORDER: Primary | ICD-10-CM

## 2025-01-09 DIAGNOSIS — F32.2 CURRENT SEVERE EPISODE OF MAJOR DEPRESSIVE DISORDER WITHOUT PSYCHOTIC FEATURES WITHOUT PRIOR EPISODE (HCC): ICD-10-CM

## 2025-01-09 DIAGNOSIS — F34.1 PRIMARY DYSTHYMIA EARLY ONSET: ICD-10-CM

## 2025-01-09 PROCEDURE — 90837 PSYTX W PT 60 MINUTES: CPT

## 2025-01-09 NOTE — PSYCH
Behavioral Health Psychotherapy Progress Note    Psychotherapy Provided: Individual Psychotherapy     1. Generalized anxiety disorder        2. 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 Primary dysthymia early onset            Goals addressed in session: Goal 1 and Goal 2     DATA:     Lety and therapist spoke about her current progress/stressors. She explained that she has been having some interpersonal issues with her mother. She explained that her mother is  and Lety is her only child. Lety explained that recently her mother confronted her about her not spending time or talking to her. She stated that she has been spending time with her boyfriend and his family more recently due to his 2 sisters getting  and one is having a baby. Lety expressed that she works overnights and her mother works late at night to early in the morning, so they don't have much time together. Lety's mother also has some physical limitations that also cause her to not be able to get around as well. She explained that she picks up the household chores due to this. Therapist and Lety spoke in depth about her mother and wanting to try to find a compromise for her to work out with her. Therapist suggested having breakfast with her as this is the meal that they are both home for. She and therapist also discussed tv shows they watch in common as well as other things they could pineda on. Lety seemed to take well to this. Lety stated that she feels overwhelmed at times due to being the only child and everything being on her, including her mother relying on her and needing her emotionally. She stated that her mother isn't dating anyone and she wishes her mother had a distraction at times. Therapist and Lety worked together to come up with ways to effectively communicate her needs and boundaries in ways her mother won't react defensively.  "Therapist also offered Lety to bring her mother in for a family session if need be. She agreed and will let therapist know if she needs to. Lety also spoke about how she and her boyfriend continue to save for a home. She expressed her frustration with the market and how she is hoping to be able to buy a home with him in the near future. Lety and therapist spoke about making a list of things she wants in a home as well as what she doesn't want. Therapist suggested Lety and her boyfriend to go to open houses to see homes and get an idea in person. She seemed to like this idea. Therapist even suggested bringing her mother if she wants an extra pair of eyes or an opinion. She stated that she would think about it, but has more of a better idea of where to start. Therapist agreed. Other than this, Lety reports that she had a really nice holiday season and is just working and saving money for the future house. Lety and therapist will continue to discuss the above mentioned and progress in upcoming sessions.     During this session, this clinician used the following therapeutic modalities: Engagement Strategies, Client-centered Therapy, Cognitive Behavioral Therapy, Solution-Focused Therapy, and Supportive Psychotherapy    Substance Abuse was not addressed during this session. If the client is diagnosed with a co-occurring substance use disorder, please indicate any changes in the frequency or amount of use: N/A. Stage of change for addressing substance use diagnoses: No substance use/Not applicable    ASSESSMENT:  Lety Sanderson presents with a Dysthymic mood.     her affect is Normal range and intensity and Flat, which is congruent, with her mood and the content of the session. The client has made progress on their goals.     Lety Sanderson presents with a none risk of suicide, none risk of self-harm, and none risk of harm to others.    For any risk assessment that surpasses a \"low\" rating, a safety plan " must be developed.    A safety plan was indicated: no  If yes, describe in detail     PLAN: Between sessions, Lety Sanderson will continue to work on the above mentioned for next session. At the next session, the therapist will use Engagement Strategies, Client-centered Therapy, Cognitive Behavioral Therapy, Solution-Focused Therapy, and Supportive Psychotherapy to address her stressors.    Behavioral Health Treatment Plan and Discharge Planning: Lety Sanderson is aware of and agrees to continue to work on their treatment plan. They have identified and are working toward their discharge goals. yes    Depression Follow-up Plan Completed: No    Visit start and stop times:    01/09/25  Start Time: 1100  Stop Time: 1202  Total Visit Time: 62 minutes

## 2025-01-19 DIAGNOSIS — Z30.41 ENCOUNTER FOR SURVEILLANCE OF CONTRACEPTIVE PILLS: ICD-10-CM

## 2025-01-20 RX ORDER — NORGESTIMATE AND ETHINYL ESTRADIOL 7DAYSX3 LO
1 KIT ORAL DAILY
Qty: 28 TABLET | Refills: 1 | Status: SHIPPED | OUTPATIENT
Start: 2025-01-20 | End: 2025-01-22 | Stop reason: SDUPTHER

## 2025-01-22 DIAGNOSIS — Z30.41 ENCOUNTER FOR SURVEILLANCE OF CONTRACEPTIVE PILLS: ICD-10-CM

## 2025-01-22 RX ORDER — NORGESTIMATE AND ETHINYL ESTRADIOL 7DAYSX3 LO
1 KIT ORAL DAILY
Qty: 28 TABLET | Refills: 1 | Status: SHIPPED | OUTPATIENT
Start: 2025-01-22

## 2025-01-28 ENCOUNTER — TELEMEDICINE (OUTPATIENT)
Dept: PSYCHIATRY | Facility: CLINIC | Age: 25
End: 2025-01-28
Payer: COMMERCIAL

## 2025-01-28 DIAGNOSIS — F32.81 PMDD (PREMENSTRUAL DYSPHORIC DISORDER): ICD-10-CM

## 2025-01-28 DIAGNOSIS — F32.2 CURRENT SEVERE EPISODE OF MAJOR DEPRESSIVE DISORDER WITHOUT PSYCHOTIC FEATURES WITHOUT PRIOR EPISODE (HCC): Primary | ICD-10-CM

## 2025-01-28 DIAGNOSIS — F41.1 GENERALIZED ANXIETY DISORDER: ICD-10-CM

## 2025-01-28 PROCEDURE — 99214 OFFICE O/P EST MOD 30 MIN: CPT | Performed by: NURSE PRACTITIONER

## 2025-01-28 NOTE — PSYCH
Virtual Regular Visit    Verification of patient location:    Patient is located at Home in the following state in which I hold an active license [place]      Assessment/Plan:    Problem List Items Addressed This Visit       Current severe episode of major depressive disorder without psychotic features without prior episode (HCC) - Primary    Anxiety disorder    PMDD (premenstrual dysphoric disorder)           Depression Follow-up Plan Completed: Yes    Reason for visit is   Chief Complaint   Patient presents with    Virtual Regular Visit          Encounter provider KETAN Calhoun      Recent Visits  No visits were found meeting these conditions.  Showing recent visits within past 7 days and meeting all other requirements  Today's Visits  Date Type Provider Dept   01/28/25 Telemedicine KETAN Calhoun [facility] [address]   Showing today's visits and meeting all other requirements  Future Appointments  No visits were found meeting these conditions.  Showing future appointments within next 150 days and meeting all other requirements       The patient was identified by name and date of birth. Lety Sanderson was informed that this is a telemedicine visit and that the visit is being conducted throughthe Epic Embedded platform. She agrees to proceed..  My office door was closed. No one else was in the room.  She acknowledged consent and understanding of privacy and security of the video platform. The patient has agreed to participate and understands they can discontinue the visit at any time.    Patient is aware this is a billable service.     MEDICATION MANAGEMENT NOTE        [facility]    Name and Date of Birth:  Lety Sanderson 24 y.o. 2000 MRN: [id number]    Date of Visit: January 28, 2025    Reason for Visit: Psychiatric medication management follow-up visit    Allergies   Allergen Reactions    Other      Other reaction(s): Asthma    Pollen Extract  "Allergic Rhinitis and Wheezing         Assessment & Plan  PMDD (premenstrual dysphoric disorder)         Current severe episode of major depressive disorder without psychotic features without prior episode (HCC)         Generalized anxiety disorder                SUBJECTIVE:    Lety is seen today for a follow up for Major Depressive Disorder and Generalized Anxiety Disorder. She continues to experience on and off depressive symptoms since the last visit.  At last visit I did add as needed Ativan for physical symptoms of anxiety.  She only tried it once, but found it to be too tiring.  She is not comfortable with medications to begin with, and so she has not tried it since.  I did tell her was okay to take 1/2 tablet if needed.  She states her anxiety has been a little better since that time anyway.  Mood continues to be overall depressed but some improvement this month.  Today she rates her depression a 5 or 6 out of 10, 10 being worst.  This is an improvement for her.  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  She presents today a little brighter and little more forward thinking, slightly less negative.  She does feel therapy with holiday is helping and states \"this is the best therapist I have ever had.\"  I think this is the source of the improvements.    She denies any side effects from current psychiatric medications.      HPI ROS Appetite Changes and Sleep:     She reports fluctuating sleep pattern, fluctuating appetite, fluctuating energy levels. Denies homicidal ideation, denies suicidal ideation    Review Of Systems:   no complaints , except as noted above         Mental Status Evaluation:    Appearance:  age appropriate   Behavior:  pleasant, cooperative   Speech:  normal rate, normal volume   Mood:  improved   Affect:  brighter   Thought Process:  goal directed   Associations: intact associations   Thought Content:  no overt delusions "   Perceptual Disturbances: none   Risk Potential: Suicidal ideation - None at present  Homicidal ideation - None  Potential for aggression - No   Sensorium:  oriented to person, place, and time/date   Memory:  recent and remote memory grossly intact   Consciousness:  alert and awake   Attention: decreased concentration and decreased attention span   Insight:  limited   Judgment: limited   Gait/Station: normal gait/station, normal balance   Motor Activity: no abnormal movements       History Review:The following portions of the patient's history were reviewed and updated as appropriate: psychiatric history, trauma history allergies, current medications, past family history, past medical history, past social history, past surgical history, and problem list   OBJECTIVE:     Vital signs in last 24 hours:    There were no vitals filed for this visit.  Laboratory Results: I have personally reviewed all pertinent laboratory/tests results.      Treatment Recommendations/Precautions:    Continue Wellbutrin  mg daily.  Previous dose increased because some increase in nausea  Continue Prozac 60 mg daily for depression and also helping with her PMDD  Continue as needed Ativan, she has been reluctant to take this  She continues individual therapy with Isabelle.  Aware of need to follow up with family physician for medical issues  Aware of 24 hour and weekend coverage for urgent situations accessed by calling [facility] main practice number  Medication management every 2 months  Patient advised to call 911 if feeling suicidal or homicidal before acting out on their thoughts and they expressed understanding.    Medications Risks/Benefits      Risks, Benefits And Possible Side Effects Of Medications:    Discussed risks and benefits of treatment with patient including risk of suicidality, serotonin syndrome, increased QTc interval and SIADH related to treatment with antidepressants; Risk of induction of  manic symptoms in certain patient populations         Controlled Medication Discussion:     Lety has been filling controlled prescriptions on time as prescribed according to [place] Prescription Drug Monitoring Program    Psychotherapy Provided:     Individual psychotherapy provided: Medications, treatment progress and treatment plan reviewed with Lety.  Medication changes discussed with Lety.  Medication education provided to Lety.  Reassurance and supportive therapy provided.      Treatment Plan:    Completed and signed during the session: Not applicable - Treatment Plan not due at this session      Total Visit Duration:  30 minutes     The total visit duration detailed above includes: patient engagement, medication management, psychotherapy/counseling, discussion regarding treatment goals, and coordination of care.      Note Share Disclaimer:     This note was not shared with the patient due to this is a psychotherapy note    KETAN Calhoun 01/28/25    This note was completed in part utilizing Dragon dictation Software. Grammatical, translation, syntax errors, random word insertions, spelling mistakes, and incomplete sentences may be an occasional consequence of this system secondary to software limitations with voice recognition, ambient noise, and hardware issues. If you have any questions or concerns about the content, text, or information contained within the body of this dictation, please contact the provider for clarification.

## 2025-01-30 ENCOUNTER — OFFICE VISIT (OUTPATIENT)
Dept: INTERNAL MEDICINE CLINIC | Facility: CLINIC | Age: 25
End: 2025-01-30
Payer: COMMERCIAL

## 2025-01-30 VITALS
OXYGEN SATURATION: 98 % | DIASTOLIC BLOOD PRESSURE: 68 MMHG | BODY MASS INDEX: 23.96 KG/M2 | HEART RATE: 97 BPM | SYSTOLIC BLOOD PRESSURE: 124 MMHG | WEIGHT: 130.2 LBS | HEIGHT: 62 IN

## 2025-01-30 DIAGNOSIS — J02.9 SORE THROAT: ICD-10-CM

## 2025-01-30 DIAGNOSIS — J06.9 ACUTE URI: Primary | ICD-10-CM

## 2025-01-30 LAB
S PYO AG THROAT QL: NEGATIVE
SARS-COV-2 AG UPPER RESP QL IA: NEGATIVE
SL AMB POCT RAPID FLU A: NORMAL
SL AMB POCT RAPID FLU B: NORMAL
VALID CONTROL: NORMAL

## 2025-01-30 PROCEDURE — 87880 STREP A ASSAY W/OPTIC: CPT | Performed by: INTERNAL MEDICINE

## 2025-01-30 PROCEDURE — 87811 SARS-COV-2 COVID19 W/OPTIC: CPT | Performed by: INTERNAL MEDICINE

## 2025-01-30 PROCEDURE — 99213 OFFICE O/P EST LOW 20 MIN: CPT | Performed by: INTERNAL MEDICINE

## 2025-01-30 PROCEDURE — 87804 INFLUENZA ASSAY W/OPTIC: CPT | Performed by: INTERNAL MEDICINE

## 2025-01-30 NOTE — LETTER
January 30, 2025     Patient: Lety Sanderson  YOB: 2000  Date of Visit: 1/30/2025      To Whom it May Concern:    Lety Sanderson is under my professional care. Lety was seen in my office on 1/30/2025. Lety may return to work on 2/3/25 .    If you have any questions or concerns, please don't hesitate to call.         Sincerely,          Keny Reese MD

## 2025-01-30 NOTE — PROGRESS NOTES
"Name: Lety Sanderson      : 2000      MRN: [id number]  Encounter Provider: Keny Reese MD  Encounter Date: 2025   Encounter department: [facility]  :  Assessment & Plan  Acute URI  -Rapid COVID/strep/flu negative  -Take Mucinex/Robitussin DM for cough and congestion  -Administer a saline nasal spray as needed for sinus congestion  -Take Tylenol or ibuprofen as needed for pain and/or fever  -Perform daily salt water gargles for sore throat    Orders:  •  POCT rapid flu A and B  •  POCT Rapid Covid Ag    Sore throat    Orders:  •  POCT rapid ANTIGEN strepA           History of Present Illness   HPI  Patient presents today as an acute visit.  She complains of severe sore throat, postnasal drip, body aches, mild cough and sinus pressure.  She reports her symptoms have been present over the past 3 to 4 days.  She has not taken any over-the-counter medication for symptom management.    Review of Systems  All other systems negative except for pertinent findings noted in HPI.       Objective   /68 (BP Location: Left arm, Patient Position: Sitting, Cuff Size: Standard)   Pulse 97   Ht 5' 2\" (1.575 m)   Wt 59.1 kg (130 lb 3.2 oz)   SpO2 98%   BMI 23.81 kg/m²      Physical Exam  General: NAD  HEENT: NCAT, EOMI, normal conjunctiva, mild posterior pharyngeal erythema  Cardiovascular: RRR, normal S1 and S2, no m/r/g  Pulmonary: Normal respiratory effort, no wheezes, rales or rhonchi  Extremities: No lower extremity edema  Skin: Normal skin color, no rashes   Psychiatric: Normal mood and affect      "

## 2025-01-30 NOTE — PATIENT INSTRUCTIONS
-Take Mucinex/Robitussin DM for cough and congestion  -Administer a saline nasal spray as needed for sinus congestion  -Take Tylenol or ibuprofen as needed for pain and/or fever  -Perform daily salt water gargles for sore throat

## 2025-02-17 ENCOUNTER — TELEPHONE (OUTPATIENT)
Dept: BEHAVIORAL/MENTAL HEALTH CLINIC | Facility: CLINIC | Age: 25
End: 2025-02-17

## 2025-02-17 ENCOUNTER — TELEPHONE (OUTPATIENT)
Age: 25
End: 2025-02-17

## 2025-02-17 NOTE — TELEPHONE ENCOUNTER
Lety called to schedule some appts with provider Isabelle Reeder. Pt states she needs after 4 pm. Her employment changed to 1st shift now. Writer informed pt that provider only has Thursdays that are late. She requested to schedule in those appts. Writer informed we can schedule two appts and then Therapist will schedule more from there. Writer informed the next available appt is Thurs 6/19/25. Pt requested the 5 pm time spot. Writer scheduled her and informed her that we will put her on a cancellation list in case those time spots would open up. Lety requested message be sent to Provider to call her. Writer informed a message would be put through to Provider for a return call request.

## 2025-02-19 ENCOUNTER — TELEPHONE (OUTPATIENT)
Dept: BEHAVIORAL/MENTAL HEALTH CLINIC | Facility: CLINIC | Age: 25
End: 2025-02-19

## 2025-02-20 ENCOUNTER — TELEPHONE (OUTPATIENT)
Dept: BEHAVIORAL/MENTAL HEALTH CLINIC | Facility: CLINIC | Age: 25
End: 2025-02-20

## 2025-02-25 DIAGNOSIS — Z30.41 ENCOUNTER FOR SURVEILLANCE OF CONTRACEPTIVE PILLS: ICD-10-CM

## 2025-02-26 RX ORDER — NORGESTIMATE AND ETHINYL ESTRADIOL 7DAYSX3 LO
1 KIT ORAL DAILY
Qty: 28 TABLET | Refills: 0 | Status: SHIPPED | OUTPATIENT
Start: 2025-02-26

## 2025-03-06 ENCOUNTER — TELEPHONE (OUTPATIENT)
Dept: BEHAVIORAL/MENTAL HEALTH CLINIC | Facility: CLINIC | Age: 25
End: 2025-03-06

## 2025-03-25 ENCOUNTER — TELEMEDICINE (OUTPATIENT)
Dept: PSYCHIATRY | Facility: CLINIC | Age: 25
End: 2025-03-25
Payer: COMMERCIAL

## 2025-03-25 DIAGNOSIS — F41.1 GENERALIZED ANXIETY DISORDER: ICD-10-CM

## 2025-03-25 DIAGNOSIS — F34.1 PRIMARY DYSTHYMIA EARLY ONSET: ICD-10-CM

## 2025-03-25 DIAGNOSIS — F32.81 PMDD (PREMENSTRUAL DYSPHORIC DISORDER): ICD-10-CM

## 2025-03-25 DIAGNOSIS — F32.2 CURRENT SEVERE EPISODE OF MAJOR DEPRESSIVE DISORDER WITHOUT PSYCHOTIC FEATURES WITHOUT PRIOR EPISODE (HCC): Primary | ICD-10-CM

## 2025-03-25 PROCEDURE — 99214 OFFICE O/P EST MOD 30 MIN: CPT | Performed by: NURSE PRACTITIONER

## 2025-03-25 NOTE — PSYCH
MEDICATION MANAGEMENT NOTE    Name: Lety Sanderson      : 2000      MRN: [id number]  Encounter Provider: KETAN Calhoun  Encounter Date: 3/25/2025   Encounter department: [facility]    Insurance: Payor: [employer] / Plan: [facility] / Product Type: [facility] /      Reason for Visit: Medication management follow-up visit  Assessment & Plan  PMDD (premenstrual dysphoric disorder)  Continue Prozac 60 mg daily       Primary dysthymia early onset  Continue individual therapy       Current severe episode of major depressive disorder without psychotic features without prior episode (HCC)  Continue Wellbutrin  mg daily  Continue Prozac 60 mg daily       Generalized anxiety disorder  Continue Ativan 0.5 mg as needed           Treatment Recommendations:    Educated about diagnosis and treatment modalities. Verbalizes understanding and agreement with the treatment plan.  Discussed self monitoring of symptoms, and symptom monitoring tools.  Discussed medications and if treatment adjustment was needed or desired.  Aware of 24 hour and weekend coverage for urgent situations accessed by calling [facility] main practice number  I am scheduling this patient out for greater than 3 months: No    Medications Risks/Benefits:      Risks, Benefits And Possible Side Effects Of Medications:    Risks, benefits, and possible side effects of medications explained to Lety and she (or legal representative) verbalizes understanding and agreement for treatment.    Controlled Medication Discussion:     Lety has been filling controlled prescriptions on time as prescribed according to [place] Prescription Drug Monitoring Program      History of Present Illness     Lety is seen today for a follow up for Major Depressive Disorder and Generalized Anxiety Disorder. She continues to improve gradually since the last visit. She states that  depressive symptoms has more controlled and continues to experience ongoing symptoms of anxiety. She  she is excited to tell me she has a new job at the [employer].  Working in microbiology departments. .  It is day shift work and seems to suit her better.  Is more in line with her education as well.  She is presenting brighter and smiling more.  States things are going well at home and with her boyfriend.  Acknowledges some gradual improvement in her depressive symptoms.  Has not needed as needed Ativan for her anxiety or symptoms of panic.    She denies any suicidal ideation, intent or plan at present; denies any homicidal ideation, intent or plan at present.    She denies any auditory hallucinations, denies any visual hallucinations, denies any delusional thinking.    She denies any side effects from current psychiatric medications.    HPI ROS Appetite Changes and Sleep:     She reports fluctuating sleep pattern, fluctuating appetite, fluctuating energy levels    Review Of Systems: A review of systems is obtained and is negative except for the pertinent positives listed in HPI/Subjective above.      Current Rating Scores:     Current PHQ-9   PHQ-2/9 Depression Screening    Little interest or pleasure in doing things: 1 - several days  Feeling down, depressed, or hopeless: 2 - more than half the days  Trouble falling or staying asleep, or sleeping too much: 1 - several days  Feeling tired or having little energy: 2 - more than half the days  Poor appetite or overeatin - not at all  Feeling bad about yourself - or that you are a failure or have let yourself or your family down: 1 - several days  Trouble concentrating on things, such as reading the newspaper or watching television: 0 - not at all  Moving or speaking so slowly that other people could have noticed. Or the opposite - being so fidgety or restless that you have been moving around a lot more than usual: 0 - not at all       Current MARIANELA-7   MARIANELA-7  Flowsheet Screening      Flowsheet Row Most Recent Value   Over the last two weeks, how often have you been bothered by the following problems?     Feeling nervous, anxious, or on edge 1    Not being able to stop or control worrying 2    Worrying too much about different things 2    Trouble relaxing  1    Being so restless that it's hard to sit still 0    Becoming easily annoyed or irritable  2    Feeling afraid as if something awful might happen 0    How difficult have these problems made it for you to do your work, take care of things at home, or get along with other people?  Somewhat difficult    MARIANELA Score  8             Areas of Improvement: reviewed in HPI/Subjective Section and reviewed in Assessment and Plan Section    Past Psychiatric History: (unchanged information from previous note copied and updated)    Traumatic History: (unchanged information from previous note copied and updated)    Past Medical History:   Diagnosis Date    Allergic     Asthma exacerbation, mild     last assessed - 06Oct2016    Chronic tonsillitis     Depression     Eczema     Fracture of phalanx of right index finger     Menorrhagia     last assessed - 05Nov2014    Mild asthma with acute exacerbation 02/08/2019    Obstructive sleep apnea of child     Orthostatic dizziness     last assessed - 20Nov2015    Premenarchal     Seasonal allergies     Resolved - [id number]    Tinea corporis 05/15/2020        Past Surgical History:   Procedure Laterality Date    TONSILLECTOMY AND ADENOIDECTOMY       Allergies:   Allergies   Allergen Reactions    Other      Other reaction(s): Asthma    Pollen Extract Allergic Rhinitis and Wheezing       Current Outpatient Medications   Medication Sig Dispense Refill    buPROPion (WELLBUTRIN XL) 150 mg 24 hr tablet Take 1 tablet (150 mg total) by mouth daily 90 tablet 1    cholecalciferol (VITAMIN D3) 25 mcg (1,000 units) tablet Take 1,000 Units by mouth daily      cyanocobalamin (VITAMIN B-12) 1000 MCG tablet  Take 1 tablet (1,000 mcg total) by mouth daily Do not start before October 31, 2023. 30 tablet 0    ferrous sulfate 324 (65 Fe) mg Take 1 tablet (324 mg total) by mouth every other day 45 tablet 0    FLUoxetine 60 MG TABS Take 1 tablet (60 mg total) by mouth daily 90 tablet 1    levalbuterol (XOPENEX HFA) 45 mcg/act inhaler Inhale 1-2 puffs every 4 (four) hours as needed for wheezing 15 g 1    LORazepam (Ativan) 0.5 mg tablet Take 1 tablet (0.5 mg total) by mouth daily as needed for anxiety 30 tablet 3    Norgestimate-Eth Estradiol (Tri-Lo-Parvin) 0.18/0.215/0.25 MG-25 MCG TABS Take 1 tablet by mouth daily 28 tablet 0    ondansetron (ZOFRAN) 4 mg tablet Take 1 tablet (4 mg total) by mouth every 8 (eight) hours as needed for nausea or vomiting 20 tablet 0     Current Facility-Administered Medications   Medication Dose Route Frequency Provider Last Rate Last Admin    levalbuterol (XOPENEX) inhalation solution 0.63 mg  0.63 mg Nebulization Q8H PRN Mehrdad Clement PA-C   0.63 mg at 02/08/19 1805       Substance Abuse History:    Social History     Substance and Sexual Activity   Alcohol Use Never     Social History     Substance and Sexual Activity   Drug Use Never       Social History:    Social History     Socioeconomic History    Marital status: Single     Spouse name: Not on file    Number of children: Not on file    Years of education: Not on file    Highest education level: Bachelor's degree (e.g., BA, AB, BS)   Occupational History    Not on file   Tobacco Use    Smoking status: Never    Smokeless tobacco: Never    Tobacco comments:     No tobacco/smoke exposure   Vaping Use    Vaping status: Never Used   Substance and Sexual Activity    Alcohol use: Never    Drug use: Never    Sexual activity: Yes     Partners: Male     Birth control/protection: Condom Male, OCP   Other Topics Concern    Not on file   Social History Narrative    Activities: Musical instrument    Adopted child    Adopted child    Born in [place]     Brushes teeth twice a day    Dental care, regularly    Flosses teeth regularly    Lives with mother (single parent)    Parents are     Pets/Animals: Cat    Pets/Animals: Dog    Sleeps 8 - 10 hours a day     Social Drivers of Health     Financial Resource Strain: Not on file   Food Insecurity: Not on file   Transportation Needs: Not on file   Physical Activity: Not on file   Stress: Not on file   Social Connections: Not on file   Intimate Partner Violence: Not on file   Housing Stability: Not on file       Family Psychiatric History:     Family History   Adopted: Yes   Family history unknown: Yes       Medical History Reviewed by provider this encounter:  Tobacco  Allergies  Meds  Problems  Med Hx  Surg Hx  Fam Hx          Objective   There were no vitals taken for this visit.     Mental Status Evaluation:    Appearance age appropriate, casually dressed   Behavior cooperative, calm   Speech normal rate, normal volume, normal pitch, spontaneous   Mood improved   Affect brighter   Thought Processes organized, goal directed   Thought Content no overt delusions   Perceptual Disturbances: no auditory hallucinations, no visual hallucinations   Abnormal Thoughts  Risk Potential Suicidal ideation - None  Homicidal ideation - None  Potential for aggression - No   Orientation oriented to person, place, time/date, and situation   Memory recent and remote memory grossly intact   Consciousness alert and awake   Attention Span Concentration Span attention span and concentration are age appropriate   Intellect appears to be of average intelligence   Insight intact and improving   Judgement intact and improving   Muscle Strength and  Gait normal muscle strength and normal muscle tone, normal gait and normal balance   Motor activity no abnormal movements   Language no difficulty naming common objects, no difficulty repeating a phrase, no difficulty writing a sentence   Fund of Knowledge adequate knowledge of current  events  adequate fund of knowledge regarding past history  adequate fund of knowledge regarding vocabulary    Pain none   Pain Scale 0       Laboratory Results: I have personally reviewed all pertinent laboratory/tests results    Last Visit Labs:   No visits with results within 1 Month(s) from this visit.   Latest known visit with results is:   Office Visit on 01/30/2025   Component Date Value    RAPID FLU A 01/30/2025 neg     RAPID FLU B 01/30/2025 neg     POCT SARS-CoV-2 Ag 01/30/2025 Negative     VALID CONTROL 01/30/2025 Valid      RAPID STREP A 01/30/2025 Negative        Suicide/Homicide Risk Assessment:    Risk of Harm to Self:  The following ratings are based on assessment at the time of the interview  Current Specific Risk Factors include: diagnosis of depression  Based on today's assessment, Lety presents the following risk of harm to self: minimal    Risk of Harm to Others:  The following ratings are based on assessment at the time of the interview  Recent Specific Risk Factors include: none  Based on today's assessment, Lety presents the following risk of harm to others: none    The following interventions are recommended: Continue medication management. No other intervention changes indicated at this time.    Psychotherapy Provided:     Individual psychotherapy provided: No    Treatment Plan:    Completed and signed during the session: Not applicable - Treatment Plan not due at this session    Goals: Progress towards Treatment Plan goals - Yes, progressing, as evidenced by subjective findings in HPI/Subjective Section and in Assessment and Plan Section    Depression Follow-up Plan Completed: Yes    Note Share:    This note was not shared with the patient due to this is a psychotherapy note    Administrative Statements   Administrative Statements   Encounter provider KETAN Calhoun    The Patient is located at Home and in the following state in which I hold an active license [place].    The patient was  identified by name and date of birth. Lety Sanderson was informed that this is a telemedicine visit and that the visit is being conducted through the Epic Embedded platform. She agrees to proceed..  My office door was closed. No one else was in the room.  She acknowledged consent and understanding of privacy and security of the video platform. The patient has agreed to participate and understands they can discontinue the visit at any time.    I have spent a total time of 30 minutes in caring for this patient on the day of the visit/encounter including Counseling / Coordination of care, not including the time spent for establishing the audio/video connection.        KETAN Calhoun 03/25/25

## 2025-04-03 ENCOUNTER — TELEPHONE (OUTPATIENT)
Dept: BEHAVIORAL/MENTAL HEALTH CLINIC | Facility: CLINIC | Age: 25
End: 2025-04-03

## 2025-04-07 ENCOUNTER — TELEPHONE (OUTPATIENT)
Age: 25
End: 2025-04-07

## 2025-04-07 NOTE — TELEPHONE ENCOUNTER
Patient contacted the office to schedule a follow up visit with provider. Patient is now scheduled for 6/19  at 5 in office.

## 2025-04-11 ENCOUNTER — TELEPHONE (OUTPATIENT)
Age: 25
End: 2025-04-11

## 2025-04-11 ENCOUNTER — TELEPHONE (OUTPATIENT)
Dept: OBGYN CLINIC | Facility: CLINIC | Age: 25
End: 2025-04-11

## 2025-05-15 DIAGNOSIS — Z30.41 ENCOUNTER FOR SURVEILLANCE OF CONTRACEPTIVE PILLS: ICD-10-CM

## 2025-05-15 NOTE — TELEPHONE ENCOUNTER
Patient called and asked for a refill and she is scheduled for an appointment in June and she only has 5 pills left . Thank you

## 2025-05-16 RX ORDER — NORGESTIMATE AND ETHINYL ESTRADIOL 7DAYSX3 LO
1 KIT ORAL DAILY
Qty: 28 TABLET | Refills: 0 | Status: SHIPPED | OUTPATIENT
Start: 2025-05-16

## 2025-05-20 ENCOUNTER — TELEMEDICINE (OUTPATIENT)
Dept: PSYCHIATRY | Facility: CLINIC | Age: 25
End: 2025-05-20
Payer: COMMERCIAL

## 2025-05-20 ENCOUNTER — TELEPHONE (OUTPATIENT)
Dept: PSYCHIATRY | Facility: CLINIC | Age: 25
End: 2025-05-20

## 2025-05-20 DIAGNOSIS — F34.1 PRIMARY DYSTHYMIA EARLY ONSET: ICD-10-CM

## 2025-05-20 DIAGNOSIS — F41.1 GENERALIZED ANXIETY DISORDER: ICD-10-CM

## 2025-05-20 DIAGNOSIS — F32.2 CURRENT SEVERE EPISODE OF MAJOR DEPRESSIVE DISORDER WITHOUT PSYCHOTIC FEATURES WITHOUT PRIOR EPISODE (HCC): Primary | ICD-10-CM

## 2025-05-20 PROCEDURE — 99214 OFFICE O/P EST MOD 30 MIN: CPT | Performed by: NURSE PRACTITIONER

## 2025-05-20 RX ORDER — FLUOXETINE HYDROCHLORIDE 60 MG/1
60 TABLET, FILM COATED ORAL; ORAL DAILY
Qty: 90 TABLET | Refills: 1 | Status: SHIPPED | OUTPATIENT
Start: 2025-05-20

## 2025-05-20 RX ORDER — BUPROPION HYDROCHLORIDE 150 MG/1
150 TABLET ORAL DAILY
Qty: 90 TABLET | Refills: 1 | Status: SHIPPED | OUTPATIENT
Start: 2025-05-20

## 2025-05-20 NOTE — TELEPHONE ENCOUNTER
Left voicemail advising patient to sign the Virtual Care Behavioral Health Consent form prior to virtual appointment to avoid cancellation / rescheduling.

## 2025-05-20 NOTE — BH TREATMENT PLAN
TREATMENT PLAN (Medication Management Only)        [facility]    Name and Date of Birth:  Lety Sanderson 25 y.o. 2000  MRN: [id number]  Date of Treatment Plan: May 20, 2025  Diagnosis/Diagnoses:    1. Current severe episode of major depressive disorder without psychotic features without prior episode (HCC)    2. Primary dysthymia early onset    3. Generalized anxiety disorder      Strengths/Personal Resources for Self-Care: supportive family, supportive friends, taking medications as prescribed, ability to adapt to life changes, ability to communicate well, ability to understand psychiatric illness, average or above intelligence.  Area/Areas of need (in own words): anxiety symptoms, depressive symptoms  1. Long Term Goal:   continue improvement in acceptable anxiety level, continue improvement in depression.  Target Date:6 months - 11/20/2025  Person/Persons responsible for completion of goal: Lety  2.  Short Term Objective (s) - How will we reach this goal?:   A.  Provider new recommended medication/dosage changes and/or continue medication(s): continue current medications as prescribed.  B.  Attend medication management appointments regularly. Take psychiatric medications responsibly. Attend psychotherapy regularly..  C.  N/A.  Target Date:6 months - 11/20/2025  Person/Persons Responsible for Completion of Goal: Lety  Progress Towards Goals: improving gradually  Treatment Modality: medication management every 3 months  Review due 180 days from date of this plan: 6 months - 11/20/2025  Expected length of service: ongoing treatment unless revised  My Physician/PA/NP and I have developed this plan together and I agree to work on the goals and objectives. I understand the treatment goals that were developed for my treatment.   Electronic Signatures: on file (unless signed below)    KETAN Calhoun 05/20/25

## 2025-05-20 NOTE — PSYCH
MEDICATION MANAGEMENT NOTE    Name: Lety Sanderson      : 2000      MRN: [id number]  Encounter Provider: KETAN Calhoun  Encounter Date: 2025   Encounter department: [facility]    Insurance: Payor: CAPITAL / Plan: [facility] NETWORK / Product Type: [facility] /      Reason for Visit: No chief complaint on file.  :  Assessment & Plan  Current severe episode of major depressive disorder without psychotic features without prior episode (HCC)    Orders:    FLUoxetine 60 MG TABS; Take 1 tablet (60 mg total) by mouth daily    buPROPion (WELLBUTRIN XL) 150 mg 24 hr tablet; Take 1 tablet (150 mg total) by mouth daily    Primary dysthymia early onset    Orders:    FLUoxetine 60 MG TABS; Take 1 tablet (60 mg total) by mouth daily    Generalized anxiety disorder    Orders:    FLUoxetine 60 MG TABS; Take 1 tablet (60 mg total) by mouth daily        Treatment Recommendations:    Educated about diagnosis and treatment modalities. Verbalizes understanding and agreement with the treatment plan.  Discussed self monitoring of symptoms, and symptom monitoring tools.  Discussed medications and if treatment adjustment was needed or desired.  Medication management every 3 months  Aware of 24 hour and weekend coverage for urgent situations accessed by calling [facility] main practice number  Continue psychotherapy with own therapist  I am scheduling this patient out for greater than 3 months: No    Medications Risks/Benefits:      Risks, Benefits And Possible Side Effects Of Medications:    Risks, benefits, and possible side effects of medications explained to Lety and she (or legal representative) verbalizes understanding and agreement for treatment.    Controlled Medication Discussion:     Lety has been filling controlled prescriptions on time as prescribed according to [place] Prescription Drug Monitoring Program.      History of  Present Illness     CC: Lety presents today for follow up on her major depression, PMDD, anxiety     Lety has started her new job.  She does find it different and a little bit more interesting than her last, but still mundane and redundant.  However, she has started classes for masters program in [employer].  She does continue to have a dip in her mood in the 2 weeks between ovulation and the start of her menses.  This is when she is more inclined to be depressed or anxious or emotional.  However, she has not recently had thoughts of giving up on life or passive death wish.  She is trying to work out her schedule so she can get back in to see her therapist, Isabelle.  She continues in a serious relationship and hoping they can move in together.  States there have been conflicts with mom and it is hard living at home.  She otherwise feels medications have been helpful and wishes to continue the same.    Med Compliance: yes    Since our last visit, overall symptoms have been stable.  Continue all medications the same as noted above    HPI ROS:     Medication Side Effects: Denies  Depression: 6/10 (10 worst)  Anxiety: 6/10 (10 worst)  Safety concerns (SI, HI, others): Denies  Sleep: Adequate  Energy: Adequate  Appetite: No change  Weight Change: No change    Lety denies any side effects from medications unless noted above.    Review Of Systems: A review of systems is obtained and is negative except for the pertinent positives listed in HPI/Subjective above.      Current Rating Scores:     Current PHQ-9   PHQ-2/9 Depression Screening    Little interest or pleasure in doing things: 1 - several days  Feeling down, depressed, or hopeless: 2 - more than half the days  Trouble falling or staying asleep, or sleeping too much: 1 - several days  Feeling tired or having little energy: 1 - several days  Poor appetite or overeatin - not at all  Feeling bad about yourself - or that you are a failure or have let yourself  or your family down: 1 - several days  Trouble concentrating on things, such as reading the newspaper or watching television: 0 - not at all  Moving or speaking so slowly that other people could have noticed. Or the opposite - being so fidgety or restless that you have been moving around a lot more than usual: 0 - not at all  Thoughts that you would be better off dead, or of hurting yourself in some way: 0 - not at all  PHQ-9 Score: 6  PHQ-9 Interpretation: Mild depression       Current MARIANELA-7   MARIANEAL-7 Flowsheet Screening      Flowsheet Row Most Recent Value   Over the last two weeks, how often have you been bothered by the following problems?     Feeling nervous, anxious, or on edge 1    Not being able to stop or control worrying 1    Worrying too much about different things 1    Trouble relaxing  0    Being so restless that it's hard to sit still 0    Becoming easily annoyed or irritable  1    Feeling afraid as if something awful might happen 0    How difficult have these problems made it for you to do your work, take care of things at home, or get along with other people?  Somewhat difficult    MARIANELA Score  4             Areas of Improvement: reviewed in HPI/Subjective Section and reviewed in Assessment and Plan Section      Past Medical History:   Diagnosis Date    Allergic     Asthma exacerbation, mild     last assessed - 06Oct2016    Chronic tonsillitis     Depression     Eczema     Fracture of phalanx of right index finger     Menorrhagia     last assessed - 05Nov2014    Mild asthma with acute exacerbation 02/08/2019    Obstructive sleep apnea of child     Orthostatic dizziness     last assessed - 20Nov2015    Premenarchal     Seasonal allergies     Resolved - [id number]    Tinea corporis 05/15/2020     Past Surgical History:   Procedure Laterality Date    TONSILLECTOMY AND ADENOIDECTOMY       Allergies: Allergies[1]    Current Outpatient Medications   Medication Instructions    buPROPion (WELLBUTRIN XL) 150 mg,  Oral, Daily    cholecalciferol (VITAMIN D3) 1,000 Units, Daily    cyanocobalamin (VITAMIN B-12) 1,000 mcg, Oral, Daily    ferrous sulfate 324 mg, Oral, Every other day    FLUoxetine 60 mg, Oral, Daily    levalbuterol (XOPENEX HFA) 45 mcg/act inhaler 1-2 puffs, Inhalation, Every 4 hours PRN    LORazepam (ATIVAN) 0.5 mg, Oral, Daily PRN    Norgestimate-Eth Estradiol (Tri-Lo-Parvin) 0.18/0.215/0.25 MG-25 MCG TABS 1 tablet, Oral, Daily    ondansetron (ZOFRAN) 4 mg, Oral, Every 8 hours PRN        Substance Abuse History:    Tobacco, Alcohol and Drug Use History     Tobacco Use    Smoking status: Never    Smokeless tobacco: Never    Tobacco comments:     No tobacco/smoke exposure   Vaping Use    Vaping status: Never Used   Substance Use Topics    Alcohol use: Never    Drug use: Never     Alcohol Use: Not At Risk (3/24/2021)    AUDIT-C     Frequency of Alcohol Consumption: Never       Social History:    Social History     Socioeconomic History    Marital status: Single     Spouse name: Not on file    Number of children: Not on file    Years of education: Not on file    Highest education level: Bachelor's degree (e.g., BA, AB, BS)   Occupational History    Not on file   Other Topics Concern    Not on file   Social History Narrative    Activities: Musical instrument    Adopted child    Adopted child    Born in [place]    Brushes teeth twice a day    Dental care, regularly    Flosses teeth regularly    Lives with mother (single parent)    Parents are     Pets/Animals: Cat    Pets/Animals: Dog    Sleeps 8 - 10 hours a day        Family Psychiatric History:     Family History   Adopted: Yes   Family history unknown: Yes       Medical History Reviewed by provider this encounter:  Tobacco  Allergies  Meds  Problems  Med Hx  Surg Hx  Fam Hx          Objective   There were no vitals taken for this visit.     Mental Status Evaluation:    Appearance age appropriate, casually dressed   Behavior cooperative, calm   Speech  normal rate, normal volume, normal pitch, spontaneous   Mood dysphoric   Affect flat   Thought Processes organized, goal directed   Thought Content no overt delusions   Perceptual Disturbances: no auditory hallucinations, no visual hallucinations   Abnormal Thoughts  Risk Potential Suicidal ideation - None  Homicidal ideation - None  Potential for aggression - No   Orientation oriented to person, place, time/date, and situation   Memory recent and remote memory grossly intact   Consciousness alert and awake   Attention Span Concentration Span decreased attention span  decreased concentration   Intellect appears to be of average intelligence   Insight intact   Judgement intact   Muscle Strength and  Gait normal muscle strength and normal muscle tone, normal gait and normal balance   Motor activity no abnormal movements   Language no difficulty naming common objects, no difficulty repeating a phrase, no difficulty writing a sentence   Fund of Knowledge adequate knowledge of current events  adequate fund of knowledge regarding past history  adequate fund of knowledge regarding vocabulary        Laboratory Results: I have personally reviewed all pertinent laboratory/tests results        Suicide/Homicide Risk Assessment:    Risk of Harm to Self:  The following ratings are based on assessment at the time of the interview  Recent Specific Risk Factors include: current depressive symptoms  Protective Factors: no current suicidal ideation, ability to adapt to change, able to make plans for the future, access to mental health treatment, compliant with medications, compliant with mental health treatment, connection to community  Based on today's assessment, Lety presents the following risk of harm to self: minimal    Risk of Harm to Others:  The following ratings are based on assessment at the time of the interview  Recent Specific Risk Factors include: none  Based on today's assessment, Lety presents the following risk of  "harm to others: none    The following interventions are recommended: Continue medication management. No other intervention changes indicated at this time.    Psychotherapy Provided:     Individual psychotherapy provided: No    Treatment Plan:    Completed and signed during the session: Yes - Treatment Plan done but not signed at time of office visit due to: Plan reviewed by video and verbal consent given due to virtual visit. Treatment Plan sent to patient via [employer] for signature.    Goals: Progress towards Treatment Plan goals - Yes, progressing, as evidenced by subjective findings in HPI/Subjective Section and in Assessment and Plan Section    Depression Follow-up Plan Completed: Yes    Note Share:    This note was not shared with the patient due to this is a psychotherapy note    Administrative Statements   Administrative Statements   Encounter provider KETAN Calhoun    The Patient is located at Home and in the following state in which I hold an active license [place].    The patient was identified by name and date of birth. Lety Sanderson was informed that this is a telemedicine visit and that the visit is being conducted through the Epic Embedded platform. She agrees to proceed..  My office door was closed. No one else was in the room.  She acknowledged consent and understanding of privacy and security of the video platform. The patient has agreed to participate and understands they can discontinue the visit at any time.    I have spent a total time of 30 minutes in caring for this patient on the day of the visit/encounter including Counseling / Coordination of care, not including the time spent for establishing the audio/video connection.      Portions of the record may have been created with voice recognition software. Occasional wrong word or \"sound a like\" substitutions may have occurred due to the inherent limitations of voice recognition software. Read the chart carefully and recognize, using context, " where substitutions have occurred.    KETAN Calhoun 05/20/25         [1]   Allergies  Allergen Reactions    Other      Other reaction(s): Asthma    Pollen Extract Allergic Rhinitis and Wheezing

## 2025-06-12 ENCOUNTER — ANNUAL EXAM (OUTPATIENT)
Dept: OBGYN CLINIC | Facility: CLINIC | Age: 25
End: 2025-06-12
Payer: COMMERCIAL

## 2025-06-12 VITALS
HEIGHT: 62 IN | DIASTOLIC BLOOD PRESSURE: 72 MMHG | SYSTOLIC BLOOD PRESSURE: 104 MMHG | WEIGHT: 133 LBS | BODY MASS INDEX: 24.48 KG/M2

## 2025-06-12 DIAGNOSIS — Z30.41 ENCOUNTER FOR SURVEILLANCE OF CONTRACEPTIVE PILLS: ICD-10-CM

## 2025-06-12 DIAGNOSIS — Z01.419 ENCOUNTER FOR GYNECOLOGICAL EXAMINATION WITHOUT ABNORMAL FINDING: Primary | ICD-10-CM

## 2025-06-12 PROCEDURE — S0612 ANNUAL GYNECOLOGICAL EXAMINA: HCPCS | Performed by: PHYSICIAN ASSISTANT

## 2025-06-12 PROCEDURE — G0145 SCR C/V CYTO,THINLAYER,RESCR: HCPCS | Performed by: PHYSICIAN ASSISTANT

## 2025-06-12 RX ORDER — NORGESTIMATE AND ETHINYL ESTRADIOL 7DAYSX3 LO
1 KIT ORAL DAILY
Qty: 90 TABLET | Refills: 4 | Status: SHIPPED | OUTPATIENT
Start: 2025-06-12

## 2025-06-12 NOTE — PROGRESS NOTES
Name: Lety Sanderson      : 2000      MRN: [id number]  Encounter Provider: Junior Rahman PA-C  Encounter Date: 2025   Encounter department: [facility] [place]  :  Assessment & Plan  Encounter for gynecological examination without abnormal finding  - Routine well woman exam done today.  - Cervical Cancer Screening: Pap done..  We discussed that due to menses pap may be unsatisfactor and will need to repeat pap.  Current ASCCP Guidelines reviewed.    - Gardasil: Reviewed recommended for patients from 9-45 years of age.  Patient has had the vaccine.  - Contraception: Combined Oral Contraceptive Pills  - STD testing: Patient declines STD testing today.  - Return to office in 1 year for annual.    All questions answered to the best of my ability.    Orders:    Liquid-based pap, screening    Encounter for surveillance of contraceptive pills    Orders:    Norgestimate-Eth Estradiol (Tri-Lo-Parvin) 0.18/0.215/0.25 MG-25 MCG TABS; Take 1 tablet by mouth daily        History of Present Illness   HPI  25 y.o. Lety Sanderson  presents for annual exam.    Pt has PMDD for which she takes COCPs.    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    Pt has no complaints.    Patient's last menstrual period was 2025 (exact date).    Gynecology  Prior to birth control, she had monthly periods that were heavy. She has been on birth control pills since 2019.    - Sexually Active: Yes .    - Birth Control: Combined Oral Contraceptive Pills    - STD screening desired: declines     - pap  Lab Results   Component Value Date    PRIMINTERP Negative for intraepithelial lesion or malignancy 2022    SPECADGYN  2022     Satisfactory for evaluation. Endocervical/transformation zone component present.       - Hx of abnormal paps: No    - Hx of gynecology surgeries: No    - Gardasil Vaccine: yes    Family  "history of any of the following cancers:  Patient is adopted. Family hx unknown.    Obstetric History   OB History    Para Term  AB Living   0 0 0 0 0 0   SAB IAB Ectopic Multiple Live Births   0 0 0 0 0         History obtained from: patient    Review of Systems   Constitutional:  Negative for activity change.   Gastrointestinal:  Negative for constipation, diarrhea, nausea and vomiting.   Genitourinary:  Negative for difficulty urinating, dyspareunia, dysuria, frequency, hematuria, pelvic pain, urgency, vaginal bleeding, vaginal discharge and vaginal pain.          Objective   /72 (BP Location: Left arm, Patient Position: Sitting, Cuff Size: Standard)   Ht 5' 2\" (1.575 m)   Wt 60.3 kg (133 lb)   LMP 2025 (Exact Date)   BMI 24.33 kg/m²      Physical Exam  Vitals reviewed.   Constitutional:       General: She is not in acute distress.     Appearance: Normal appearance. She is not ill-appearing or toxic-appearing.   HENT:      Head: Normocephalic and atraumatic.      Jaw: There is normal jaw occlusion.      Nose: Nose normal.      Mouth/Throat:      Lips: Pink.     Eyes:      General: Lids are normal.      Extraocular Movements: Extraocular movements intact.     Neck:      Thyroid: No thyroid mass, thyromegaly or thyroid tenderness.      Trachea: Trachea normal. No tracheostomy or tracheal deviation.   Chest:   Breasts:     Breasts are symmetrical.      Right: Normal. No swelling, bleeding, inverted nipple, mass, nipple discharge, skin change or tenderness.      Left: Normal. No swelling, bleeding, inverted nipple, mass, nipple discharge, skin change or tenderness.   Abdominal:      General: There is no distension.      Palpations: Abdomen is soft. There is no fluid wave, hepatomegaly or splenomegaly.      Tenderness: There is no abdominal tenderness. There is no guarding or rebound.   Genitourinary:     Exam position: Lithotomy position.      Pubic Area: No rash.       Labia:         " Right: No rash, tenderness, lesion or injury.         Left: No rash, tenderness, lesion or injury.       Urethra: No prolapse or urethral lesion.      Vagina: Bleeding present.      Cervix: Cervical bleeding (cleaned cervix with scopette in attempts to remove blood prior to pap) present. No cervical motion tenderness or lesion.      Uterus: Normal. Not tender.       Adnexa: Right adnexa normal and left adnexa normal.        Right: No mass, tenderness or fullness.          Left: No mass, tenderness or fullness.        Comments: Approximately Fundal Size: 6 cm    Musculoskeletal:      Cervical back: Neck supple. No edema or erythema. Normal range of motion.   Lymphadenopathy:      Cervical: No cervical adenopathy.      Right cervical: No superficial or deep cervical adenopathy.     Left cervical: No superficial or deep cervical adenopathy.      Upper Body:      Right upper body: No supraclavicular or axillary adenopathy.      Left upper body: No supraclavicular or axillary adenopathy.     Skin:     General: Skin is cool and dry.     Neurological:      Mental Status: She is alert.     Psychiatric:         Behavior: Behavior is cooperative.

## 2025-06-18 LAB
LAB AP GYN PRIMARY INTERPRETATION: NORMAL
Lab: NORMAL

## 2025-07-10 ENCOUNTER — TELEPHONE (OUTPATIENT)
Dept: PSYCHIATRY | Facility: CLINIC | Age: 25
End: 2025-07-10

## 2025-07-10 ENCOUNTER — SOCIAL WORK (OUTPATIENT)
Dept: BEHAVIORAL/MENTAL HEALTH CLINIC | Facility: CLINIC | Age: 25
End: 2025-07-10
Payer: COMMERCIAL

## 2025-07-10 DIAGNOSIS — F41.1 GENERALIZED ANXIETY DISORDER: Primary | ICD-10-CM

## 2025-07-10 DIAGNOSIS — F32.2 CURRENT SEVERE EPISODE OF MAJOR DEPRESSIVE DISORDER WITHOUT PSYCHOTIC FEATURES WITHOUT PRIOR EPISODE (HCC): ICD-10-CM

## 2025-07-10 DIAGNOSIS — F34.1 PRIMARY DYSTHYMIA EARLY ONSET: ICD-10-CM

## 2025-07-10 PROCEDURE — 90837 PSYTX W PT 60 MINUTES: CPT

## 2025-07-10 NOTE — PSYCH
Behavioral Health Psychotherapy Progress Note    Psychotherapy Provided: Individual Psychotherapy     1. Generalized anxiety disorder        2. 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 Primary dysthymia early onset            Goals addressed in session: Goal 1 and Goal 2     DATA:     Lety and therapist worked on and completed the treatment plan. Treatment plan was sent via [employer] to sign. Lety and therapist spoke about her current progress/stressors. She explained that since her last therapy session, she had started a new position at her job. She has been working day shift and likes the transition. She explained that she likes to be home in the afternoon and gets to spend time with her boyfriend more. 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 She explained that she will be done with the program by spring semester of 2026. Lety explained that she isn't quite sure what she may do with this degree, but is interested in research. Lety and therapist discussed a topic she requested to discuss. She spoke about how she has been having interpersonal issues with a couple of her friends. She spoke about how they seem to have drifted apart and feels like they don't have much in common anymore. She reports being upset about seeing the two friends spend time together and having a fear of missing out. Lety explained that she wants to discuss her feelings about this and how she wants to fix their relationship. Lety and therapist spoke about utilizing effective communication skills and boundaries in regards to this. She agreed. She stated that she is afraid to lose them as she has been friends with them for 15 an 20 years. She explained that she wants closure in some way due to feeling like it's not over yet. Therapist agreed and spoke about the importance of speaking to them and taking it one step  "at a time. She agreed and discussed her worried of it not working out or causing a disagreement. Therapist discussed that if this happens, it may show where her friends are or what is important to them. She seemed to understand and will take the time to figure out what she is going to do. Therapist will check status in next session. Lety and therapist will continue to work on the above mentioned for next session.    During this session, this clinician used the following therapeutic modalities: Engagement Strategies, Client-centered Therapy, Cognitive Behavioral Therapy, Solution-Focused Therapy, and Supportive Psychotherapy    Substance Abuse was not addressed during this session. If the client is diagnosed with a co-occurring substance use disorder, please indicate any changes in the frequency or amount of use: N/A. Stage of change for addressing substance use diagnoses: No substance use/Not applicable    ASSESSMENT:  Lety Sanderson presents with a Euthymic/ normal mood.     her affect is Normal range and intensity, which is congruent, with her mood and the content of the session. The client has made progress on their goals.     Lety Sanderson presents with a none risk of suicide, none risk of self-harm, and none risk of harm to others.    For any risk assessment that surpasses a \"low\" rating, a safety plan must be developed.    A safety plan was indicated: no  If yes, describe in detail     PLAN: Between sessions, Lety Sanderson will continue to work on the above mentioned for next session. At the next session, the therapist will use Engagement Strategies, Client-centered Therapy, Cognitive Behavioral Therapy, Solution-Focused Therapy, and Supportive Psychotherapy to address her stressors.    Behavioral Health Treatment Plan and Discharge Planning: Lety Sanderson is aware of and agrees to continue to work on their treatment plan. They have identified and are working toward their discharge goals. " yes    Depression Follow-up Plan Completed: Yes    Visit start and stop times:    07/10/25  Start Time: 1800  Stop Time: 1905  Total Visit Time: 65 minutes

## 2025-07-10 NOTE — BH TREATMENT PLAN
"Outpatient Behavioral Health Psychotherapy Treatment Plan    Lety Sanderson  2000     Date of Initial Psychotherapy Assessment: 09/05/2024   Date of Current Treatment Plan: 07/10/25  Treatment Plan Target Date: 01/10/2026  Treatment Plan Expiration Date: 01/10/2026    Diagnosis:   1. Generalized anxiety disorder        2. 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 Primary dysthymia early onset            Area(s) of Need: New job/promotion, schooling    Long Term Goal 1 (in the client's own words): \" I would like to be in a higher position within my career field and make more money to be financially stable.\"    Stage of Change: Preparation    Target Date for completion: 01/10/2026     Anticipated therapeutic modalities: Engagement Strategies, Client-centered Therapy, Cognitive Behavioral Therapy, Solution-Focused Therapy, and Supportive Psychotherapy     People identified to complete this goal: Lety and this therapist, Isabelle.       Objective 1: (identify the means of measuring success in meeting the objective): AJeff Davidson will utilize online job boards to search for jobs she would like to apply to. B. Lety and therapist will work on interviewing skills/tips when in biweekly sessions. C. Lety and therapist will work on self-confidence as well as being able to \"sell herself\" in regards to job interviewing in order to obtain a higher position in her career field. KAMRANJeff Davidson will be able to apply her finished masters degree (as of spring 2026) to her resume in order to find a higher position with higher pay.        Long Term Goal 2 (in the client's own words): \"I want to finish my masters degree in spring of 2026\"     Stage of Change: Preparation    Target Date for completion: 01/10/2026     Anticipated therapeutic modalities: Engagement Strategies, Client-centered Therapy, Cognitive Behavioral Therapy, Solution-Focused Therapy, and " Supportive Psychotherapy     People identified to complete this goal: Lety and this therapist, Isabelle.       Objective 1: (identify the means of measuring success in meeting the objective): A. Lety will continue to attend therapy sessions when scheduled. B. Lety will attend her 3 scheduled courses and continue to work through them until her last semester is complete in spring semester of 2026. CJeff Davidson will obtain her masters degree in spring 2026.     I am currently under the care of a [facility] psychiatric provider: yes    My [facility] psychiatric provider is: KETAN Calhoun    I am currently taking psychiatric medications: Yes, as prescribed    I feel that I will be ready for discharge from mental health care when I reach the following (measurable goal/objective): When Lety is able to apply learned skills/techniques without prompting or support.     For children and adults who have a legal guardian:   Has there been any change to custody orders and/or guardianship status? NA. If yes, attach updated documentation.    I have created my Crisis Plan and have been offered a copy of this plan    Behavioral Health Treatment Plan [facility]: Diagnosis and Treatment Plan explained to Lety Sanderson acknowledges an understanding of their diagnosis. Lety Sanderson agrees to this treatment plan.    I have been offered a copy of this Treatment Plan. yes

## 2025-07-10 NOTE — TELEPHONE ENCOUNTER
Patient returning call for possible scheduling with clinician today at 6pm. Writer warm transferred patient for further assistance.

## 2025-07-31 ENCOUNTER — TELEPHONE (OUTPATIENT)
Age: 25
End: 2025-07-31

## 2025-08-05 ENCOUNTER — TELEPHONE (OUTPATIENT)
Dept: BEHAVIORAL/MENTAL HEALTH CLINIC | Facility: CLINIC | Age: 25
End: 2025-08-05

## 2025-08-07 ENCOUNTER — SOCIAL WORK (OUTPATIENT)
Dept: BEHAVIORAL/MENTAL HEALTH CLINIC | Facility: CLINIC | Age: 25
End: 2025-08-07
Payer: COMMERCIAL

## 2025-08-07 DIAGNOSIS — F32.2 CURRENT SEVERE EPISODE OF MAJOR DEPRESSIVE DISORDER WITHOUT PSYCHOTIC FEATURES WITHOUT PRIOR EPISODE (HCC): ICD-10-CM

## 2025-08-07 DIAGNOSIS — F34.1 PRIMARY DYSTHYMIA EARLY ONSET: ICD-10-CM

## 2025-08-07 DIAGNOSIS — F41.1 GENERALIZED ANXIETY DISORDER: Primary | ICD-10-CM

## 2025-08-07 PROCEDURE — 90837 PSYTX W PT 60 MINUTES: CPT

## 2025-08-12 ENCOUNTER — TELEMEDICINE (OUTPATIENT)
Dept: PSYCHIATRY | Facility: CLINIC | Age: 25
End: 2025-08-12
Payer: COMMERCIAL